# Patient Record
Sex: FEMALE | Race: WHITE | NOT HISPANIC OR LATINO | Employment: UNEMPLOYED | URBAN - NONMETROPOLITAN AREA
[De-identification: names, ages, dates, MRNs, and addresses within clinical notes are randomized per-mention and may not be internally consistent; named-entity substitution may affect disease eponyms.]

---

## 2017-07-24 ENCOUNTER — HOSPITAL ENCOUNTER (EMERGENCY)
Facility: HOSPITAL | Age: 22
Discharge: HOME | End: 2017-07-24
Attending: STUDENT IN AN ORGANIZED HEALTH CARE EDUCATION/TRAINING PROGRAM | Admitting: STUDENT IN AN ORGANIZED HEALTH CARE EDUCATION/TRAINING PROGRAM

## 2017-07-24 ENCOUNTER — APPOINTMENT (OUTPATIENT)
Dept: GENERAL RADIOLOGY | Facility: HOSPITAL | Age: 22
End: 2017-07-24

## 2017-07-24 VITALS
HEIGHT: 67 IN | BODY MASS INDEX: 39.24 KG/M2 | TEMPERATURE: 98.3 F | DIASTOLIC BLOOD PRESSURE: 67 MMHG | RESPIRATION RATE: 18 BRPM | WEIGHT: 250 LBS | SYSTOLIC BLOOD PRESSURE: 108 MMHG | OXYGEN SATURATION: 99 % | HEART RATE: 66 BPM

## 2017-07-24 DIAGNOSIS — S90.32XA CONTUSION OF LEFT FOOT, INITIAL ENCOUNTER: Primary | ICD-10-CM

## 2017-07-24 PROCEDURE — 99283 EMERGENCY DEPT VISIT LOW MDM: CPT

## 2017-07-24 PROCEDURE — 73630 X-RAY EXAM OF FOOT: CPT

## 2017-07-24 RX ORDER — MELOXICAM 15 MG/1
15 TABLET ORAL DAILY
Qty: 20 TABLET | Refills: 0 | Status: SHIPPED | OUTPATIENT
Start: 2017-07-24 | End: 2018-03-09

## 2017-07-25 NOTE — ED PROVIDER NOTES
Subjective   HPI Comments: 22-year-old female who works at [employer] just prior to arrival had a pallet of dog food land on her left foot.  He states she's had severe pain since that time that is constant and worse with ambulation.      Review of Systems   All other systems reviewed and are negative.      History reviewed. No pertinent past medical history.    No Known Allergies    Past Surgical History:   Procedure Laterality Date   • ANKLE SURGERY Left        History reviewed. No pertinent family history.    Social History     Social History   • Marital status: Single     Spouse name: N/A   • Number of children: N/A   • Years of education: N/A     Social History Main Topics   • Smoking status: Never Smoker   • Smokeless tobacco: Current User     Types: Chew   • Alcohol use No   • Drug use: No   • Sexual activity: Not Asked     Other Topics Concern   • None     Social History Narrative   • None           Objective   Physical Exam   Nursing note and vitals reviewed.    GEN: No acute distress  Head: Normocephalic, atraumatic  Eyes: Pupils equal round reactive to light  ENT: Posterior pharynx normal in appearance, oral mucosa is moist  Chest: Nontender to palpation  Cardiovascular: Regular rate  Lungs: Clear to auscultation bilaterally  Abdomen: Soft, nontender, nondistended, no peritoneal signs  Extremities: Dorsum of the distal to mid left foot is tender to palpation.  It is normal in appearance.  Strong DP pulse.  No ecchymosis swelling or deformity.    Neuro: GCS 15  Psych: Mood and affect are appropriate    Procedures         ED Course  ED Course                  MDM  Number of Diagnoses or Management Options  Contusion of left foot, initial encounter:      Amount and/or Complexity of Data Reviewed  Independent visualization of images, tracings, or specimens: yes        Final diagnoses:   Contusion of left foot, initial encounter            Theodore Brandt MD  07/24/17 4821

## 2018-07-04 ENCOUNTER — HOSPITAL ENCOUNTER (EMERGENCY)
Facility: HOSPITAL | Age: 23
Discharge: HOME | End: 2018-07-04
Attending: EMERGENCY MEDICINE | Admitting: NURSE PRACTITIONER

## 2018-07-04 VITALS
HEIGHT: 67 IN | TEMPERATURE: 97.9 F | DIASTOLIC BLOOD PRESSURE: 85 MMHG | BODY MASS INDEX: 44.36 KG/M2 | WEIGHT: 282.6 LBS | RESPIRATION RATE: 18 BRPM | OXYGEN SATURATION: 98 % | HEART RATE: 88 BPM | SYSTOLIC BLOOD PRESSURE: 142 MMHG

## 2018-07-04 DIAGNOSIS — J01.90 SUBACUTE SINUSITIS, UNSPECIFIED LOCATION: Primary | ICD-10-CM

## 2018-07-04 DIAGNOSIS — R11.2 NON-INTRACTABLE VOMITING WITH NAUSEA, UNSPECIFIED VOMITING TYPE: ICD-10-CM

## 2018-07-04 PROCEDURE — 99283 EMERGENCY DEPT VISIT LOW MDM: CPT

## 2018-07-04 RX ORDER — CEPHALEXIN 500 MG/1
500 CAPSULE ORAL 3 TIMES DAILY
Qty: 30 CAPSULE | Refills: 0 | OUTPATIENT
Start: 2018-07-04 | End: 2019-07-24

## 2018-07-04 RX ORDER — ONDANSETRON 4 MG/1
4 TABLET, ORALLY DISINTEGRATING ORAL ONCE
Status: COMPLETED | OUTPATIENT
Start: 2018-07-04 | End: 2018-07-04

## 2018-07-04 RX ORDER — ONDANSETRON 4 MG/1
4 TABLET, ORALLY DISINTEGRATING ORAL EVERY 6 HOURS PRN
Qty: 20 TABLET | Refills: 0 | Status: SHIPPED | OUTPATIENT
Start: 2018-07-04 | End: 2019-08-04

## 2018-07-04 RX ORDER — CEPHALEXIN 250 MG/1
500 CAPSULE ORAL ONCE
Status: COMPLETED | OUTPATIENT
Start: 2018-07-04 | End: 2018-07-04

## 2018-07-04 RX ADMIN — CEPHALEXIN 500 MG: 250 CAPSULE ORAL at 13:20

## 2018-07-04 RX ADMIN — ONDANSETRON 4 MG: 4 TABLET, ORALLY DISINTEGRATING ORAL at 13:20

## 2018-07-04 NOTE — ED PROVIDER NOTES
Subjective   History of Present Illness  23-year-old female presents with complaints of sinus congestion, sore throat, postnasal drainage, cough, nausea and vomiting mainly in the morning and evening.  He doesn't know if she's had a fever.  She's been having symptoms for approximately 3 weeks and was treated with amoxicillin and took 2 doses but it caused her to have diarrhea so she stopped it.  Review of Systems   All other systems reviewed and are negative.      Past Medical History:   Diagnosis Date   • Asthma        No Known Allergies    Past Surgical History:   Procedure Laterality Date   • ANKLE SURGERY Left        History reviewed. No pertinent family history.    Social History     Social History   • Marital status: Single     Social History Main Topics   • Smoking status: Never Smoker   • Smokeless tobacco: Current User     Types: Chew   • Alcohol use No   • Drug use: No     Other Topics Concern   • Not on file           Objective   Physical Exam   Constitutional: She is oriented to person, place, and time. She appears well-developed and well-nourished.   HENT:   Head: Normocephalic and atraumatic.   TMs are pearly gray bilaterally.  Nares are red with yellow drainage bilaterally.  She has maxillary and frontal sinus tenderness to palpation bilaterally.  Posterior pharynx is slightly red with yellow postnasal drainage.   Eyes: Conjunctivae and EOM are normal. Pupils are equal, round, and reactive to light.   Neck: Normal range of motion. Neck supple.   Cardiovascular: Normal rate, regular rhythm, normal heart sounds and intact distal pulses.  Exam reveals no gallop and no friction rub.    No murmur heard.  Pulmonary/Chest: Effort normal and breath sounds normal. No respiratory distress. She has no wheezes. She has no rales. She exhibits no tenderness.   Musculoskeletal: Normal range of motion.   Lymphadenopathy:     She has no cervical adenopathy.   Neurological: She is alert and oriented to person, place,  and time.   Skin: Skin is warm and dry. Capillary refill takes less than 2 seconds.   Psychiatric: She has a normal mood and affect. Her behavior is normal. Judgment and thought content normal.   Nursing note and vitals reviewed.      Procedures           ED Course        Gave the patient a dose of Keflex and Zofran here in the emergency department.  She was able to keep that down.  I told her she had complete the course of antibiotics to get rid of the sinusitis.  I also ordered Zofran for nausea.  I told her she can get some yogurt or probiotics to help with diarrhea.  I gave her a note for work today.  I recommended she drink plenty of fluids.          MDM      Final diagnoses:   Subacute sinusitis, unspecified location   Non-intractable vomiting with nausea, unspecified vomiting type            Shraddha Tellez, APRN  07/04/18 1837

## 2018-10-15 ENCOUNTER — TRANSCRIBE ORDERS (OUTPATIENT)
Dept: ADMINISTRATIVE | Facility: HOSPITAL | Age: 23
End: 2018-10-15

## 2018-10-15 ENCOUNTER — HOSPITAL ENCOUNTER (OUTPATIENT)
Dept: GENERAL RADIOLOGY | Facility: HOSPITAL | Age: 23
Discharge: HOME | End: 2018-10-15
Admitting: NURSE PRACTITIONER

## 2018-10-15 DIAGNOSIS — R05.9 COUGH: Primary | ICD-10-CM

## 2018-10-15 DIAGNOSIS — R09.89 CHEST CONGESTION: ICD-10-CM

## 2018-10-15 PROCEDURE — 71046 X-RAY EXAM CHEST 2 VIEWS: CPT

## 2019-08-09 ENCOUNTER — TELEPHONE (OUTPATIENT)
Dept: URGENT CARE | Facility: CLINIC | Age: 24
End: 2019-08-09

## 2019-08-09 DIAGNOSIS — J45.31 MILD PERSISTENT ASTHMATIC BRONCHITIS WITH ACUTE EXACERBATION: ICD-10-CM

## 2019-08-09 RX ORDER — IPRATROPIUM BROMIDE AND ALBUTEROL SULFATE 2.5; .5 MG/3ML; MG/3ML
SOLUTION RESPIRATORY (INHALATION)
Qty: 360 ML | Refills: 0 | Status: SHIPPED | OUTPATIENT
Start: 2019-08-09

## 2019-08-16 ENCOUNTER — TELEPHONE (OUTPATIENT)
Dept: URGENT CARE | Facility: CLINIC | Age: 24
End: 2019-08-16

## 2019-08-16 DIAGNOSIS — J45.31 MILD PERSISTENT ASTHMATIC BRONCHITIS WITH ACUTE EXACERBATION: ICD-10-CM

## 2019-08-16 RX ORDER — ALBUTEROL SULFATE 90 UG/1
2 AEROSOL, METERED RESPIRATORY (INHALATION) EVERY 4 HOURS PRN
Qty: 1 INHALER | Refills: 0 | Status: SHIPPED | OUTPATIENT
Start: 2019-08-16

## 2019-10-16 DIAGNOSIS — J45.31 MILD PERSISTENT ASTHMATIC BRONCHITIS WITH ACUTE EXACERBATION: ICD-10-CM

## 2019-10-16 RX ORDER — ALBUTEROL SULFATE 90 UG/1
AEROSOL, METERED RESPIRATORY (INHALATION)
Qty: 18 INHALER | Refills: 0 | OUTPATIENT
Start: 2019-10-16

## 2019-11-17 ENCOUNTER — TELEPHONE (OUTPATIENT)
Dept: URGENT CARE | Facility: CLINIC | Age: 24
End: 2019-11-17

## 2019-11-17 DIAGNOSIS — J45.31 MILD PERSISTENT ASTHMATIC BRONCHITIS WITH ACUTE EXACERBATION: ICD-10-CM

## 2019-11-17 DIAGNOSIS — J30.9 ALLERGIC RHINITIS, UNSPECIFIED SEASONALITY, UNSPECIFIED TRIGGER: ICD-10-CM

## 2019-11-17 RX ORDER — MONTELUKAST SODIUM 10 MG/1
10 TABLET ORAL NIGHTLY
Qty: 30 TABLET | Refills: 0 | Status: SHIPPED | OUTPATIENT
Start: 2019-11-17

## 2019-11-18 ENCOUNTER — TRANSCRIBE ORDERS (OUTPATIENT)
Dept: GENERAL RADIOLOGY | Facility: HOSPITAL | Age: 24
End: 2019-11-18

## 2019-11-18 ENCOUNTER — HOSPITAL ENCOUNTER (OUTPATIENT)
Dept: GENERAL RADIOLOGY | Facility: HOSPITAL | Age: 24
Discharge: HOME | End: 2019-11-18
Admitting: NURSE PRACTITIONER

## 2019-11-18 DIAGNOSIS — M25.562 LEFT KNEE PAIN, UNSPECIFIED CHRONICITY: ICD-10-CM

## 2019-11-18 DIAGNOSIS — M25.562 LEFT KNEE PAIN, UNSPECIFIED CHRONICITY: Primary | ICD-10-CM

## 2019-11-18 PROCEDURE — 73562 X-RAY EXAM OF KNEE 3: CPT

## 2019-12-10 ENCOUNTER — OFFICE VISIT (OUTPATIENT)
Dept: ORTHOPEDIC SURGERY | Facility: CLINIC | Age: 24
End: 2019-12-10

## 2019-12-10 VITALS — WEIGHT: 250 LBS | RESPIRATION RATE: 18 BRPM | BODY MASS INDEX: 39.24 KG/M2 | HEIGHT: 67 IN

## 2019-12-10 DIAGNOSIS — S86.912A STRAIN OF LEFT KNEE, INITIAL ENCOUNTER: ICD-10-CM

## 2019-12-10 DIAGNOSIS — M25.562 ARTHRALGIA OF LEFT KNEE: Primary | ICD-10-CM

## 2019-12-10 PROCEDURE — 99203 OFFICE O/P NEW LOW 30 MIN: CPT | Performed by: ORTHOPAEDIC SURGERY
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
## 2019-12-26 ENCOUNTER — HOSPITAL ENCOUNTER (OUTPATIENT)
Dept: MRI IMAGING | Facility: HOSPITAL | Age: 24
Discharge: HOME | End: 2019-12-26
Admitting: ORTHOPAEDIC SURGERY

## 2019-12-26 PROCEDURE — 73721 MRI JNT OF LWR EXTRE W/O DYE: CPT

## 2019-12-29 DIAGNOSIS — J45.31 MILD PERSISTENT ASTHMATIC BRONCHITIS WITH ACUTE EXACERBATION: ICD-10-CM

## 2019-12-30 RX ORDER — ALBUTEROL SULFATE 90 UG/1
AEROSOL, METERED RESPIRATORY (INHALATION)
Qty: 18 INHALER | Refills: 0 | OUTPATIENT
Start: 2019-12-30

## 2020-01-02 ENCOUNTER — OFFICE VISIT (OUTPATIENT)
Dept: ORTHOPEDIC SURGERY | Facility: CLINIC | Age: 25
End: 2020-01-02

## 2020-01-02 VITALS — HEIGHT: 67 IN | BODY MASS INDEX: 39.24 KG/M2 | RESPIRATION RATE: 18 BRPM | WEIGHT: 250 LBS

## 2020-01-02 DIAGNOSIS — M94.262 CHONDROMALACIA OF LEFT KNEE: Primary | ICD-10-CM

## 2020-01-02 PROCEDURE — 99213 OFFICE O/P EST LOW 20 MIN: CPT | Performed by: ORTHOPAEDIC SURGERY

## 2020-01-02 NOTE — PROGRESS NOTES
Subjective   Patient ID: Paula Linares is a 24 y.o. female  Follow-up and Pain of the Left Knee (Patient is here today for her MRI results.)             History of Present Illness  Recent left anterior knee pain is much improved with her own topical modalities taking periodic anti-inflammatories, recent MR showed early signal change posterior horn medial meniscus but no discrete tear minimal chondromalacic findings.  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   HENT: Negative for dental problem and voice change.    Eyes: Negative for visual disturbance.   Respiratory: Negative for shortness of breath.    Cardiovascular: Negative for chest pain.   Gastrointestinal: Negative for abdominal pain.   Genitourinary: Negative for dysuria.   Musculoskeletal: Positive for arthralgias. Negative for gait problem and joint swelling.   Skin: Negative for rash.   Neurological: Negative for speech difficulty.   Hematological: Does not bruise/bleed easily.   Psychiatric/Behavioral: Negative for confusion.       Past Medical History:   Diagnosis Date   • Asthma    • Environmental and seasonal allergies         Past Surgical History:   Procedure Laterality Date   • ANKLE SURGERY Left        History reviewed. No pertinent family history.    Social History     Socioeconomic History   • Marital status: Single     Spouse name: Not on file   • Number of children: Not on file   • Years of education: Not on file   • Highest education level: Not on file   Tobacco Use   • Smoking status: Never Smoker   • Smokeless tobacco: Current User     Types: Chew   Substance and Sexual Activity   • Alcohol use: No   • Drug use: No   • Sexual activity: Defer       I have reviewed the above medical and surgical history, family history, social history, medications, allergies and review of systems.    No Known  "Allergies      Current Outpatient Medications:   •  albuterol sulfate  (90 Base) MCG/ACT inhaler, Inhale 2 puffs Every 4 (Four) Hours As Needed for Wheezing., Disp: 1 inhaler, Rfl: 0  •  benzonatate (TESSALON) 200 MG capsule, Take 1 capsule by mouth 3 (Three) Times a Day As Needed for Cough., Disp: 21 capsule, Rfl: 0  •  BREO ELLIPTA 200-25 MCG/INH inhaler, INHALE ONE PUFF DAILY, Disp: , Rfl: 6  •  doxycycline (MONODOX) 100 MG capsule, 1 po bid, Disp: 20 capsule, Rfl: 0  •  fexofenadine-pseudoephedrine (ALLEGRA-D 24) 180-240 MG per 24 hr tablet, Take 1 tablet by mouth Daily., Disp: , Rfl:   •  fluticasone (FLONASE) 50 MCG/ACT nasal spray, ONE SPRAY IN EACH NOSTRIL DAILY, Disp: , Rfl: 2  •  ipratropium-albuterol (DUO-NEB) 0.5-2.5 mg/3 ml nebulizer, Nebulize q 4 h prn wheezing / shortness of breath, Disp: 360 mL, Rfl: 0  •  methylPREDNISolone (MEDROL, LEANDRA,) 4 MG tablet, Take as directed on package instructions., Disp: 1 each, Rfl: 0  •  montelukast (SINGULAIR) 10 MG tablet, Take 1 tablet by mouth Every Night., Disp: 30 tablet, Rfl: 0  •  omeprazole (priLOSEC) 20 MG capsule, Take 20 mg by mouth Daily., Disp: , Rfl:     Objective   Resp 18   Ht 170.2 cm (67\")   Wt 113 kg (250 lb)   BMI 39.16 kg/m²    Physical Exam  Constitutional: Patient is oriented to person, place, and time. Patient appears well-developed and well-nourished.   HENT:Head: Normocephalic and atraumatic.   Eyes: EOM are normal. Pupils are equal, round, and reactive to light.   Neck: Normal range of motion. Neck supple.   Cardiovascular: Normal rate.    Pulmonary/Chest: Effort normal and breath sounds normal.   Abdominal: Soft.   Neurological: Patient is alert and oriented to person, place, and time.   Skin: Skin is warm and dry.   Psychiatric: Patient has a normal mood and affect.   Nursing note and vitals reviewed.       [unfilled]   Left knee: No effusion full range of motion minimal patellofemoral crepitus Sujata sign negative calf supple " neurovascularly intact.    Assessment/Plan   Review of Radiographic Studies:    No new imaging done today.  MR left knee images directly examined with the patient demonstrating very subtle posterior horn signal changes no discrete tear identified in the meniscus.      Procedures     Paula was seen today for follow-up and pain.    Diagnoses and all orders for this visit:    Chondromalacia of left knee       Physical therapy referral given      Recommendations/Plan:   Work/Activity Status: May perform usual activities as tolerated    Patient agreeable to call or return sooner for any concerns.             Impression:  Improve left anterior knee pain chondromalacia no discrete meniscal tear on MR  Plan:  Therapy referral home exercise appropriate icing use of OTC NSAIDs return PRN

## 2020-03-29 ENCOUNTER — APPOINTMENT (OUTPATIENT)
Dept: CT IMAGING | Facility: HOSPITAL | Age: 25
End: 2020-03-29

## 2020-03-29 ENCOUNTER — HOSPITAL ENCOUNTER (EMERGENCY)
Facility: HOSPITAL | Age: 25
Discharge: HOME | End: 2020-03-29
Attending: EMERGENCY MEDICINE | Admitting: EMERGENCY MEDICINE

## 2020-03-29 VITALS
HEIGHT: 66 IN | SYSTOLIC BLOOD PRESSURE: 145 MMHG | RESPIRATION RATE: 16 BRPM | DIASTOLIC BLOOD PRESSURE: 93 MMHG | OXYGEN SATURATION: 96 % | BODY MASS INDEX: 38.41 KG/M2 | TEMPERATURE: 97.5 F | HEART RATE: 97 BPM | WEIGHT: 239 LBS
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
## 2020-03-29 DIAGNOSIS — S09.90XA INJURY OF HEAD, INITIAL ENCOUNTER: Primary | ICD-10-CM

## 2020-03-29 PROCEDURE — 99283 EMERGENCY DEPT VISIT LOW MDM: CPT

## 2020-03-29 PROCEDURE — 70450 CT HEAD/BRAIN W/O DYE: CPT

## 2021-05-29 ENCOUNTER — HOSPITAL ENCOUNTER (EMERGENCY)
Facility: HOSPITAL | Age: 26
Discharge: HOME | End: 2021-05-29
Attending: EMERGENCY MEDICINE | Admitting: EMERGENCY MEDICINE

## 2021-05-29 ENCOUNTER — APPOINTMENT (OUTPATIENT)
Dept: GENERAL RADIOLOGY | Facility: HOSPITAL | Age: 26
End: 2021-05-29

## 2021-05-29 VITALS
OXYGEN SATURATION: 98 % | DIASTOLIC BLOOD PRESSURE: 94 MMHG | SYSTOLIC BLOOD PRESSURE: 158 MMHG | HEIGHT: 66 IN | WEIGHT: 270 LBS | BODY MASS INDEX: 43.39 KG/M2 | RESPIRATION RATE: 18 BRPM | TEMPERATURE: 98 F | HEART RATE: 89 BPM

## 2021-05-29 DIAGNOSIS — S82.831A CLOSED FRACTURE OF DISTAL END OF RIGHT FIBULA, UNSPECIFIED FRACTURE MORPHOLOGY, INITIAL ENCOUNTER: Primary | ICD-10-CM

## 2021-05-29 PROCEDURE — 99283 EMERGENCY DEPT VISIT LOW MDM: CPT

## 2021-05-29 PROCEDURE — 73610 X-RAY EXAM OF ANKLE: CPT

## 2021-05-29 RX ORDER — IBUPROFEN 800 MG/1
800 TABLET ORAL ONCE
Status: COMPLETED | OUTPATIENT
Start: 2021-05-29 | End: 2021-05-29

## 2021-05-29 RX ORDER — HYDROCODONE BITARTRATE AND ACETAMINOPHEN 5; 325 MG/1; MG/1
1 TABLET ORAL EVERY 6 HOURS PRN
Qty: 10 TABLET | Refills: 0 | Status: SHIPPED | OUTPATIENT
Start: 2021-05-29

## 2021-05-29 RX ORDER — LORAZEPAM 2 MG/ML
INJECTION INTRAMUSCULAR
Status: DISCONTINUED
Start: 2021-05-29 | End: 2021-05-29 | Stop reason: HOSPADM

## 2021-05-29 RX ORDER — HYDROCODONE BITARTRATE AND ACETAMINOPHEN 5; 325 MG/1; MG/1
1 TABLET ORAL ONCE
Status: COMPLETED | OUTPATIENT
Start: 2021-05-29 | End: 2021-05-29

## 2021-05-29 RX ADMIN — IBUPROFEN 800 MG: 800 TABLET, FILM COATED ORAL at 10:28

## 2021-05-29 RX ADMIN — HYDROCODONE BITARTRATE AND ACETAMINOPHEN 1 TABLET: 5; 325 TABLET ORAL at 10:33

## 2021-09-24 ENCOUNTER — TRANSCRIBE ORDERS (OUTPATIENT)
Dept: ADMINISTRATIVE | Facility: HOSPITAL | Age: 26
End: 2021-09-24

## 2021-09-24 DIAGNOSIS — S82.61XD CLOSED DISPLACED FRACTURE OF LATERAL MALLEOLUS OF RIGHT FIBULA WITH ROUTINE HEALING: Primary | ICD-10-CM

## 2021-09-28 ENCOUNTER — HOSPITAL ENCOUNTER (OUTPATIENT)
Dept: CT IMAGING | Facility: HOSPITAL | Age: 26
Discharge: HOME | End: 2021-09-28
Admitting: ORTHOPAEDIC SURGERY

## 2021-09-28 DIAGNOSIS — S82.61XD CLOSED DISPLACED FRACTURE OF LATERAL MALLEOLUS OF RIGHT FIBULA WITH ROUTINE HEALING: ICD-10-CM

## 2021-09-28 PROCEDURE — 73700 CT LOWER EXTREMITY W/O DYE: CPT

## 2023-11-03 ENCOUNTER — OFFICE VISIT (OUTPATIENT)
Dept: PSYCHIATRY | Facility: CLINIC | Age: 28
End: 2023-11-03
Payer: COMMERCIAL

## 2023-11-03 DIAGNOSIS — F33.1 MODERATE EPISODE OF RECURRENT MAJOR DEPRESSIVE DISORDER: Primary | ICD-10-CM

## 2023-11-03 PROCEDURE — 90791 PSYCH DIAGNOSTIC EVALUATION: CPT | Performed by: COUNSELOR
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
## 2023-11-13 ENCOUNTER — OFFICE VISIT (OUTPATIENT)
Dept: PSYCHIATRY | Facility: CLINIC | Age: 28
End: 2023-11-13
Payer: COMMERCIAL

## 2023-11-13 DIAGNOSIS — F33.1 MODERATE EPISODE OF RECURRENT MAJOR DEPRESSIVE DISORDER: Primary | ICD-10-CM

## 2023-11-13 DIAGNOSIS — F43.29 GRIEF REACTION WITH PROLONGED BEREAVEMENT: ICD-10-CM

## 2023-11-13 DIAGNOSIS — F41.1 GENERALIZED ANXIETY DISORDER: ICD-10-CM

## 2023-11-13 NOTE — PROGRESS NOTES
Date:2023   Patient Name: Paula Linares  : 1995   MRN: [id number]   Time IN: 4:26 PM    Time OUT: 5:34 PM     Referring Provider: Lexy Gonzales APRN    PROGRESS NOTE    History of Present Illness:   Paula Linares is a 28 y.o. female who is being seen today for follow up individual Psychotherapy session.     Chief Complaint:  Pt reports she lost her mother 2022 due to overdose.  Pt reports she has been depressed, irritable and had been drinking excessively (several times a week).  Pt reports she has not drank in five weeks.  Pt reports she physically feels better however her MH has not improved.  Pt reports she is not currently on medication for her depression however has been prescribed something she never started out of fear.  Pt reports to help work through grief they had a  service and [facility].  Pt reports the last time she saw her mother was on good terms.  Pt reports relational stressors with oldest sister.  Pt reports her sister gets upset easily and will cut her off for extended periods of time.  Pt reports they have had real abandonment due to mothers substance abuse issues growing up.  Pt helps care for her grandmother at [facility] as well (recovering from knee surgery); with cooking other tasks.  Pt has 10 and 12 year olds with wife.  Pt indicated anxiety as well on MALIK 7=10.  Pt reports hx of childhood trauma.                    ICD-10-CM ICD-9-CM   1. Moderate episode of recurrent major depressive disorder  F33.1 296.32   2. Grief reaction with prolonged bereavement  F43.29 309.0   3. Generalized anxiety disorder  F41.1 300.02      Clinical Maneuvering/Intervention:   Assisted patient in processing above session content; acknowledged and normalized patient’s thoughts, feelings, and concerns to build appropriate rapport and a positive therapeutic relationship with open and honest communication.  Processed and rationalized patients thoughts  and feelings regarding current stressors, grief.  Discussed triggers associated with patient's anxiety/depression/grief.  Also discussed coping skills for patient to implement such as healthy boundaries with others to protect emotional/physical wellbeing, assertively communicating needs/feelings with others, priority.  Pt reports talking to her GM, going for a walk, time with partner help relieve stress.  Therapist recommended pt identify positive means for relaxation and relieving stress.      Allowed patient to freely discuss issues without interruption or judgment. Provided safe, confidential environment to facilitate the development of positive therapeutic relationship and encourage open, honest communication. Assisted patient in identifying risk factors which would indicate the need for higher level of care including thoughts to harm self or others and/or self-harming behavior and encouraged patient to contact this office, call 911, or present to the nearest emergency room should any of these events occur. Discussed crisis intervention services and means to access. Patient adamantly and convincingly denies current suicidal or homicidal ideation or perceptual disturbance.    Mental Status Exam:   Hygiene:   good  Cooperation:  Cooperative  Eye Contact:  Good  Psychomotor Behavior:  Appropriate  Affect:  Appropriate  Mood:  stressed  Speech:  Normal  Thought Process:  Goal directed and Linear  Thought Content:  Mood congruent  Suicidal:  None  Homicidal:  None  Hallucinations:  None  Delusion:  None  Memory:  Intact  Orientation:  Person, Place, Time, and Situation  Reliability:  good  Insight:  Good  Judgement:  Good  Impulse Control:  Good  Physical/Medical Issues:  No      Patient's Support Network Includes:  wife and extended family    Functional Status: Moderate impairment     Progress toward goal: Not at goal    Prognosis: Good with Ongoing Treatment     Medications:     Current Outpatient Medications:      albuterol sulfate  (90 Base) MCG/ACT inhaler, Two puffs per day every 4-6 hours as needed for wheeze or shortness of breath., Disp: 18 g, Rfl: 0    amoxicillin-clavulanate (AUGMENTIN) 875-125 MG per tablet, Take 1 tablet by mouth Every 12 (Twelve) Hours., Disp: 14 tablet, Rfl: 0    montelukast (SINGULAIR) 10 MG tablet, Take 1 tablet by mouth Every Night., Disp: 30 tablet, Rfl: 0    predniSONE (DELTASONE) 10 MG (21) dose pack, Daily taper 6,5,4,3,2,1, Disp: 21 each, Rfl: 0    promethazine-dextromethorphan (PROMETHAZINE-DM) 6.25-15 MG/5ML syrup, Take 5 mL by mouth 4 (Four) Times a Day As Needed for Cough., Disp: 118 mL, Rfl: 0    Visit Diagnosis/Orders Placed This Visit:    ICD-10-CM ICD-9-CM   1. Moderate episode of recurrent major depressive disorder  F33.1 296.32   2. Grief reaction with prolonged bereavement  F43.29 309.0   3. Generalized anxiety disorder  F41.1 300.02        PLAN:  Safety: No acute safety concerns  Risk Assessment: Risk of self-harm acutely is low. Risk of self-harm chronically is also low, but could be further elevated in the event of treatment noncompliance and/or AODA.    Crisis Plan:  Symptoms and/or behaviors to indicate a crisis: Feeling sad or low and Prolonged irritability or anger    What calming techniques or other strategies will patient use to de-escalate and stay safe: slow down, breathe, visualize calming self, think it though, listen to music, change focus, take a walk    Who is one person patient can contact to assist with de-escalation? wife    Treatment Plan/Goals: Patient will continue supportive psychotherapy efforts and medication regimen as prescribed. Therapist will provide Cognitive Behavioral Therapy to assist patient in improving functioning and gaining coping skills, maintaining stability, and avoiding decompensation and the need for higher level of care. Plan for treatment was discussed during today's visit. Patient acknowledged and verbally consented to  continue with current treatment plan and was educated on the importance of compliance with treatment and follow-up appointments.     Patient will contact this office, call 911 or present to the nearest emergency room should suicidal or homicidal ideations occur.     Follow Up:   Return in about 3 weeks (around 12/4/2023) for Therapy session.      BARRY Farley   [facility] [place]     This document has been electronically signed by BARRY Farley

## 2023-12-11 ENCOUNTER — TELEPHONE (OUTPATIENT)
Dept: PSYCHIATRY | Facility: CLINIC | Age: 28
End: 2023-12-11
Payer: COMMERCIAL

## 2024-01-22 ENCOUNTER — TELEMEDICINE (OUTPATIENT)
Dept: PSYCHIATRY | Facility: CLINIC | Age: 29
End: 2024-01-22
Payer: COMMERCIAL

## 2024-01-22 DIAGNOSIS — F41.1 GENERALIZED ANXIETY DISORDER: ICD-10-CM

## 2024-01-22 DIAGNOSIS — F33.1 MODERATE EPISODE OF RECURRENT MAJOR DEPRESSIVE DISORDER: Primary | ICD-10-CM

## 2024-01-22 PROCEDURE — 90837 PSYTX W PT 60 MINUTES: CPT | Performed by: COUNSELOR

## 2024-01-22 PROCEDURE — 1160F RVW MEDS BY RX/DR IN RCRD: CPT | Performed by: COUNSELOR

## 2024-01-22 PROCEDURE — 1159F MED LIST DOCD IN RCRD: CPT | Performed by: COUNSELOR
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
## 2024-01-25 ENCOUNTER — OFFICE VISIT (OUTPATIENT)
Dept: PSYCHIATRY | Facility: CLINIC | Age: 29
End: 2024-01-25
Payer: COMMERCIAL

## 2024-01-25 VITALS
DIASTOLIC BLOOD PRESSURE: 72 MMHG | WEIGHT: 289 LBS | OXYGEN SATURATION: 98 % | BODY MASS INDEX: 46.45 KG/M2 | HEART RATE: 94 BPM | HEIGHT: 66 IN | SYSTOLIC BLOOD PRESSURE: 112 MMHG

## 2024-01-25 DIAGNOSIS — F41.1 GAD (GENERALIZED ANXIETY DISORDER): ICD-10-CM

## 2024-01-25 DIAGNOSIS — F33.1 MODERATE EPISODE OF RECURRENT MAJOR DEPRESSIVE DISORDER: ICD-10-CM

## 2024-01-25 DIAGNOSIS — F10.20 ALCOHOL USE DISORDER, SEVERE, DEPENDENCE: Primary | ICD-10-CM

## 2024-01-25 DIAGNOSIS — F12.90 CANNABIS USE WITHOUT COMPLICATION: ICD-10-CM

## 2024-01-25 DIAGNOSIS — F17.290 OTHER TOBACCO PRODUCT NICOTINE DEPENDENCE, UNCOMPLICATED: ICD-10-CM

## 2024-01-25 RX ORDER — FLUOXETINE 10 MG/1
10 CAPSULE ORAL DAILY
Qty: 30 CAPSULE | Refills: 0 | Status: SHIPPED | OUTPATIENT
Start: 2024-01-25

## 2024-01-25 RX ORDER — NICOTINE 21 MG/24HR
1 PATCH, TRANSDERMAL 24 HOURS TRANSDERMAL EVERY 24 HOURS
Qty: 30 PATCH | Refills: 0 | Status: SHIPPED | OUTPATIENT
Start: 2024-01-25

## 2024-01-25 RX ORDER — NALOXONE HYDROCHLORIDE 4 MG/.1ML
1 SPRAY NASAL AS NEEDED
Qty: 1 EACH | Refills: 2 | Status: SHIPPED | OUTPATIENT
Start: 2024-01-25

## 2024-01-25 RX ORDER — DESVENLAFAXINE 25 MG/1
1 TABLET, EXTENDED RELEASE ORAL DAILY
COMMUNITY
Start: 2023-10-18 | End: 2024-01-25

## 2024-01-25 RX ORDER — LORATADINE 10 MG/1
1 TABLET ORAL DAILY
COMMUNITY
Start: 2023-10-18

## 2024-01-25 NOTE — PROGRESS NOTES
"     New Patient Office Visit        Patient Name: Paula Linares  : 1995   MRN: [id number]     Referring Provider: Lexy Gonzales APRN    Chief Complaint: Substance use    History of Present Illness:   Paula Linares is a 28 y.o. female who is here today for court ordered initial evaluation with provider related to substance use. Initial substance at age 12 with marijuana and used socially at onset.  Use became more regular by age 20.  Currently smoking 2 or 3 hits of a blunt nightly for sleep and anxiety.  Last intake yesterday.  Does not feel use is problematic.  Alcohol use started at age 16.  Was social at onset but use increased to over time.  Was drinking daily by age 20 and at peak was drinking a 12 pack of beer daily.  Use really started to escalate in 2022 after the death of her mother by overdose.  Was arrested on 2023 for fourth-degree assault while intoxicated.  Has not had any alcohol intake since.  Denies any cravings for alcohol or marijuana.  Use is typically triggered by habit, need to relax.  Denies any SHANNON treatment or MAT in the past.  Identifies sober support system of her wife, grandmother, and sisters. Motivated to change as \"I do not want to live the life my mom did”.     Has psych history of MDD and MALIK. Today reports symptoms of easily annoyed, constant worry, depressed mood, lack of motivation/interest, daily fatigue, and feelings of guilt at times. Worsening over the last couple years.  Struggles with some unresolved grief.  Previously prescribed Pristiq but never started taking as she was worried about missing doses.  Denies prior psychiatric hospitalizations. Denies SI/HI, AVH. +FH of SHANNON in mother, father, grandfather.     Denies significant past medical history.  Currently has a primary care provider (Christa Gonzales).  Denies hep C/HIV history.  Denies seizure/DT history.  No chance of pregnancy.      Currently lives in [place] with her wife.  Has 2 " children that are in the custody of she and her wife. 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  License is active and has a vehicle. Current legal issues aforementioned fourth-degree assault charge from 11/25/2023.  Charges are out of [facility].  is [facility]. Next court date is pending.    Triggers: Habit, to relax    Cravings: Denies    Relapse Prevention: IOP recommended, individual therapy, psych medication management, recovery meetings    Urine Drug Screen (today's visit) discussed: Ordered    UDS Confirmation: N/A    DHARA (PDMP) Reviewed for Current/Active Medications: No active medications    DSM 5 Alcohol use Disorder Checklist      Diagnostic Criteria   (Substance use disorder requires at least 2 criteria be met within 12 month period)   Meets Criteria          Yes / No  Notes/Supporting Information    Substance often taken in larger amounts or over a longer period than intended.  yes    2.  There is a persistent desire or unsuccessful effort to cut        down or control th substance use.  yes    3.  A great deal of time is spent in activities necessary to        obtain the substance, use the substance, or recover        from its effects.  no    4.  Craving or a strong desire to use the substance.  no    5.  Recurrent substance use resulting in failure to fulfill        major role obligations at work, school, or home.  yes    6.  Continued substance use despite having persistent or         recurrent social or interpersonal problems caused or         exacerbated by the effects of the substance.  yes    7.   Important social, occupational or recreational activities        are given up or reduced because of substance use.  no    8.   Recurrent substance use in situations in which it is        physically hazardous.  no    9.  Continued use despite knowledge of having a         persistent or recurrent physical or psychological         problem that is likely to have  been caused or        exacerbated by the substance.  yes    10. * Tolerance, as defined by either of the following:          a. A need for markedly increased amounts of the              Substance to achieve intoxication or desired effect.          b. Markedly diminished effect with continued use of              The same substance.  yes    11.  * Withdrawal, as manifested by either of the following:         a. The characteristic withdrawal for the substance.          b. The same (or closely related) substance is taken to               Relieve or avoid withdrawal symptoms.  no    **This criterion is not considered to be met for those individuals taking prescriptions opiates solely under medical supervision. **   Severity: Mild: 2-3 symptoms, Moderate: 4-5 symptoms, Severe: 6 or more symptoms.          Past Surgical History:  Past Surgical History:   Procedure Laterality Date    ANKLE SURGERY Left     FRACTURE SURGERY  2010       Problem List:  There is no problem list on file for this patient.      Allergy:   No Known Allergies     Current Medications:   Current Outpatient Medications   Medication Sig Dispense Refill    albuterol sulfate  (90 Base) MCG/ACT inhaler Inhale 2 puffs Every 4 (Four) Hours As Needed for Wheezing. 18 g 0    loratadine (CLARITIN) 10 MG tablet Take 1 tablet by mouth Daily.      mometasone-formoterol (DULERA 100) 100-5 MCG/ACT inhaler Inhale 2 puffs 2 (Two) Times a Day. 1 each 0    FLUoxetine (PROzac) 10 MG capsule Take 1 capsule by mouth Daily. 30 capsule 0    naloxone (NARCAN) 4 MG/0.1ML nasal spray 1 spray into the nostril(s) as directed by provider As Needed for Opioid Reversal or Respiratory Depression. use for oversedation and call 911 1 each 2    nicotine (Nicoderm CQ) 21 MG/24HR patch Place 1 patch on the skin as directed by provider Daily. 30 patch 0     No current facility-administered medications for this visit.       Past Medical History:  Past Medical History:   Diagnosis  Date    Alcohol abuse     Sober 2 months    Alcoholism     Anxiety     Asthma     Depression     Environmental and seasonal allergies        Social History:  Social History     Socioeconomic History    Marital status: Single   Tobacco Use    Smoking status: Never     Passive exposure: Past    Smokeless tobacco: Current     Types: Chew   Vaping Use    Vaping Use: Never used   Substance and Sexual Activity    Alcohol use: Not Currently    Drug use: Not Currently     Types: Other     Comment: Alcohol    Sexual activity: Yes     Partners: Female     Birth control/protection: Same-sex partner       Family History:  Family History   Problem Relation Age of Onset    Drug abuse Mother     Alcohol abuse Father     Alcohol abuse Maternal Grandfather     Anxiety disorder Sister     Depression Sister          Subjective      Review of Systems:   Review of Systems   Constitutional:  Positive for fatigue. Negative for chills and fever.   Respiratory:  Negative for shortness of breath.    Cardiovascular:  Negative for chest pain.   Gastrointestinal:  Negative for abdominal pain.   Skin:  Negative for skin lesions.   Neurological:  Negative for seizures and confusion.   Psychiatric/Behavioral:  Positive for depressed mood and stress. Negative for hallucinations, sleep disturbance and suicidal ideas. The patient is nervous/anxious.        PHQ-9 Depression Screening  Little interest or pleasure in doing things? 1-->several days   Feeling down, depressed, or hopeless? 1-->several days   Trouble falling or staying asleep, or sleeping too much? 1-->several days   Feeling tired or having little energy? 0-->not at all   Poor appetite or overeating? 0-->not at all   Feeling bad about yourself - or that you are a failure or have let yourself or your family down? 1-->several days   Trouble concentrating on things, such as reading the newspaper or watching television? 0-->not at all   Moving or speaking so slowly that other people could have  noticed? Or the opposite - being so fidgety or restless that you have been moving around a lot more than usual? 0-->not at all   Thoughts that you would be better off dead, or of hurting yourself in some way? 0-->not at all   PHQ-9 Total Score 4   If you checked off any problems, how difficult have these problems made it for you to do your work, take care of things at home, or get along with other people? not difficult at all     MALIK-7 Score:   Feeling nervous, anxious or on edge: More than half the days  Not being able to stop or control worrying: Nearly every day  Worrying too much about different things: Nearly every day  Trouble Relaxing: Not at all  Being so restless that it is hard to sit still: Not at all  Feeling afraid as if something awful might happen: Not at all  Becoming easily annoyed or irritable: Nearly every day  MALIK 7 Total Score: 11  If you checked any problems, how difficult have these problems made it for you to do your work, take care of things at home, or get along with other people: Somewhat difficult    Patient History:   The following portions of the patient's history were reviewed and updated as appropriate: allergies, current medications, past family history, past medical history, past social history, past surgical history and problem list.     Social:  Social History     Socioeconomic History    Marital status: Single   Tobacco Use    Smoking status: Never     Passive exposure: Past    Smokeless tobacco: Current     Types: Chew   Vaping Use    Vaping Use: Never used   Substance and Sexual Activity    Alcohol use: Not Currently    Drug use: Not Currently     Types: Other     Comment: Alcohol    Sexual activity: Yes     Partners: Female     Birth control/protection: Same-sex partner       Medications:     Current Outpatient Medications:     albuterol sulfate  (90 Base) MCG/ACT inhaler, Inhale 2 puffs Every 4 (Four) Hours As Needed for Wheezing., Disp: 18 g, Rfl: 0    loratadine  "(CLARITIN) 10 MG tablet, Take 1 tablet by mouth Daily., Disp: , Rfl:     mometasone-formoterol (DULERA 100) 100-5 MCG/ACT inhaler, Inhale 2 puffs 2 (Two) Times a Day., Disp: 1 each, Rfl: 0    FLUoxetine (PROzac) 10 MG capsule, Take 1 capsule by mouth Daily., Disp: 30 capsule, Rfl: 0    naloxone (NARCAN) 4 MG/0.1ML nasal spray, 1 spray into the nostril(s) as directed by provider As Needed for Opioid Reversal or Respiratory Depression. use for oversedation and call 911, Disp: 1 each, Rfl: 2    nicotine (Nicoderm CQ) 21 MG/24HR patch, Place 1 patch on the skin as directed by provider Daily., Disp: 30 patch, Rfl: 0    Objective     Physical Exam  Vitals reviewed.   Constitutional:       General: She is not in acute distress.     Appearance: She is well-developed. She is not ill-appearing.   Pulmonary:      Effort: No respiratory distress.   Neurological:      Mental Status: She is alert and oriented to person, place, and time.      Gait: Gait normal.   Psychiatric:         Attention and Perception: Attention normal.         Mood and Affect: Mood and affect normal.         Speech: Speech normal.         Behavior: Behavior normal. Behavior is cooperative.         Thought Content: Thought content is not paranoid or delusional. Thought content does not include homicidal or suicidal ideation. Thought content does not include homicidal or suicidal plan.         Cognition and Memory: Cognition and memory normal.         Vital Signs:   Vitals:    01/25/24 1424   BP: 112/72   Pulse: 94   SpO2: 98%   Weight: 131 kg (289 lb)   Height: 167.6 cm (66\")     Body mass index is 46.65 kg/m².     Mental Status Exam:   Hygiene:   good  Cooperation:  Cooperative  Eye Contact:  Good  Psychomotor Behavior:  Appropriate  Affect:  Full range  Mood: normal  Speech:  Normal  Thought Process:  Goal directed  Thought Content:  Normal  Suicidal:  None  Homicidal:  None  Hallucinations:  None  Delusion:  None  Memory:  Intact  Orientation:  Person, " Place, Time, and Situation  Reliability:  good  Insight:  Good  Judgement:  Fair  Impulse Control:  Fair    Assessment / Plan      -This is my initial evaluation with Paula.  Based on her SHANNON history provided today IOP has been recommended.  Screener scheduled.  -Also requesting pharmacological intervention for mood.  Has not tried any other medications and is worried about missing doses of Pristiq.  We will start fluoxetine 10 mg daily x 2 weeks then she may increase to 20 mg daily, if needed.  Will call for a refill.  Discussed AEM medication and when to call/RTC.  -Discussed MAT medications for SHANNON.  She feels she is doing well at this time and does not need any intervention for this.  -Start nicotine 21 mg every 24 hour patch as directed.  Do not dip with patch on.  Discussed AEM medication and when to call/RTC.  -Narcan sent to pharmacy and OD education provided.  -MANPREET signed for [facility] and [facility].  -Declines peer support and TCM at this time.  -Continue individual therapy    Assessment & Plan   Problems Addressed this Visit    None  Visit Diagnoses       Alcohol use disorder, severe, dependence    -  Primary    Relevant Medications    naloxone (NARCAN) 4 MG/0.1ML nasal spray    Other Relevant Orders    Urine Drug Screen - Supervised - Urine, Clean Catch    [facility] Tox - Urine, Clean Catch    Cannabis use without complication        Relevant Medications    naloxone (NARCAN) 4 MG/0.1ML nasal spray    MALKI (generalized anxiety disorder)        Relevant Medications    FLUoxetine (PROzac) 10 MG capsule    naloxone (NARCAN) 4 MG/0.1ML nasal spray    nicotine (Nicoderm CQ) 21 MG/24HR patch    Moderate episode of recurrent major depressive disorder        Relevant Medications    FLUoxetine (PROzac) 10 MG capsule    naloxone (NARCAN) 4 MG/0.1ML nasal spray    nicotine (Nicoderm CQ) 21 MG/24HR patch    Other tobacco product nicotine dependence, uncomplicated         Relevant Medications    naloxone (NARCAN) 4 MG/0.1ML nasal spray    nicotine (Nicoderm CQ) 21 MG/24HR patch          Diagnoses         Codes Comments    Alcohol use disorder, severe, dependence    -  Primary ICD-10-CM: F10.20  ICD-9-CM: 303.90     Cannabis use without complication     ICD-10-CM: F12.90  ICD-9-CM: 305.20     MALIK (generalized anxiety disorder)     ICD-10-CM: F41.1  ICD-9-CM: 300.02     Moderate episode of recurrent major depressive disorder     ICD-10-CM: F33.1  ICD-9-CM: 296.32     Other tobacco product nicotine dependence, uncomplicated     ICD-10-CM: F17.290  ICD-9-CM: 305.1             Visit Diagnoses:    ICD-10-CM ICD-9-CM   1. Alcohol use disorder, severe, dependence  F10.20 303.90   2. Cannabis use without complication  F12.90 305.20   3. MALIK (generalized anxiety disorder)  F41.1 300.02   4. Moderate episode of recurrent major depressive disorder  F33.1 296.32   5. Other tobacco product nicotine dependence, uncomplicated  F17.290 305.1       PLAN:  Safety: No acute safety concerns  Risk Assessment: Risk of self-harm acutely is low. Risk of self-harm chronically is also low, but could be further elevated in the event of treatment noncompliance and/or AODA.    TREATMENT PLAN: Continue supportive psychotherapy efforts and medications as indicated. Treatment and medication options discussed during today's visit. Patient acknowledged and verbally consented to continue with current treatment plan and was educated on the importance of compliance with treatment and follow-up appointments.    GOALS:  Short Term Goals: Patient will be compliant with medication, and patient will have no significant medication related side effects.  Patient will be engaged in psychotherapy as indicated.  Patient will report subjective improvement of symptoms.  Long term goals: To stabilize mood and treat/improve subjective symptoms, the patient will stay out of the hospital, the patient will be at an optimal level of  functioning, and the patient will take all medications as prescribed.  The patient/guardian verbalized understanding and agreement with goals that were mutually set.    MEDICATION ISSUES:  DHARA reviewed as expected.  Discussed medication options and treatment plan of prescribed medication as well as the risks, benefits, and side effects including potential falls, possible impaired driving and metabolic adversities among others. Patient is agreeable to call the office with any worsening of symptoms or onset of side effects. Patient is agreeable to call 911 or go to the nearest ER should he/she begin having SI/HI. No medication side effects or related complaints today.       TOBACCO USE:  Dips daily.  Agreeable to stop.    I advised Paula Missy Vijay of the risks of tobacco use.     MEDS ORDERED DURING VISIT:  New Medications Ordered This Visit   Medications    FLUoxetine (PROzac) 10 MG capsule     Sig: Take 1 capsule by mouth Daily.     Dispense:  30 capsule     Refill:  0    naloxone (NARCAN) 4 MG/0.1ML nasal spray     Si spray into the nostril(s) as directed by provider As Needed for Opioid Reversal or Respiratory Depression. use for oversedation and call 911     Dispense:  1 each     Refill:  2    nicotine (Nicoderm CQ) 21 MG/24HR patch     Sig: Place 1 patch on the skin as directed by provider Daily.     Dispense:  30 patch     Refill:  0       Return in about 4 weeks (around 2024) for Follow Up Medication.                 This document has been electronically signed by LULU Mack  2024 15:44 EST      Part of this note may be an electronic transcription/translation of spoken language to printed text using the Dragon Dictation System.

## 2024-01-29 ENCOUNTER — TELEPHONE (OUTPATIENT)
Dept: PSYCHIATRY | Facility: CLINIC | Age: 29
End: 2024-01-29
Payer: COMMERCIAL

## 2024-01-31 ENCOUNTER — LAB (OUTPATIENT)
Dept: LAB | Facility: HOSPITAL | Age: 29
End: 2024-01-31
Payer: COMMERCIAL

## 2024-01-31 DIAGNOSIS — F10.20 ALCOHOL USE DISORDER, SEVERE, DEPENDENCE: ICD-10-CM

## 2024-01-31 LAB
AMPHET+METHAMPHET UR QL: NEGATIVE
AMPHETAMINES UR QL: NEGATIVE
BARBITURATES UR QL SCN: NEGATIVE
BENZODIAZ UR QL SCN: NEGATIVE
BUPRENORPHINE SERPL-MCNC: NEGATIVE NG/ML
CANNABINOIDS SERPL QL: POSITIVE
COCAINE UR QL: NEGATIVE
METHADONE UR QL SCN: NEGATIVE
OPIATES UR QL: NEGATIVE
OXYCODONE UR QL SCN: NEGATIVE
PCP UR QL SCN: NEGATIVE
TRICYCLICS UR QL SCN: NEGATIVE

## 2024-01-31 PROCEDURE — 80306 DRUG TEST PRSMV INSTRMNT: CPT

## 2024-02-05 LAB
1OH-MIDAZOLAM UR CFM-MCNC: NEGATIVE NG/ML
6MAM UR CFM-MCNC: NEGATIVE NG/ML
7AMINOCLONAZEPAM UR CFM-MCNC: NEGATIVE NG/ML
7AMINOCLONAZEPAM UR CFM-MCNC: NEGATIVE NG/ML
A-OH ALPRAZ UR CFM-MCNC: NEGATIVE NG/ML
ALPRAZ UR CFM-MCNC: NEGATIVE NG/ML
AMOBARBITAL UR CFM-MCNC: NEGATIVE NG/ML
AMPHET UR CFM-MCNC: NEGATIVE NG/ML
BUPRENORPHINE UR-MCNC: NEGATIVE NG/ML
BUTABARBITAL UR CFM-MCNC: NEGATIVE NG/ML
BUTALBITAL UR CFM-MCNC: NEGATIVE NG/ML
BZE UR CFM-MCNC: NEGATIVE NG/ML
CARBOXYTHC UR CFM-MCNC: 45 NG/ML
CARISOPRODOL UR CFM-MCNC: NEGATIVE NG/ML
CODEINE UR CFM-MCNC: NEGATIVE NG/ML
CREATININE: 113.2 MG/DL
DIAZEPAM UR CFM-MCNC: NEGATIVE NG/ML
EDDP UR CFM-MCNC: NEGATIVE NG/ML
ETHYL GLUCURONIDE UR CFM-MCNC: NEGATIVE NG/ML
ETS: NEGATIVE NG/ML
FENTANYL UR-MCNC: NEGATIVE NG/ML
GABAPENTIN UR CFM-MCNC: NEGATIVE NG/ML
HYDROCODONE UR CFM-MCNC: NEGATIVE NG/ML
HYDROMORPHONE UR CFM-MCNC: NEGATIVE NG/ML
LORAZEPAM UR CFM-MCNC: NEGATIVE NG/ML
MDMA UR CFM-MCNC: NEGATIVE NG/ML
ME-PHENIDATE UR CFM-MCNC: NEGATIVE NG/ML
METHADONE UR CFM-MCNC: NEGATIVE NG/ML
METHAMPHET UR CFM-MCNC: NEGATIVE NG/ML
MIDAZOLAM UR CFM-MCNC: NEGATIVE NG/ML
MORPHINE UR CFM-MCNC: NEGATIVE NG/ML
NORBUPRENORPHINE UR CFM-MCNC: NEGATIVE NG/ML
NORDIAZEPAM UR CFM-MCNC: NEGATIVE NG/ML
NORFENTANYL UR CFM-MCNC: NEGATIVE NG/ML
NORHYDROCODONE UR CFM-MCNC: NEGATIVE NG/ML
NOROXYCODONE UR CFM-MCNC: NEGATIVE NG/ML
OXAZEPAM CTO UR CFM-MCNC: NEGATIVE NG/ML
OXYCODONE SERPL-MCNC: NEGATIVE NG/ML
OXYMORPHONE SERPL-MCNC: NEGATIVE NG/ML
PCP UR CFM-MCNC: NEGATIVE NG/ML
PH: 6.1 (ref 4.5–9)
PHENOBARB UR CFM-MCNC: NEGATIVE NG/ML
PHENTERMINE: NEGATIVE NG/ML
PPAA UR CFM-MCNC: NEGATIVE NG/ML
PREGABALIN UR CFM-MCNC: NEGATIVE NG/ML
SECOBARBITAL UR CFM-MCNC: NEGATIVE NG/ML
SPECIFIC GRAVITY: 1.01 (ref 1–1.03)
TAPENTADOL UR CFM-MCNC: NEGATIVE NG/ML
TEMAZEPAM UR CFM-MCNC: NEGATIVE NG/ML
TRAMADOL: NEGATIVE NG/ML
ZOLPIDEM PHENYL-4-CARB UR CFM-MCNC: NEGATIVE NG/ML
ZOLPIDEM UR CFM-MCNC: NEGATIVE NG/ML

## 2024-02-19 ENCOUNTER — OFFICE VISIT (OUTPATIENT)
Dept: PSYCHIATRY | Facility: CLINIC | Age: 29
End: 2024-02-19
Payer: COMMERCIAL

## 2024-02-19 DIAGNOSIS — F10.20 ALCOHOL USE DISORDER, SEVERE, DEPENDENCE: Primary | ICD-10-CM

## 2024-02-19 PROCEDURE — 90791 PSYCH DIAGNOSTIC EVALUATION: CPT | Performed by: SOCIAL WORKER
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
## 2024-02-22 ENCOUNTER — OFFICE VISIT (OUTPATIENT)
Dept: PSYCHIATRY | Facility: CLINIC | Age: 29
End: 2024-02-22
Payer: COMMERCIAL

## 2024-02-22 VITALS
SYSTOLIC BLOOD PRESSURE: 118 MMHG | WEIGHT: 289 LBS | HEART RATE: 76 BPM | HEIGHT: 66 IN | BODY MASS INDEX: 46.45 KG/M2 | DIASTOLIC BLOOD PRESSURE: 90 MMHG | OXYGEN SATURATION: 98 %

## 2024-02-22 DIAGNOSIS — F41.1 GAD (GENERALIZED ANXIETY DISORDER): ICD-10-CM

## 2024-02-22 DIAGNOSIS — J45.41 MODERATE PERSISTENT ASTHMATIC BRONCHITIS WITH ACUTE EXACERBATION: ICD-10-CM

## 2024-02-22 RX ORDER — ALBUTEROL SULFATE 90 UG/1
2 AEROSOL, METERED RESPIRATORY (INHALATION) EVERY 4 HOURS PRN
Qty: 18 G | Refills: 0 | Status: SHIPPED | OUTPATIENT
Start: 2024-02-22

## 2024-02-22 RX ORDER — FLUOXETINE 10 MG/1
10 CAPSULE ORAL DAILY
Qty: 30 CAPSULE | Refills: 1 | Status: SHIPPED | OUTPATIENT
Start: 2024-02-22

## 2024-02-22 NOTE — PROGRESS NOTES
Office  Follow Up Visit      Patient Name: Paula Linares  : 1995   MRN: [id number]     Referring Provider: Lexy Gonzales APRN    Chief Complaint: Substance use    History of Present Illness:   Paula Linares is a 28 y.o. female who is here today for follow up related to substance use and mood.  Maintains sober date of 2023 from alcohol and  has had no further marijuana intake since 2024.  Denies any recent cravings for any substance or triggering events.  Completed IOP screener and starts on Monday.  A little bit nervous but ready to get started.  Started on fluoxetine 10 mg daily at last visit for MDD and MALIK.  Reported symptoms of being easily annoyed, constant worry, depressed mood, lack of motivation/interest, daily fatigue, and feelings of guilt at times.  All symptoms have improved with fluoxetine use.  Feels she is doing well on current dosing and declines any dose adjustments.  Does feel it makes her tired in the mornings.  No other AE.  Denies any SI/HI, AVH.  Has not utilized nicotine 21 mg daily patch as yet but has cut back to 2 cans of tobacco per day.  Requesting refill on albuterol inhaler as she has not been able to get into primary care.  Allergens have caused some mild SOI, wheezing.  No other complaints today.    Triggers: Habit, to relax    Cravings: Denies    Relapse Prevention: IOP, individual therapy as needed, psych medication management, recovery meetings    Urine Drug Screen (today's visit) discussed: Ordered    UDS Confirmation: 2024 positive THC    DHARA (PDMP) Reviewed for Current/Active Medications: No active medications    Past Surgical History:  Past Surgical History:   Procedure Laterality Date    ANKLE SURGERY Left     FRACTURE SURGERY         Problem List:  There is no problem list on file for this patient.      Allergy:   No Known Allergies     Current Medications:   Current Outpatient Medications   Medication Sig Dispense  Refill    albuterol sulfate  (90 Base) MCG/ACT inhaler Inhale 2 puffs Every 4 (Four) Hours As Needed for Wheezing. 18 g 0    FLUoxetine (PROzac) 10 MG capsule Take 1 capsule by mouth Daily. 30 capsule 1    loratadine (CLARITIN) 10 MG tablet Take 1 tablet by mouth Daily.      mometasone-formoterol (DULERA 100) 100-5 MCG/ACT inhaler Inhale 2 puffs 2 (Two) Times a Day. 1 each 0    naloxone (NARCAN) 4 MG/0.1ML nasal spray 1 spray into the nostril(s) as directed by provider As Needed for Opioid Reversal or Respiratory Depression. use for oversedation and call 911 1 each 2    nicotine (Nicoderm CQ) 21 MG/24HR patch Place 1 patch on the skin as directed by provider Daily. 30 patch 0     No current facility-administered medications for this visit.       Past Medical History:  Past Medical History:   Diagnosis Date    Alcohol abuse     Sober 2 months    Alcoholism     Anxiety     Asthma     Depression     Environmental and seasonal allergies        Social History:  Social History     Socioeconomic History    Marital status: Single   Tobacco Use    Smoking status: Never     Passive exposure: Past    Smokeless tobacco: Current     Types: Chew   Vaping Use    Vaping Use: Never used   Substance and Sexual Activity    Alcohol use: Not Currently    Drug use: Not Currently    Sexual activity: Yes     Partners: Female     Birth control/protection: Same-sex partner       Family History:  Family History   Problem Relation Age of Onset    Drug abuse Mother     Alcohol abuse Father     Alcohol abuse Maternal Grandfather     Anxiety disorder Sister     Depression Sister          Subjective      Review of Systems:   Review of Systems   Constitutional:  Positive for fatigue. Negative for chills and fever.   Respiratory:  Negative for shortness of breath.    Cardiovascular:  Negative for chest pain.   Gastrointestinal:  Negative for abdominal pain.   Skin:  Negative for skin lesions.   Neurological:  Negative for seizures and  confusion.   Psychiatric/Behavioral:  Positive for stress. Negative for hallucinations, sleep disturbance, suicidal ideas and depressed mood. The patient is not nervous/anxious.        PHQ-9 Depression Screening  Little interest or pleasure in doing things? 0-->not at all   Feeling down, depressed, or hopeless? 0-->not at all   Trouble falling or staying asleep, or sleeping too much? 1-->several days   Feeling tired or having little energy? 1-->several days   Poor appetite or overeating? 0-->not at all   Feeling bad about yourself - or that you are a failure or have let yourself or your family down? 0-->not at all   Trouble concentrating on things, such as reading the newspaper or watching television? 0-->not at all   Moving or speaking so slowly that other people could have noticed? Or the opposite - being so fidgety or restless that you have been moving around a lot more than usual? 0-->not at all   Thoughts that you would be better off dead, or of hurting yourself in some way? 0-->not at all   PHQ-9 Total Score 2   If you checked off any problems, how difficult have these problems made it for you to do your work, take care of things at home, or get along with other people? not difficult at all        MALIK-7 Score:   Feeling nervous, anxious or on edge: Not at all  Not being able to stop or control worrying: Not at all  Worrying too much about different things: Not at all  Trouble Relaxing: Several days  Being so restless that it is hard to sit still: Not at all  Feeling afraid as if something awful might happen: Not at all  Becoming easily annoyed or irritable: Not at all  MALIK 7 Total Score: 1  If you checked any problems, how difficult have these problems made it for you to do your work, take care of things at home, or get along with other people: Not difficult at all    Patient History:   The following portions of the patient's history were reviewed and updated as appropriate: allergies, current medications,  past family history, past medical history, past social history, past surgical history and problem list.     Social:  Social History     Socioeconomic History    Marital status: Single   Tobacco Use    Smoking status: Never     Passive exposure: Past    Smokeless tobacco: Current     Types: Chew   Vaping Use    Vaping Use: Never used   Substance and Sexual Activity    Alcohol use: Not Currently    Drug use: Not Currently    Sexual activity: Yes     Partners: Female     Birth control/protection: Same-sex partner       Medications:     Current Outpatient Medications:     albuterol sulfate  (90 Base) MCG/ACT inhaler, Inhale 2 puffs Every 4 (Four) Hours As Needed for Wheezing., Disp: 18 g, Rfl: 0    FLUoxetine (PROzac) 10 MG capsule, Take 1 capsule by mouth Daily., Disp: 30 capsule, Rfl: 1    loratadine (CLARITIN) 10 MG tablet, Take 1 tablet by mouth Daily., Disp: , Rfl:     mometasone-formoterol (DULERA 100) 100-5 MCG/ACT inhaler, Inhale 2 puffs 2 (Two) Times a Day., Disp: 1 each, Rfl: 0    naloxone (NARCAN) 4 MG/0.1ML nasal spray, 1 spray into the nostril(s) as directed by provider As Needed for Opioid Reversal or Respiratory Depression. use for oversedation and call 911, Disp: 1 each, Rfl: 2    nicotine (Nicoderm CQ) 21 MG/24HR patch, Place 1 patch on the skin as directed by provider Daily., Disp: 30 patch, Rfl: 0    Objective     Physical Exam:  Physical Exam  Vitals reviewed.   Constitutional:       General: She is not in acute distress.     Appearance: She is well-developed. She is not ill-appearing.   Pulmonary:      Effort: No respiratory distress.   Neurological:      Mental Status: She is alert and oriented to person, place, and time.      Gait: Gait normal.   Psychiatric:         Attention and Perception: Attention normal.         Mood and Affect: Mood and affect normal.         Speech: Speech normal.         Behavior: Behavior normal. Behavior is cooperative.         Thought Content: Thought content is  "not paranoid or delusional. Thought content does not include homicidal or suicidal ideation. Thought content does not include homicidal or suicidal plan.         Cognition and Memory: Cognition and memory normal.         Vital Signs:   Vitals:    02/22/24 1356   BP: 118/90   Pulse: 76   SpO2: 98%   Weight: 131 kg (289 lb)   Height: 167.6 cm (66\")     Body mass index is 46.65 kg/m².     Mental Status Exam:   Hygiene:   good  Cooperation:  Cooperative  Eye Contact:  Good  Psychomotor Behavior:  Appropriate  Affect:  Full range  Mood: normal  Speech:  Normal  Thought Process:  Goal directed  Thought Content:  Normal  Suicidal:  None  Homicidal:  None  Hallucinations:  None  Delusion:  None  Memory:  Intact  Orientation:  Person, Place, Time, and Situation  Reliability:  good  Insight:  Good  Judgement:  Fair  Impulse Control:  Fair    Assessment / Plan      -Continue fluoxetine 10 mg daily.  Will switch to taking at nighttime as it has been making her sleepy for 2 to 3 hours after taking.  Will call if fatigue persists.   -Encouraged her to utilize  nicotine 21 mg every 24 hour patch as directed.  Do not dip with patch on.  Discussed AE of medication and when to call/RTC.  -Albuterol sulfate 108 mcg per actuation inhaler-inhale 2 puffs every 4 hours as needed for shortness of breath and wheezing.  Follow-up with PCP.  -Continue individual therapy   -Will start IOP Monday.  -Congratulated her on her efforts on her sober journey this far    Assessment & Plan   Problems Addressed this Visit    None  Visit Diagnoses       MALIK (generalized anxiety disorder)        Relevant Medications    FLUoxetine (PROzac) 10 MG capsule    Moderate persistent asthmatic bronchitis with acute exacerbation        Relevant Medications    albuterol sulfate  (90 Base) MCG/ACT inhaler          Diagnoses         Codes Comments    MALIK (generalized anxiety disorder)     ICD-10-CM: F41.1  ICD-9-CM: 300.02     Moderate persistent asthmatic " bronchitis with acute exacerbation     ICD-10-CM: J45.41  ICD-9-CM: 493.92               PLAN:  Safety: No acute safety concerns  Risk Assessment: Risk of self-harm acutely is low. Risk of self-harm chronically is also low, but could be further elevated in the event of treatment noncompliance and/or AODA.      TREATMENT PLAN/GOALS: Continue supportive psychotherapy efforts and medications as indicated. Treatment and medication options discussed during today's visit. Patient acknowledged and verbally consented to continue with current treatment plan and was educated on the importance of compliance with treatment and follow-up appointments.      MEDICATION ISSUES:  DHARA reviewed as expected.  Discussed medication options and treatment plan of prescribed medication as well as the risks, benefits, and side effects including potential falls, possible impaired driving and metabolic adversities among others. Patient is agreeable to call the office with any worsening of symptoms or onset of side effects. Patient is agreeable to call 911 or go to the nearest ER should he/she begin having SI/HI. No medication side effects or related complaints today.     MEDS ORDERED DURING VISIT:  New Medications Ordered This Visit   Medications    FLUoxetine (PROzac) 10 MG capsule     Sig: Take 1 capsule by mouth Daily.     Dispense:  30 capsule     Refill:  1    albuterol sulfate  (90 Base) MCG/ACT inhaler     Sig: Inhale 2 puffs Every 4 (Four) Hours As Needed for Wheezing.     Dispense:  18 g     Refill:  0     Proair, Ventolin, or Proventil--whatever brand insurance will cover       Return in about 8 weeks (around 4/18/2024).             This document has been electronically signed by LULU Mack  February 22, 2024 14:14 EST      Part of this note may be an electronic transcription/translation of spoken language to printed text using the Dragon Dictation System.

## 2024-02-26 ENCOUNTER — OFFICE VISIT (OUTPATIENT)
Dept: PSYCHIATRY | Facility: HOSPITAL | Age: 29
End: 2024-02-26
Payer: COMMERCIAL

## 2024-02-26 DIAGNOSIS — F10.20 ALCOHOL USE DISORDER, SEVERE, DEPENDENCE: Primary | ICD-10-CM

## 2024-02-26 PROCEDURE — H0015 ALCOHOL AND/OR DRUG SERVICES: HCPCS | Performed by: NURSE PRACTITIONER

## 2024-02-27 ENCOUNTER — LAB (OUTPATIENT)
Dept: LAB | Facility: HOSPITAL | Age: 29
End: 2024-02-27
Payer: COMMERCIAL

## 2024-02-27 DIAGNOSIS — F10.20 ALCOHOL USE DISORDER, SEVERE, DEPENDENCE: ICD-10-CM

## 2024-02-27 PROCEDURE — 80306 DRUG TEST PRSMV INSTRMNT: CPT

## 2024-02-28 ENCOUNTER — OFFICE VISIT (OUTPATIENT)
Dept: PSYCHIATRY | Facility: HOSPITAL | Age: 29
End: 2024-02-28
Payer: COMMERCIAL

## 2024-02-28 DIAGNOSIS — F10.20 ALCOHOL USE DISORDER, SEVERE, DEPENDENCE: Primary | ICD-10-CM

## 2024-02-28 PROCEDURE — H0015 ALCOHOL AND/OR DRUG SERVICES: HCPCS | Performed by: NURSE PRACTITIONER

## 2024-02-29 ENCOUNTER — OFFICE VISIT (OUTPATIENT)
Dept: PSYCHIATRY | Facility: HOSPITAL | Age: 29
End: 2024-02-29
Payer: COMMERCIAL

## 2024-02-29 DIAGNOSIS — F10.20 ALCOHOL USE DISORDER, SEVERE, DEPENDENCE: Primary | ICD-10-CM

## 2024-02-29 PROCEDURE — H0015 ALCOHOL AND/OR DRUG SERVICES: HCPCS | Performed by: NURSE PRACTITIONER

## 2024-02-29 NOTE — PROGRESS NOTES
CD IOP GROUP     Date: 02/28/2024  Name: Paula Linares    Time In: 1800   Time Out: 2038     Number of participants: 11    IOP GROUP NOTE     Data: 3 hour IOP group therapy session (Check-ins, Coping Skills, Relapse Prevention)     Check Ins: Therapist continued facilitation of rapport building strategies between group members. Therapist asked that each patient check in with home life and recovery efforts and identify triggers, cravings, and high risk situations that arise between group sessions. Therapist provided empathy and support during group session.     Session Content/Coping Skills: Kaia  and Juan [facility]  attended for first part of session. Jazmin, [employer] [facility] [employer] student, also attended for first part of session.  shared Just for Today reading. Group members discussed their experiences with anger management. Check ins were completed by group members. Relapse Prevention plans shared and discussed by each group member in attendance.     Response: Patient attended class in person. Patient participated in completion of check in form. Patient on check in form answered no to question of “currently or since your last group meeting, have you had any suicidal thoughts or plan or intent to hurt yourself”, and patient also answered no on check in form to question of “currently or since your last group meeting, have you had any homicidal thoughts or plan or intent to hurt others”.     Personal Assessment 0-10 Scale (0-none, 10-high)    Anxiety:  2   Depression:  0   Cravings: 0     Assessment:     ..  Lab on 02/27/2024   Component Date Value Ref Range Status    THC, Screen, Urine 02/27/2024 Positive (A)  Negative Final    Phencyclidine (PCP), Urine 02/27/2024 Negative  Negative Final    Cocaine Screen, Urine 02/27/2024 Negative  Negative Final    Methamphetamine, Ur 02/27/2024 Negative  Negative Final    Opiate Screen  02/27/2024 Negative  Negative Final    Amphetamine Screen, Urine 02/27/2024 Negative  Negative Final    Benzodiazepine Screen, Urine 02/27/2024 Negative  Negative Final    Tricyclic Antidepressants Screen 02/27/2024 Negative  Negative Final    Methadone Screen, Urine 02/27/2024 Negative  Negative Final    Barbiturates Screen, Urine 02/27/2024 Negative  Negative Final    Oxycodone Screen, Urine 02/27/2024 Negative  Negative Final    Buprenorphine, Screen, Urine 02/27/2024 Negative  Negative Final       Mental Status Exam  Hygiene:  good  Dress: casual  Attitude: cooperative and agreeable   Motor Activity: appropriate  Eye Contact:  good  Speech: regular rate and rhythm   Mood:  calm and cooperative  Affect:  Appropriate  Thought Processes:  Linear  Thought Content:  Normal  Suicidal Thoughts:  denies  Homicidal Thoughts:  denies  Crisis Safety Plan: Safety plan has been discussed.   Hallucinations:  Unknown to clinician.   Reliability: fair  Insight: fair  Judgement: fair  Impulse Control: fair    Recovery/spiritual support group attendance: No.     Progress toward goal: Not at goal    Prognosis: Fair with Ongoing Treatment     Self-reported number of days sober: Patient on check in form reported Alc Nov, [place] [facility].     Patient will contact this office, call 911 or present to the nearest emergency room should suicidal or homicidal ideations occur.    Impression/Formulation:    ICD-10-CM ICD-9-CM   1. Alcohol use disorder, severe, dependence  F10.20 303.90       Clinical Maneuvering/Interventions: Therapist utilized a person-centered approach to build rapport with group member. Therapist implemented motivational interviewing techniques to assist client with exploring and resolving ambivalence associated with commitment to change behaviors related to substance use and addiction. Therapist applied cognitive behavioral strategies to facilitate identification of maladaptive patterns of thinking and behavior that contribute  to client's risk for continued substance use and relapse. Therapist employed group interaction activities to build rapport among group members, promote sobriety, and emphasize relapse prevention. Therapist promoted safe nonjudgmental environment by providing group members with unconditional positive regard and encouraging group members to comply with group rules and guidelines. Therapist assisted group member with identifying and implementing healthier coping strategies.      Plan:  Continue [facility] IOP Phase I   Aftercare:  [facility] Phase II  Program Assignments:  Personal recovery plan, relapse prevention plan, attendance of recovery support group meetings, exploration of sponsorship, drug/alcohol screens.     Stan Powell LCSW  2/29/2024  16:26 EST

## 2024-02-29 NOTE — PROGRESS NOTES
"CD IOP GROUP     Date: 02/26/2024  Name: Paula Linares    Time In: 1801   Time Out: 2052     Number of participants: 11.    IOP GROUP NOTE     Data: 3 hour IOP group therapy session (Check-ins, Coping Skills, Relapse Prevention)     Check Ins: Therapist continued facilitation of rapport building strategies between group members. Therapist asked that each patient check in with home life and recovery efforts and identify triggers, cravings, and high risk situations that arise between group sessions. Therapist provided empathy and support during group session.     Session Content/Coping Skills: Kaia  and Juan [facility]  attended for first part of session. Check ins completed by group members. Article “Team Roles” reviewed, and clinician discussed with group members the roles of leader, challenger, doer, thinker, and supporter (retrieved from [employer]). Clinician discussed with group members what roles they had taken on before. Group members were assigned one of the roles listed in the article. Using their designated role, group members were given the assignment of making a CD-IOP group profile. The group was asked to include in the profile what newcomers or outsiders should know about IOP, Addiction, and Recovery. Group members completed this activity and discussed their motivations for recovery and shared their knowledge on addiction and recovery with other group members. This activity was processed with the group. Group members were assigned relapse prevention plans as homework.    Response: Patient attended class in person. Patient participated in completion of check in form. Patient on check in form answered no to question of \"currently or in the past 7 days, have you had any suicidal thoughts or plan or intent to hurt yourself”, and patient also answered no on check in form to question of \"currently or in the past 7 days, " "have you had any homicidal thoughts or plan or intent to hurt others\".     Personal Assessment 0-10 Scale (0-none, 10-high)    Anxiety:  3   Depression:  0   Cravings: 0     Assessment:     ..  No visits with results within 3 Week(s) from this visit.   Latest known visit with results is:   Lab on 01/31/2024   Component Date Value Ref Range Status    THC, Screen, Urine 01/31/2024 Positive (A)  Negative Final    Phencyclidine (PCP), Urine 01/31/2024 Negative  Negative Final    Cocaine Screen, Urine 01/31/2024 Negative  Negative Final    Methamphetamine, Ur 01/31/2024 Negative  Negative Final    Opiate Screen 01/31/2024 Negative  Negative Final    Amphetamine Screen, Urine 01/31/2024 Negative  Negative Final    Benzodiazepine Screen, Urine 01/31/2024 Negative  Negative Final    Tricyclic Antidepressants Screen 01/31/2024 Negative  Negative Final    Methadone Screen, Urine 01/31/2024 Negative  Negative Final    Barbiturates Screen, Urine 01/31/2024 Negative  Negative Final    Oxycodone Screen, Urine 01/31/2024 Negative  Negative Final    Buprenorphine, Screen, Urine 01/31/2024 Negative  Negative Final    PH 01/31/2024 6.1  4.5 - 9 Final    CREATININE 01/31/2024 113.2  20 - 300 mg/dL mg/dL Final    SPECIFIC GRAVITY 01/31/2024 1.008  1.003 - 1.035 Final    CODEINE 01/31/2024 Negative  50 ng/ml ng/ml Final    HYDROCODONE 01/31/2024 Negative  50 ng/ml ng/ml Final    NORHYDROCODONE 01/31/2024 Negative  50 ng/ml ng/ml Final    HYDROMORPHONE 01/31/2024 Negative  50 ng/ml ng/ml Final    MORPHINE 01/31/2024 Negative  50 ng/ml ng/ml Final    FENTANYL 01/31/2024 Negative  2 ng/ml ng/ml Final    NORFENTANYL 01/31/2024 Negative  10 ng/ml ng/ml Final    METHADONE 01/31/2024 Negative  25 ng/ml ng/ml Final    EDDP 01/31/2024 Negative  50 ng/ml ng/ml Final    OXYCODONE 01/31/2024 Negative  50 ng/ml ng/ml Final    NOROXYCODONE 01/31/2024 Negative  50 ng/ml ng/ml Final    OXYMORPHONE 01/31/2024 Negative  50 ng/ml ng/ml Final    TAPENTADOL " 01/31/2024 Negative  50 ng/ml ng/ml Final    TRAMADOL 01/31/2024 Negative  50 ng/ml ng/ml Final    BUPRENORPHINE 01/31/2024 Negative  7.5 ng/ml ng/ml Final    NORBUPRENORPHINE 01/31/2024 Negative  10 ng/ml ng/ml Final    AMOBARBITAL 01/31/2024 Negative  100 ng/ml ng/ml Final    BUTABARBITAL 01/31/2024 Negative  100 ng/ml ng/ml Final    BUTALBITAL 01/31/2024 Negative  100 ng/ml ng/ml Final    PHENOBARBITAL 01/31/2024 Negative  100 ng/ml ng/ml Final    SECOBARBITAL 01/31/2024 Negative  100 ng/ml ng/ml Final    ALPRAZOLAM 01/31/2024 Negative  50 ng/ml ng/ml Final    ALPHA-HYDROXYALPRAZOLAM 01/31/2024 Negative  50 ng/ml ng/ml Final    CLONAZEPAM 01/31/2024 Negative  50 ng/ml ng/ml Final    7- AMINOCLONAZEPAM 01/31/2024 Negative  50 ng/ml ng/ml Final    DIAZEPAM 01/31/2024 Negative  50 ng/ml ng/ml Final    NORDIAZEPAM 01/31/2024 Negative  50 ng/ml ng/ml Final    LORAZEPAM 01/31/2024 Negative  100 ng/ml ng/ml Final    MIDAZOLAM 01/31/2024 Negative  50 ng/ml ng/ml Final    ALPHA-HYDROXYMIDAZOLAM 01/31/2024 Negative  50 ng/ml ng/ml Final    OXAZEPAM 01/31/2024 Negative  50 ng/ml ng/ml Final    TEMAZEPAM 01/31/2024 Negative  50 ng/ml ng/ml Final    ETG 01/31/2024 Negative  200 ng/ml ng/ml Final    ETS 01/31/2024 Negative  50 ng/ml ng/ml Final    BENZOYLECGONINE 01/31/2024 Negative  100 ng/ml ng/ml Final    6-NELIA 01/31/2024 Negative  10 ng/ml ng/ml Final    MDMA 01/31/2024 Negative  100 ng/ml ng/ml Final    PCP 01/31/2024 Negative  20 ng/ml ng/ml Final    DELTA-9-THC 01/31/2024 45 (C)  20 ng/ml ng/ml Final    AMPHETAMINE 01/31/2024 Negative  250 ng/ml ng/ml Final    METHAMPHETAMINE 01/31/2024 Negative  100 ng/ml ng/ml Final    METHYLPHENIDATE 01/31/2024 Negative  10 ng/ml ng/ml Final    PHENTERMINE 01/31/2024 Negative  100 ng/ml ng/ml Final    RITALINIC ACID 01/31/2024 Negative  50 ng/ml ng/ml Final    CARISOPRODOL 01/31/2024 Negative  100 ng/ml ng/ml Final    GABAPENTIN 01/31/2024 Negative  500 ng/ml ng/ml Final     PREGABALIN 01/31/2024 Negative  250 ng/ml ng/ml Final    ZOLPIDEM 01/31/2024 Negative  2 ng/ml ng/ml Final    CARBOXYZOLPIDEM 01/31/2024 Negative  10 ng/ml ng/ml Final       Mental Status Exam  Hygiene:  good  Dress: casual  Attitude: cooperative and agreeable   Motor Activity: appropriate  Eye Contact:  good  Speech: regular rate and rhythm   Mood:  calm and cooperative  Affect:  Appropriate  Thought Processes:  Linear  Thought Content:  Normal  Suicidal Thoughts:  denies  Homicidal Thoughts:  denies  Crisis Safety Plan: Safety plan has been discussed.   Hallucinations:  Unknown to clinician.   Reliability: fair  Insight: fair  Judgement: fair  Impulse Control: fair    Recovery/spiritual support group attendance: No.     Progress toward goal: Not at goal    Prognosis: Fair with Ongoing Treatment     Self-reported number of days sober: Patient on check in form reported alcohol in November, Weed last month.     Patient will contact this office, call 911 or present to the nearest emergency room should suicidal or homicidal ideations occur.    Impression/Formulation:    ICD-10-CM ICD-9-CM   1. Alcohol use disorder, severe, dependence  F10.20 303.90       Clinical Maneuvering/Interventions: Therapist utilized a person-centered approach to build rapport with group member. Therapist implemented motivational interviewing techniques to assist client with exploring and resolving ambivalence associated with commitment to change behaviors related to substance use and addiction. Therapist applied cognitive behavioral strategies to facilitate identification of maladaptive patterns of thinking and behavior that contribute to client's risk for continued substance use and relapse. Therapist employed group interaction activities to build rapport among group members, promote sobriety, and emphasize relapse prevention. Therapist promoted safe nonjudgmental environment by providing group members with unconditional positive regard and  encouraging group members to comply with group rules and guidelines. Therapist assisted group member with identifying and implementing healthier coping strategies.      Plan:  Continue [facility] IOP Phase I   Aftercare:  [facility] Phase II  Program Assignments:  Personal recovery plan, relapse prevention plan, attendance of recovery support group meetings, exploration of sponsorship, drug/alcohol screens.     Stan Powell LCSW  2/29/2024  13:14 EST

## 2024-03-01 LAB
1OH-MIDAZOLAM UR CFM-MCNC: NEGATIVE NG/ML
6MAM UR CFM-MCNC: NEGATIVE NG/ML
7AMINOCLONAZEPAM UR CFM-MCNC: NEGATIVE NG/ML
7AMINOCLONAZEPAM UR CFM-MCNC: NEGATIVE NG/ML
A-OH ALPRAZ UR CFM-MCNC: NEGATIVE NG/ML
ALPRAZ UR CFM-MCNC: NEGATIVE NG/ML
AMOBARBITAL UR CFM-MCNC: NEGATIVE NG/ML
AMPHET UR CFM-MCNC: NEGATIVE NG/ML
BUPRENORPHINE UR-MCNC: NEGATIVE NG/ML
BUTABARBITAL UR CFM-MCNC: NEGATIVE NG/ML
BUTALBITAL UR CFM-MCNC: NEGATIVE NG/ML
BZE UR CFM-MCNC: NEGATIVE NG/ML
CARBOXYTHC UR CFM-MCNC: 62 NG/ML
CARISOPRODOL UR CFM-MCNC: NEGATIVE NG/ML
CODEINE UR CFM-MCNC: NEGATIVE NG/ML
CREATININE: 270.5 MG/DL
DIAZEPAM UR CFM-MCNC: NEGATIVE NG/ML
EDDP UR CFM-MCNC: NEGATIVE NG/ML
ETHYL GLUCURONIDE UR CFM-MCNC: NEGATIVE NG/ML
ETS: NEGATIVE NG/ML
FENTANYL UR-MCNC: NEGATIVE NG/ML
GABAPENTIN UR CFM-MCNC: NEGATIVE NG/ML
HYDROCODONE UR CFM-MCNC: NEGATIVE NG/ML
HYDROMORPHONE UR CFM-MCNC: NEGATIVE NG/ML
LORAZEPAM UR CFM-MCNC: NEGATIVE NG/ML
MDMA UR CFM-MCNC: NEGATIVE NG/ML
ME-PHENIDATE UR CFM-MCNC: NEGATIVE NG/ML
METHADONE UR CFM-MCNC: NEGATIVE NG/ML
METHAMPHET UR CFM-MCNC: NEGATIVE NG/ML
MIDAZOLAM UR CFM-MCNC: NEGATIVE NG/ML
MORPHINE UR CFM-MCNC: NEGATIVE NG/ML
NORBUPRENORPHINE UR CFM-MCNC: NEGATIVE NG/ML
NORDIAZEPAM UR CFM-MCNC: NEGATIVE NG/ML
NORFENTANYL UR CFM-MCNC: NEGATIVE NG/ML
NORHYDROCODONE UR CFM-MCNC: NEGATIVE NG/ML
NOROXYCODONE UR CFM-MCNC: NEGATIVE NG/ML
OXAZEPAM CTO UR CFM-MCNC: NEGATIVE NG/ML
OXYCODONE SERPL-MCNC: NEGATIVE NG/ML
OXYMORPHONE SERPL-MCNC: NEGATIVE NG/ML
PCP UR CFM-MCNC: NEGATIVE NG/ML
PH: 6.5 (ref 4.5–9)
PHENOBARB UR CFM-MCNC: NEGATIVE NG/ML
PHENTERMINE: NEGATIVE NG/ML
PPAA UR CFM-MCNC: NEGATIVE NG/ML
PREGABALIN UR CFM-MCNC: NEGATIVE NG/ML
SECOBARBITAL UR CFM-MCNC: NEGATIVE NG/ML
SPECIFIC GRAVITY: 1.03 (ref 1–1.03)
TAPENTADOL UR CFM-MCNC: NEGATIVE NG/ML
TEMAZEPAM UR CFM-MCNC: NEGATIVE NG/ML
TRAMADOL: NEGATIVE NG/ML
ZOLPIDEM PHENYL-4-CARB UR CFM-MCNC: NEGATIVE NG/ML
ZOLPIDEM UR CFM-MCNC: NEGATIVE NG/ML

## 2024-03-02 NOTE — PROGRESS NOTES
CD IOP GROUP     Date: 02/29/2024  Name: Paula Linares    Time In: 1800   Time Out: 2058     Number of participants: 11    IOP GROUP NOTE     Data: 3 hour IOP group therapy session (Check-ins, Coping Skills, Relapse Prevention)     Check Ins: Therapist continued facilitation of rapport building strategies between group members. Therapist asked that each patient check in with home life and recovery efforts and identify triggers, cravings, and high risk situations that arise between group sessions. Therapist provided empathy and support during group session.     Session Content/Coping Skills: [employer] student, attended session. , Kaia, also attended for the first part of session. Group members shared resources that they know of in the community. Each group member shared the best part of their sobriety that they had experienced. Check ins completed by group members. Clinician completed individual check in with each group member. [employer] student, reviewed and discussed “Tips for Avoiding Relapse” psychoeducational material with group members (Retrieved from [employer]). Group members participated in a [employer] Recovery Edition activity which reviewed recovery concepts discussed in -[facility]. Group members were asked to bring recovery resources to next class.     Response: Patient attended class in person. Patient participated in completion of check in form. Patient on check in form answered no to question of “currently or since your last group meeting, have you had any suicidal thoughts or plan or intent to hurt yourself”, and patient also answered no on check in form to question of “currently or since your last group meeting, have you had any homicidal thoughts or plan or intent to hurt others”.     Patient during individual check in denied suicidal or homicidal thoughts.     Personal Assessment 0-10 Scale (0-none, 10-high)    Anxiety:  2   Depression:  0   Cravings: 0      Assessment:     ..  Lab on 02/27/2024   Component Date Value Ref Range Status    THC, Screen, Urine 02/27/2024 Positive (A)  Negative Final    Phencyclidine (PCP), Urine 02/27/2024 Negative  Negative Final    Cocaine Screen, Urine 02/27/2024 Negative  Negative Final    Methamphetamine, Ur 02/27/2024 Negative  Negative Final    Opiate Screen 02/27/2024 Negative  Negative Final    Amphetamine Screen, Urine 02/27/2024 Negative  Negative Final    Benzodiazepine Screen, Urine 02/27/2024 Negative  Negative Final    Tricyclic Antidepressants Screen 02/27/2024 Negative  Negative Final    Methadone Screen, Urine 02/27/2024 Negative  Negative Final    Barbiturates Screen, Urine 02/27/2024 Negative  Negative Final    Oxycodone Screen, Urine 02/27/2024 Negative  Negative Final    Buprenorphine, Screen, Urine 02/27/2024 Negative  Negative Final    PH 02/27/2024 6.5  4.5 - 9 Final    CREATININE 02/27/2024 270.5  20 - 300 mg/dL mg/dL Final    SPECIFIC GRAVITY 02/27/2024 1.027  1.003 - 1.035 Final    CODEINE 02/27/2024 Negative  50 ng/ml ng/ml Final    HYDROCODONE 02/27/2024 Negative  50 ng/ml ng/ml Final    NORHYDROCODONE 02/27/2024 Negative  50 ng/ml ng/ml Final    HYDROMORPHONE 02/27/2024 Negative  50 ng/ml ng/ml Final    MORPHINE 02/27/2024 Negative  50 ng/ml ng/ml Final    FENTANYL 02/27/2024 Negative  2 ng/ml ng/ml Final    NORFENTANYL 02/27/2024 Negative  10 ng/ml ng/ml Final    METHADONE 02/27/2024 Negative  25 ng/ml ng/ml Final    EDDP 02/27/2024 Negative  50 ng/ml ng/ml Final    OXYCODONE 02/27/2024 Negative  50 ng/ml ng/ml Final    NOROXYCODONE 02/27/2024 Negative  50 ng/ml ng/ml Final    OXYMORPHONE 02/27/2024 Negative  50 ng/ml ng/ml Final    TAPENTADOL 02/27/2024 Negative  50 ng/ml ng/ml Final    TRAMADOL 02/27/2024 Negative  50 ng/ml ng/ml Final    BUPRENORPHINE 02/27/2024 Negative  7.5 ng/ml ng/ml Final    NORBUPRENORPHINE 02/27/2024 Negative  10 ng/ml ng/ml Final    AMOBARBITAL 02/27/2024 Negative  100 ng/ml  ng/ml Final    BUTABARBITAL 02/27/2024 Negative  100 ng/ml ng/ml Final    BUTALBITAL 02/27/2024 Negative  100 ng/ml ng/ml Final    PHENOBARBITAL 02/27/2024 Negative  100 ng/ml ng/ml Final    SECOBARBITAL 02/27/2024 Negative  100 ng/ml ng/ml Final    ALPRAZOLAM 02/27/2024 Negative  50 ng/ml ng/ml Final    ALPHA-HYDROXYALPRAZOLAM 02/27/2024 Negative  50 ng/ml ng/ml Final    CLONAZEPAM 02/27/2024 Negative  50 ng/ml ng/ml Final    7- AMINOCLONAZEPAM 02/27/2024 Negative  50 ng/ml ng/ml Final    DIAZEPAM 02/27/2024 Negative  50 ng/ml ng/ml Final    NORDIAZEPAM 02/27/2024 Negative  50 ng/ml ng/ml Final    LORAZEPAM 02/27/2024 Negative  100 ng/ml ng/ml Final    MIDAZOLAM 02/27/2024 Negative  50 ng/ml ng/ml Final    ALPHA-HYDROXYMIDAZOLAM 02/27/2024 Negative  50 ng/ml ng/ml Final    OXAZEPAM 02/27/2024 Negative  50 ng/ml ng/ml Final    TEMAZEPAM 02/27/2024 Negative  50 ng/ml ng/ml Final    ETG 02/27/2024 Negative  200 ng/ml ng/ml Final    ETS 02/27/2024 Negative  50 ng/ml ng/ml Final    BENZOYLECGONINE 02/27/2024 Negative  100 ng/ml ng/ml Final    6-NELIA 02/27/2024 Negative  10 ng/ml ng/ml Final    MDMA 02/27/2024 Negative  100 ng/ml ng/ml Final    PCP 02/27/2024 Negative  20 ng/ml ng/ml Final    DELTA-9-THC 02/27/2024 62 (C)  20 ng/ml ng/ml Final    AMPHETAMINE 02/27/2024 Negative  250 ng/ml ng/ml Final    METHAMPHETAMINE 02/27/2024 Negative  100 ng/ml ng/ml Final    METHYLPHENIDATE 02/27/2024 Negative  10 ng/ml ng/ml Final    PHENTERMINE 02/27/2024 Negative  100 ng/ml ng/ml Final    RITALINIC ACID 02/27/2024 Negative  50 ng/ml ng/ml Final    CARISOPRODOL 02/27/2024 Negative  100 ng/ml ng/ml Final    GABAPENTIN 02/27/2024 Negative  500 ng/ml ng/ml Final    PREGABALIN 02/27/2024 Negative  250 ng/ml ng/ml Final    ZOLPIDEM 02/27/2024 Negative  2 ng/ml ng/ml Final    CARBOXYZOLPIDEM 02/27/2024 Negative  10 ng/ml ng/ml Final       Mental Status Exam  Hygiene:  good  Dress: casual  Attitude: cooperative and agreeable   Motor  Activity: appropriate  Eye Contact:  good  Speech: regular rate and rhythm   Mood:  calm and cooperative  Affect:  Appropriate  Thought Processes:  Linear  Thought Content:  Normal  Suicidal Thoughts:  denies  Homicidal Thoughts:  denies  Crisis Safety Plan: Safety plan has been discussed.   Hallucinations:  Unknown to clinician.   Reliability: fair  Insight: fair  Judgement: fair  Impulse Control: fair    Recovery/spiritual support group attendance: No.     Progress toward goal: Not at goal    Prognosis: Fair with Ongoing Treatment     Self-reported number of days sober: Patient on check in form reported Alc Nov./ [place] [facility].    Patient will contact this office, call 911 or present to the nearest emergency room should suicidal or homicidal ideations occur.    Impression/Formulation:    ICD-10-CM ICD-9-CM   1. Alcohol use disorder, severe, dependence  F10.20 303.90       Clinical Maneuvering/Interventions: Therapist utilized a person-centered approach to build rapport with group member. Therapist implemented motivational interviewing techniques to assist client with exploring and resolving ambivalence associated with commitment to change behaviors related to substance use and addiction. Therapist applied cognitive behavioral strategies to facilitate identification of maladaptive patterns of thinking and behavior that contribute to client's risk for continued substance use and relapse. Therapist employed group interaction activities to build rapport among group members, promote sobriety, and emphasize relapse prevention. Therapist promoted safe nonjudgmental environment by providing group members with unconditional positive regard and encouraging group members to comply with group rules and guidelines. Therapist assisted group member with identifying and implementing healthier coping strategies.      Plan:  Continue [facility] IOP Phase I   Aftercare:  [facility]  Phase II  Program Assignments:  Personal recovery plan, relapse prevention plan, attendance of recovery support group meetings, exploration of sponsorship, drug/alcohol screens.     Stan Powell LCSW  3/2/2024  11:04 EST

## 2024-03-04 ENCOUNTER — OFFICE VISIT (OUTPATIENT)
Dept: PSYCHIATRY | Facility: HOSPITAL | Age: 29
End: 2024-03-04
Payer: COMMERCIAL

## 2024-03-04 DIAGNOSIS — F10.20 ALCOHOL USE DISORDER, SEVERE, DEPENDENCE: Primary | ICD-10-CM

## 2024-03-04 PROCEDURE — H0015 ALCOHOL AND/OR DRUG SERVICES: HCPCS | Performed by: NURSE PRACTITIONER

## 2024-03-05 NOTE — PROGRESS NOTES
CD IOP GROUP     Date: 03/04/2024  Name: Paula Linares    Time In: 1800   Time Out: 2055     Number of participants: 13    IOP GROUP NOTE     Data: 3 hour IOP group therapy session (Check-ins, Coping Skills, Relapse Prevention)     Check Ins: Therapist continued facilitation of rapport building strategies between group members. Therapist asked that each patient check in with home life and recovery efforts and identify triggers, cravings, and high risk situations that arise between group sessions. Therapist provided empathy and support during group session.     Session Content/Coping Skills: [employer] student, attended session. Kaia, also attended for the first part of session. Introductions completed. End of group questions sheet provided and explained to group members. Check ins completed by group members. , Kaia, shared and discussed Just for Today reading. Clinician provided community resource guide, SHANNON resources, [facility] Therapy Resources sheet as well as [facility] Crisis and Helpline Resources sheet. Clinician discussed SHANNON resources available through [facility] and the [facility]. Participation March schedule provided. Members were assigned to smaller groups to explore resources provided and group members discussed which resources they found most helpful. Clinician passed around SMART Recovery workbook so that group members could explore topics discussed in SMART recovery meetings. End of day questions processed with group members.      Response: Patient attended class in person. Patient participated in completion of check in form. Patient on check in form answered no to question of “currently or since your last group meeting, have you had any suicidal thoughts or plan or intent to hurt yourself”, and patient also answered no on check in form to question of “currently or since your last group  meeting, have you had any homicidal thoughts or plan or intent to hurt others”.     Personal Assessment 0-10 Scale (0-none, 10-high)    Anxiety:  1   Depression:  0   Cravings: 0     Assessment:     ..  Lab on 02/27/2024   Component Date Value Ref Range Status    THC, Screen, Urine 02/27/2024 Positive (A)  Negative Final    Phencyclidine (PCP), Urine 02/27/2024 Negative  Negative Final    Cocaine Screen, Urine 02/27/2024 Negative  Negative Final    Methamphetamine, Ur 02/27/2024 Negative  Negative Final    Opiate Screen 02/27/2024 Negative  Negative Final    Amphetamine Screen, Urine 02/27/2024 Negative  Negative Final    Benzodiazepine Screen, Urine 02/27/2024 Negative  Negative Final    Tricyclic Antidepressants Screen 02/27/2024 Negative  Negative Final    Methadone Screen, Urine 02/27/2024 Negative  Negative Final    Barbiturates Screen, Urine 02/27/2024 Negative  Negative Final    Oxycodone Screen, Urine 02/27/2024 Negative  Negative Final    Buprenorphine, Screen, Urine 02/27/2024 Negative  Negative Final    PH 02/27/2024 6.5  4.5 - 9 Final    CREATININE 02/27/2024 270.5  20 - 300 mg/dL mg/dL Final    SPECIFIC GRAVITY 02/27/2024 1.027  1.003 - 1.035 Final    CODEINE 02/27/2024 Negative  50 ng/ml ng/ml Final    HYDROCODONE 02/27/2024 Negative  50 ng/ml ng/ml Final    NORHYDROCODONE 02/27/2024 Negative  50 ng/ml ng/ml Final    HYDROMORPHONE 02/27/2024 Negative  50 ng/ml ng/ml Final    MORPHINE 02/27/2024 Negative  50 ng/ml ng/ml Final    FENTANYL 02/27/2024 Negative  2 ng/ml ng/ml Final    NORFENTANYL 02/27/2024 Negative  10 ng/ml ng/ml Final    METHADONE 02/27/2024 Negative  25 ng/ml ng/ml Final    EDDP 02/27/2024 Negative  50 ng/ml ng/ml Final    OXYCODONE 02/27/2024 Negative  50 ng/ml ng/ml Final    NOROXYCODONE 02/27/2024 Negative  50 ng/ml ng/ml Final    OXYMORPHONE 02/27/2024 Negative  50 ng/ml ng/ml Final    TAPENTADOL 02/27/2024 Negative  50 ng/ml ng/ml Final    TRAMADOL 02/27/2024 Negative  50 ng/ml  ng/ml Final    BUPRENORPHINE 02/27/2024 Negative  7.5 ng/ml ng/ml Final    NORBUPRENORPHINE 02/27/2024 Negative  10 ng/ml ng/ml Final    AMOBARBITAL 02/27/2024 Negative  100 ng/ml ng/ml Final    BUTABARBITAL 02/27/2024 Negative  100 ng/ml ng/ml Final    BUTALBITAL 02/27/2024 Negative  100 ng/ml ng/ml Final    PHENOBARBITAL 02/27/2024 Negative  100 ng/ml ng/ml Final    SECOBARBITAL 02/27/2024 Negative  100 ng/ml ng/ml Final    ALPRAZOLAM 02/27/2024 Negative  50 ng/ml ng/ml Final    ALPHA-HYDROXYALPRAZOLAM 02/27/2024 Negative  50 ng/ml ng/ml Final    CLONAZEPAM 02/27/2024 Negative  50 ng/ml ng/ml Final    7- AMINOCLONAZEPAM 02/27/2024 Negative  50 ng/ml ng/ml Final    DIAZEPAM 02/27/2024 Negative  50 ng/ml ng/ml Final    NORDIAZEPAM 02/27/2024 Negative  50 ng/ml ng/ml Final    LORAZEPAM 02/27/2024 Negative  100 ng/ml ng/ml Final    MIDAZOLAM 02/27/2024 Negative  50 ng/ml ng/ml Final    ALPHA-HYDROXYMIDAZOLAM 02/27/2024 Negative  50 ng/ml ng/ml Final    OXAZEPAM 02/27/2024 Negative  50 ng/ml ng/ml Final    TEMAZEPAM 02/27/2024 Negative  50 ng/ml ng/ml Final    ETG 02/27/2024 Negative  200 ng/ml ng/ml Final    ETS 02/27/2024 Negative  50 ng/ml ng/ml Final    BENZOYLECGONINE 02/27/2024 Negative  100 ng/ml ng/ml Final    6-NELIA 02/27/2024 Negative  10 ng/ml ng/ml Final    MDMA 02/27/2024 Negative  100 ng/ml ng/ml Final    PCP 02/27/2024 Negative  20 ng/ml ng/ml Final    DELTA-9-THC 02/27/2024 62 (C)  20 ng/ml ng/ml Final    AMPHETAMINE 02/27/2024 Negative  250 ng/ml ng/ml Final    METHAMPHETAMINE 02/27/2024 Negative  100 ng/ml ng/ml Final    METHYLPHENIDATE 02/27/2024 Negative  10 ng/ml ng/ml Final    PHENTERMINE 02/27/2024 Negative  100 ng/ml ng/ml Final    RITALINIC ACID 02/27/2024 Negative  50 ng/ml ng/ml Final    CARISOPRODOL 02/27/2024 Negative  100 ng/ml ng/ml Final    GABAPENTIN 02/27/2024 Negative  500 ng/ml ng/ml Final    PREGABALIN 02/27/2024 Negative  250 ng/ml ng/ml Final    ZOLPIDEM 02/27/2024 Negative  2  ng/ml ng/ml Final    CARBOXYZOLPIDEM 02/27/2024 Negative  10 ng/ml ng/ml Final       Mental Status Exam  Hygiene:  good  Dress: casual  Attitude: cooperative and agreeable   Motor Activity: appropriate  Eye Contact:  good  Speech: regular rate and rhythm   Mood:  calm and cooperative  Affect:  Appropriate  Thought Processes:  Linear  Thought Content:  Normal  Suicidal Thoughts:  denies  Homicidal Thoughts:  denies  Crisis Safety Plan: Safety plan has been discussed.   Hallucinations:  Unknown to clinician.   Reliability: fair  Insight: fair  Judgement: fair  Impulse Control: fair    Recovery/spiritual support group attendance: No.     Progress toward goal: Not at goal    Prognosis: Fair with Ongoing Treatment     Self-reported number of days sober: Patient on check in form reported Alc- 99 days- [place] 34 days.     Patient will contact this office, call 911 or present to the nearest emergency room should suicidal or homicidal ideations occur.    Impression/Formulation:    ICD-10-CM ICD-9-CM   1. Alcohol use disorder, severe, dependence  F10.20 303.90       Clinical Maneuvering/Interventions: Therapist utilized a person-centered approach to build rapport with group member. Therapist implemented motivational interviewing techniques to assist client with exploring and resolving ambivalence associated with commitment to change behaviors related to substance use and addiction. Therapist applied cognitive behavioral strategies to facilitate identification of maladaptive patterns of thinking and behavior that contribute to client's risk for continued substance use and relapse. Therapist employed group interaction activities to build rapport among group members, promote sobriety, and emphasize relapse prevention. Therapist promoted safe nonjudgmental environment by providing group members with unconditional positive regard and encouraging group members to comply with group rules and guidelines. Therapist assisted group member  with identifying and implementing healthier coping strategies.      Plan:  Continue [facility] IOP Phase I   Aftercare:  [facility] Phase II  Program Assignments:  Personal recovery plan, relapse prevention plan, attendance of recovery support group meetings, exploration of sponsorship, drug/alcohol screens.     Stan Powell LCSW  3/5/2024  18:48 EST

## 2024-03-06 ENCOUNTER — OFFICE VISIT (OUTPATIENT)
Dept: PSYCHIATRY | Facility: HOSPITAL | Age: 29
End: 2024-03-06
Payer: COMMERCIAL

## 2024-03-06 ENCOUNTER — LAB (OUTPATIENT)
Dept: LAB | Facility: HOSPITAL | Age: 29
End: 2024-03-06
Payer: COMMERCIAL

## 2024-03-06 DIAGNOSIS — F10.20 ALCOHOL USE DISORDER, SEVERE, DEPENDENCE: Primary | ICD-10-CM

## 2024-03-06 DIAGNOSIS — F10.20 ALCOHOL USE DISORDER, SEVERE, DEPENDENCE: ICD-10-CM

## 2024-03-06 LAB
AMPHET+METHAMPHET UR QL: NEGATIVE
AMPHETAMINES UR QL: NEGATIVE
BARBITURATES UR QL SCN: NEGATIVE
BENZODIAZ UR QL SCN: NEGATIVE
BUPRENORPHINE SERPL-MCNC: NEGATIVE NG/ML
CANNABINOIDS SERPL QL: NEGATIVE
COCAINE UR QL: NEGATIVE
METHADONE UR QL SCN: NEGATIVE
OPIATES UR QL: NEGATIVE
OXYCODONE UR QL SCN: NEGATIVE
PCP UR QL SCN: NEGATIVE
TRICYCLICS UR QL SCN: NEGATIVE

## 2024-03-06 PROCEDURE — 80306 DRUG TEST PRSMV INSTRMNT: CPT

## 2024-03-06 PROCEDURE — H0015 ALCOHOL AND/OR DRUG SERVICES: HCPCS | Performed by: NURSE PRACTITIONER

## 2024-03-07 ENCOUNTER — OFFICE VISIT (OUTPATIENT)
Dept: PSYCHIATRY | Facility: HOSPITAL | Age: 29
End: 2024-03-07
Payer: COMMERCIAL

## 2024-03-07 DIAGNOSIS — F10.20 ALCOHOL USE DISORDER, SEVERE, DEPENDENCE: Primary | ICD-10-CM

## 2024-03-07 PROCEDURE — H0015 ALCOHOL AND/OR DRUG SERVICES: HCPCS | Performed by: NURSE PRACTITIONER

## 2024-03-10 NOTE — PROGRESS NOTES
[facility] IOP GROUP     Date: 03/06/2024  Name: Paula Linares    Time In: 1800   Time Out: 2045     Number of participants: 13    IOP GROUP NOTE     Data: 3 hour IOP group therapy session (Check-ins, Coping Skills, Relapse Prevention)     Check Ins: Therapist continued facilitation of rapport building strategies between group members. Therapist asked that each patient check in with home life and recovery efforts and identify triggers, cravings, and high risk situations that arise between group sessions. Therapist provided empathy and support during group session.     Session Content/Coping Skills: [employer] student, attended for first part of session. , Kaia, and Juan, [facility] Liaison attended for the first part of session. Romario with pharmacy presented on Narcan education. Just for Today reading was presented and discussed. Living in Balance Session One, Part One began. Clinician facilitated discussion with group regarding what is addiction, if they felt addiction was a disease, and factors that contribute to addiction.     Response: Patient attended class in person. Patient participated in completion of check in form. Patient on check in form answered no to question of “currently or since your last group meeting, have you had any suicidal thoughts or plan or intent to hurt yourself”, and patient also answered no on check in form to question of “currently or since your last group meeting, have you had any homicidal thoughts or plan or intent to hurt others”.     Personal Assessment 0-10 Scale (0-none, 10-high)    Anxiety:  0   Depression:  0   Cravings: 0     Assessment:     ..  Lab on 03/06/2024   Component Date Value Ref Range Status    THC, Screen, Urine 03/06/2024 Negative  Negative Final    Phencyclidine (PCP), Urine 03/06/2024 Negative  Negative Final    Cocaine Screen, Urine 03/06/2024 Negative  Negative Final    Methamphetamine, Ur 03/06/2024 Negative   Negative Final    Opiate Screen 03/06/2024 Negative  Negative Final    Amphetamine Screen, Urine 03/06/2024 Negative  Negative Final    Benzodiazepine Screen, Urine 03/06/2024 Negative  Negative Final    Tricyclic Antidepressants Screen 03/06/2024 Negative  Negative Final    Methadone Screen, Urine 03/06/2024 Negative  Negative Final    Barbiturates Screen, Urine 03/06/2024 Negative  Negative Final    Oxycodone Screen, Urine 03/06/2024 Negative  Negative Final    Buprenorphine, Screen, Urine 03/06/2024 Negative  Negative Final   Lab on 02/27/2024   Component Date Value Ref Range Status    THC, Screen, Urine 02/27/2024 Positive (A)  Negative Final    Phencyclidine (PCP), Urine 02/27/2024 Negative  Negative Final    Cocaine Screen, Urine 02/27/2024 Negative  Negative Final    Methamphetamine, Ur 02/27/2024 Negative  Negative Final    Opiate Screen 02/27/2024 Negative  Negative Final    Amphetamine Screen, Urine 02/27/2024 Negative  Negative Final    Benzodiazepine Screen, Urine 02/27/2024 Negative  Negative Final    Tricyclic Antidepressants Screen 02/27/2024 Negative  Negative Final    Methadone Screen, Urine 02/27/2024 Negative  Negative Final    Barbiturates Screen, Urine 02/27/2024 Negative  Negative Final    Oxycodone Screen, Urine 02/27/2024 Negative  Negative Final    Buprenorphine, Screen, Urine 02/27/2024 Negative  Negative Final    PH 02/27/2024 6.5  4.5 - 9 Final    CREATININE 02/27/2024 270.5  20 - 300 mg/dL mg/dL Final    SPECIFIC GRAVITY 02/27/2024 1.027  1.003 - 1.035 Final    CODEINE 02/27/2024 Negative  50 ng/ml ng/ml Final    HYDROCODONE 02/27/2024 Negative  50 ng/ml ng/ml Final    NORHYDROCODONE 02/27/2024 Negative  50 ng/ml ng/ml Final    HYDROMORPHONE 02/27/2024 Negative  50 ng/ml ng/ml Final    MORPHINE 02/27/2024 Negative  50 ng/ml ng/ml Final    FENTANYL 02/27/2024 Negative  2 ng/ml ng/ml Final    NORFENTANYL 02/27/2024 Negative  10 ng/ml ng/ml Final    METHADONE 02/27/2024 Negative  25 ng/ml  ng/ml Final    EDDP 02/27/2024 Negative  50 ng/ml ng/ml Final    OXYCODONE 02/27/2024 Negative  50 ng/ml ng/ml Final    NOROXYCODONE 02/27/2024 Negative  50 ng/ml ng/ml Final    OXYMORPHONE 02/27/2024 Negative  50 ng/ml ng/ml Final    TAPENTADOL 02/27/2024 Negative  50 ng/ml ng/ml Final    TRAMADOL 02/27/2024 Negative  50 ng/ml ng/ml Final    BUPRENORPHINE 02/27/2024 Negative  7.5 ng/ml ng/ml Final    NORBUPRENORPHINE 02/27/2024 Negative  10 ng/ml ng/ml Final    AMOBARBITAL 02/27/2024 Negative  100 ng/ml ng/ml Final    BUTABARBITAL 02/27/2024 Negative  100 ng/ml ng/ml Final    BUTALBITAL 02/27/2024 Negative  100 ng/ml ng/ml Final    PHENOBARBITAL 02/27/2024 Negative  100 ng/ml ng/ml Final    SECOBARBITAL 02/27/2024 Negative  100 ng/ml ng/ml Final    ALPRAZOLAM 02/27/2024 Negative  50 ng/ml ng/ml Final    ALPHA-HYDROXYALPRAZOLAM 02/27/2024 Negative  50 ng/ml ng/ml Final    CLONAZEPAM 02/27/2024 Negative  50 ng/ml ng/ml Final    7- AMINOCLONAZEPAM 02/27/2024 Negative  50 ng/ml ng/ml Final    DIAZEPAM 02/27/2024 Negative  50 ng/ml ng/ml Final    NORDIAZEPAM 02/27/2024 Negative  50 ng/ml ng/ml Final    LORAZEPAM 02/27/2024 Negative  100 ng/ml ng/ml Final    MIDAZOLAM 02/27/2024 Negative  50 ng/ml ng/ml Final    ALPHA-HYDROXYMIDAZOLAM 02/27/2024 Negative  50 ng/ml ng/ml Final    OXAZEPAM 02/27/2024 Negative  50 ng/ml ng/ml Final    TEMAZEPAM 02/27/2024 Negative  50 ng/ml ng/ml Final    ETG 02/27/2024 Negative  200 ng/ml ng/ml Final    ETS 02/27/2024 Negative  50 ng/ml ng/ml Final    BENZOYLECGONINE 02/27/2024 Negative  100 ng/ml ng/ml Final    6-NELIA 02/27/2024 Negative  10 ng/ml ng/ml Final    MDMA 02/27/2024 Negative  100 ng/ml ng/ml Final    PCP 02/27/2024 Negative  20 ng/ml ng/ml Final    DELTA-9-THC 02/27/2024 62 (C)  20 ng/ml ng/ml Final    AMPHETAMINE 02/27/2024 Negative  250 ng/ml ng/ml Final    METHAMPHETAMINE 02/27/2024 Negative  100 ng/ml ng/ml Final    METHYLPHENIDATE 02/27/2024 Negative  10 ng/ml ng/ml Final     PHENTERMINE 02/27/2024 Negative  100 ng/ml ng/ml Final    RITALINIC ACID 02/27/2024 Negative  50 ng/ml ng/ml Final    CARISOPRODOL 02/27/2024 Negative  100 ng/ml ng/ml Final    GABAPENTIN 02/27/2024 Negative  500 ng/ml ng/ml Final    PREGABALIN 02/27/2024 Negative  250 ng/ml ng/ml Final    ZOLPIDEM 02/27/2024 Negative  2 ng/ml ng/ml Final    CARBOXYZOLPIDEM 02/27/2024 Negative  10 ng/ml ng/ml Final       Mental Status Exam  Hygiene:  good  Dress: casual  Attitude: cooperative and agreeable   Motor Activity: appropriate  Eye Contact:  good  Speech: regular rate and rhythm   Mood:  calm and cooperative  Affect:  Appropriate  Thought Processes:  Linear  Thought Content:  Normal  Suicidal Thoughts:  denies  Homicidal Thoughts:  denies  Crisis Safety Plan: Safety plan has been discussed.   Hallucinations:  Unknown to clinician.   Reliability: fair  Insight: fair  Judgement: fair  Impulse Control: fair    Recovery/spiritual support group attendance: No.     Progress toward goal: Not at goal    Prognosis: Fair with Ongoing Treatment     Self-reported number of days sober: Patient on check in form reported Alc-108/[place] 37.    Patient will contact this office, call 911 or present to the nearest emergency room should suicidal or homicidal ideations occur.    Impression/Formulation:    ICD-10-CM ICD-9-CM   1. Alcohol use disorder, severe, dependence  F10.20 303.90       Clinical Maneuvering/Interventions: Therapist utilized a person-centered approach to build rapport with group member. Therapist implemented motivational interviewing techniques to assist client with exploring and resolving ambivalence associated with commitment to change behaviors related to substance use and addiction. Therapist applied cognitive behavioral strategies to facilitate identification of maladaptive patterns of thinking and behavior that contribute to client's risk for continued substance use and relapse. Therapist employed group interaction  activities to build rapport among group members, promote sobriety, and emphasize relapse prevention. Therapist promoted safe nonjudgmental environment by providing group members with unconditional positive regard and encouraging group members to comply with group rules and guidelines. Therapist assisted group member with identifying and implementing healthier coping strategies.      Plan:  Continue [facility] IOP Phase I   Aftercare:  [facility] Phase II  Program Assignments:  Personal recovery plan, relapse prevention plan, attendance of recovery support group meetings, exploration of sponsorship, drug/alcohol screens.     Stan Powell LCSW  3/10/2024  15:18 EDT

## 2024-03-11 ENCOUNTER — OFFICE VISIT (OUTPATIENT)
Dept: PSYCHIATRY | Facility: HOSPITAL | Age: 29
End: 2024-03-11
Payer: COMMERCIAL

## 2024-03-11 DIAGNOSIS — F10.20 ALCOHOL USE DISORDER, SEVERE, DEPENDENCE: Primary | ICD-10-CM

## 2024-03-11 LAB
1OH-MIDAZOLAM UR CFM-MCNC: NEGATIVE NG/ML
6MAM UR CFM-MCNC: NEGATIVE NG/ML
7AMINOCLONAZEPAM UR CFM-MCNC: NEGATIVE NG/ML
7AMINOCLONAZEPAM UR CFM-MCNC: NEGATIVE NG/ML
A-OH ALPRAZ UR CFM-MCNC: NEGATIVE NG/ML
ALPRAZ UR CFM-MCNC: NEGATIVE NG/ML
AMOBARBITAL UR CFM-MCNC: NEGATIVE NG/ML
AMPHET UR CFM-MCNC: NEGATIVE NG/ML
BUPRENORPHINE UR-MCNC: NEGATIVE NG/ML
BUTABARBITAL UR CFM-MCNC: NEGATIVE NG/ML
BUTALBITAL UR CFM-MCNC: NEGATIVE NG/ML
BZE UR CFM-MCNC: NEGATIVE NG/ML
CARBOXYTHC UR CFM-MCNC: NEGATIVE NG/ML
CARISOPRODOL UR CFM-MCNC: NEGATIVE NG/ML
CODEINE UR CFM-MCNC: NEGATIVE NG/ML
CREATININE: 20.2 MG/DL
DIAZEPAM UR CFM-MCNC: NEGATIVE NG/ML
EDDP UR CFM-MCNC: NEGATIVE NG/ML
ETHYL GLUCURONIDE UR CFM-MCNC: NEGATIVE NG/ML
ETS: NEGATIVE NG/ML
FENTANYL UR-MCNC: NEGATIVE NG/ML
GABAPENTIN UR CFM-MCNC: NEGATIVE NG/ML
HYDROCODONE UR CFM-MCNC: NEGATIVE NG/ML
HYDROMORPHONE UR CFM-MCNC: NEGATIVE NG/ML
LORAZEPAM UR CFM-MCNC: NEGATIVE NG/ML
MDMA UR CFM-MCNC: NEGATIVE NG/ML
ME-PHENIDATE UR CFM-MCNC: NEGATIVE NG/ML
METHADONE UR CFM-MCNC: NEGATIVE NG/ML
METHAMPHET UR CFM-MCNC: NEGATIVE NG/ML
MIDAZOLAM UR CFM-MCNC: NEGATIVE NG/ML
MORPHINE UR CFM-MCNC: NEGATIVE NG/ML
NORBUPRENORPHINE UR CFM-MCNC: NEGATIVE NG/ML
NORDIAZEPAM UR CFM-MCNC: NEGATIVE NG/ML
NORFENTANYL UR CFM-MCNC: NEGATIVE NG/ML
NORHYDROCODONE UR CFM-MCNC: NEGATIVE NG/ML
NOROXYCODONE UR CFM-MCNC: NEGATIVE NG/ML
OXAZEPAM CTO UR CFM-MCNC: NEGATIVE NG/ML
OXYCODONE SERPL-MCNC: NEGATIVE NG/ML
OXYMORPHONE SERPL-MCNC: NEGATIVE NG/ML
PCP UR CFM-MCNC: NEGATIVE NG/ML
PH: 7.6 (ref 4.5–9)
PHENOBARB UR CFM-MCNC: NEGATIVE NG/ML
PHENTERMINE: NEGATIVE NG/ML
PPAA UR CFM-MCNC: NEGATIVE NG/ML
PREGABALIN UR CFM-MCNC: NEGATIVE NG/ML
SECOBARBITAL UR CFM-MCNC: NEGATIVE NG/ML
SPECIFIC GRAVITY: 1 (ref 1–1.03)
TAPENTADOL UR CFM-MCNC: NEGATIVE NG/ML
TEMAZEPAM UR CFM-MCNC: NEGATIVE NG/ML
TRAMADOL: NEGATIVE NG/ML
ZOLPIDEM PHENYL-4-CARB UR CFM-MCNC: NEGATIVE NG/ML
ZOLPIDEM UR CFM-MCNC: NEGATIVE NG/ML

## 2024-03-11 PROCEDURE — H0015 ALCOHOL AND/OR DRUG SERVICES: HCPCS | Performed by: NURSE PRACTITIONER

## 2024-03-11 NOTE — PROGRESS NOTES
CD IOP GROUP     Date: 03/07/2024  Name: Paula Linares    Time In: 1801   Time Out: 2055     Number of participants: 10    IOP GROUP NOTE     Data: 3 hour IOP group therapy session (Check-ins, Coping Skills, Relapse Prevention)     Check Ins: Therapist continued facilitation of rapport building strategies between group members. Therapist asked that each patient check in with home life and recovery efforts and identify triggers, cravings, and high risk situations that arise between group sessions. Therapist provided empathy and support during group session.     Session Content/Coping Skills: Kaia,  and Juan [facility]  attended for first part of session. Check ins completed by group members. Relapse prevention plans developed and discussed by group members. Group members were asked to bring their relapse prevention plans back next class to review how they implemented their plan over the weekend. Aime [facility], attended for second part of class. Aime shared a quote to group members. Clinician facilitated discussion regarding self-sabotage and explored how these could be rooted in beliefs. Living in Balance- Session one- part one continued. YouTube video “The Neurobiology of Addiction 101 in [facility]” viewed and discussed ([facility] channel).     Response: Patient attended class in person. Patient participated in completion of check in form. Patient on check in form answered no to question of “currently or since your last group meeting, have you had any suicidal thoughts or plan or intent to hurt yourself”, and patient also answered no on check in form to question of “currently or since your last group meeting, have you had any homicidal thoughts or plan or intent to hurt others”.     Personal Assessment 0-10 Scale (0-none, 10-high)    Anxiety:  0   Depression:  0   Cravings: 0     Assessment:     ..  Lab on 03/06/2024    Component Date Value Ref Range Status    THC, Screen, Urine 03/06/2024 Negative  Negative Final    Phencyclidine (PCP), Urine 03/06/2024 Negative  Negative Final    Cocaine Screen, Urine 03/06/2024 Negative  Negative Final    Methamphetamine, Ur 03/06/2024 Negative  Negative Final    Opiate Screen 03/06/2024 Negative  Negative Final    Amphetamine Screen, Urine 03/06/2024 Negative  Negative Final    Benzodiazepine Screen, Urine 03/06/2024 Negative  Negative Final    Tricyclic Antidepressants Screen 03/06/2024 Negative  Negative Final    Methadone Screen, Urine 03/06/2024 Negative  Negative Final    Barbiturates Screen, Urine 03/06/2024 Negative  Negative Final    Oxycodone Screen, Urine 03/06/2024 Negative  Negative Final    Buprenorphine, Screen, Urine 03/06/2024 Negative  Negative Final    PH 03/06/2024 7.6  4.5 - 9 Final    CREATININE 03/06/2024 20.2  20 - 300 mg/dL mg/dL Final    SPECIFIC GRAVITY 03/06/2024 1.003  1.003 - 1.035 Final    CODEINE 03/06/2024 Negative  50 ng/ml ng/ml Final    HYDROCODONE 03/06/2024 Negative  50 ng/ml ng/ml Final    NORHYDROCODONE 03/06/2024 Negative  50 ng/ml ng/ml Final    HYDROMORPHONE 03/06/2024 Negative  50 ng/ml ng/ml Final    MORPHINE 03/06/2024 Negative  50 ng/ml ng/ml Final    FENTANYL 03/06/2024 Negative  2 ng/ml ng/ml Final    NORFENTANYL 03/06/2024 Negative  10 ng/ml ng/ml Final    METHADONE 03/06/2024 Negative  25 ng/ml ng/ml Final    EDDP 03/06/2024 Negative  50 ng/ml ng/ml Final    OXYCODONE 03/06/2024 Negative  50 ng/ml ng/ml Final    NOROXYCODONE 03/06/2024 Negative  50 ng/ml ng/ml Final    OXYMORPHONE 03/06/2024 Negative  50 ng/ml ng/ml Final    TAPENTADOL 03/06/2024 Negative  50 ng/ml ng/ml Final    TRAMADOL 03/06/2024 Negative  50 ng/ml ng/ml Final    BUPRENORPHINE 03/06/2024 Negative  7.5 ng/ml ng/ml Final    NORBUPRENORPHINE 03/06/2024 Negative  10 ng/ml ng/ml Final    AMOBARBITAL 03/06/2024 Negative  100 ng/ml ng/ml Final    BUTABARBITAL 03/06/2024 Negative   100 ng/ml ng/ml Final    BUTALBITAL 03/06/2024 Negative  100 ng/ml ng/ml Final    PHENOBARBITAL 03/06/2024 Negative  100 ng/ml ng/ml Final    SECOBARBITAL 03/06/2024 Negative  100 ng/ml ng/ml Final    ALPRAZOLAM 03/06/2024 Negative  50 ng/ml ng/ml Final    ALPHA-HYDROXYALPRAZOLAM 03/06/2024 Negative  50 ng/ml ng/ml Final    CLONAZEPAM 03/06/2024 Negative  50 ng/ml ng/ml Final    7- AMINOCLONAZEPAM 03/06/2024 Negative  50 ng/ml ng/ml Final    DIAZEPAM 03/06/2024 Negative  50 ng/ml ng/ml Final    NORDIAZEPAM 03/06/2024 Negative  50 ng/ml ng/ml Final    LORAZEPAM 03/06/2024 Negative  100 ng/ml ng/ml Final    MIDAZOLAM 03/06/2024 Negative  50 ng/ml ng/ml Final    ALPHA-HYDROXYMIDAZOLAM 03/06/2024 Negative  50 ng/ml ng/ml Final    OXAZEPAM 03/06/2024 Negative  50 ng/ml ng/ml Final    TEMAZEPAM 03/06/2024 Negative  50 ng/ml ng/ml Final    ETG 03/06/2024 Negative  200 ng/ml ng/ml Final    ETS 03/06/2024 Negative  50 ng/ml ng/ml Final    BENZOYLECGONINE 03/06/2024 Negative  100 ng/ml ng/ml Final    6-NELIA 03/06/2024 Negative  10 ng/ml ng/ml Final    MDMA 03/06/2024 Negative  100 ng/ml ng/ml Final    PCP 03/06/2024 Negative  20 ng/ml ng/ml Final    DELTA-9-THC 03/06/2024 Negative  20 ng/ml ng/ml Final    AMPHETAMINE 03/06/2024 Negative  250 ng/ml ng/ml Final    METHAMPHETAMINE 03/06/2024 Negative  100 ng/ml ng/ml Final    METHYLPHENIDATE 03/06/2024 Negative  10 ng/ml ng/ml Final    PHENTERMINE 03/06/2024 Negative  100 ng/ml ng/ml Final    RITALINIC ACID 03/06/2024 Negative  50 ng/ml ng/ml Final    CARISOPRODOL 03/06/2024 Negative  100 ng/ml ng/ml Final    GABAPENTIN 03/06/2024 Negative  500 ng/ml ng/ml Final    PREGABALIN 03/06/2024 Negative  250 ng/ml ng/ml Final    ZOLPIDEM 03/06/2024 Negative  2 ng/ml ng/ml Final    CARBOXYZOLPIDEM 03/06/2024 Negative  10 ng/ml ng/ml Final   Lab on 02/27/2024   Component Date Value Ref Range Status    THC, Screen, Urine 02/27/2024 Positive (A)  Negative Final    Phencyclidine (PCP), Urine  02/27/2024 Negative  Negative Final    Cocaine Screen, Urine 02/27/2024 Negative  Negative Final    Methamphetamine, Ur 02/27/2024 Negative  Negative Final    Opiate Screen 02/27/2024 Negative  Negative Final    Amphetamine Screen, Urine 02/27/2024 Negative  Negative Final    Benzodiazepine Screen, Urine 02/27/2024 Negative  Negative Final    Tricyclic Antidepressants Screen 02/27/2024 Negative  Negative Final    Methadone Screen, Urine 02/27/2024 Negative  Negative Final    Barbiturates Screen, Urine 02/27/2024 Negative  Negative Final    Oxycodone Screen, Urine 02/27/2024 Negative  Negative Final    Buprenorphine, Screen, Urine 02/27/2024 Negative  Negative Final    PH 02/27/2024 6.5  4.5 - 9 Final    CREATININE 02/27/2024 270.5  20 - 300 mg/dL mg/dL Final    SPECIFIC GRAVITY 02/27/2024 1.027  1.003 - 1.035 Final    CODEINE 02/27/2024 Negative  50 ng/ml ng/ml Final    HYDROCODONE 02/27/2024 Negative  50 ng/ml ng/ml Final    NORHYDROCODONE 02/27/2024 Negative  50 ng/ml ng/ml Final    HYDROMORPHONE 02/27/2024 Negative  50 ng/ml ng/ml Final    MORPHINE 02/27/2024 Negative  50 ng/ml ng/ml Final    FENTANYL 02/27/2024 Negative  2 ng/ml ng/ml Final    NORFENTANYL 02/27/2024 Negative  10 ng/ml ng/ml Final    METHADONE 02/27/2024 Negative  25 ng/ml ng/ml Final    EDDP 02/27/2024 Negative  50 ng/ml ng/ml Final    OXYCODONE 02/27/2024 Negative  50 ng/ml ng/ml Final    NOROXYCODONE 02/27/2024 Negative  50 ng/ml ng/ml Final    OXYMORPHONE 02/27/2024 Negative  50 ng/ml ng/ml Final    TAPENTADOL 02/27/2024 Negative  50 ng/ml ng/ml Final    TRAMADOL 02/27/2024 Negative  50 ng/ml ng/ml Final    BUPRENORPHINE 02/27/2024 Negative  7.5 ng/ml ng/ml Final    NORBUPRENORPHINE 02/27/2024 Negative  10 ng/ml ng/ml Final    AMOBARBITAL 02/27/2024 Negative  100 ng/ml ng/ml Final    BUTABARBITAL 02/27/2024 Negative  100 ng/ml ng/ml Final    BUTALBITAL 02/27/2024 Negative  100 ng/ml ng/ml Final    PHENOBARBITAL 02/27/2024 Negative  100 ng/ml  ng/ml Final    SECOBARBITAL 02/27/2024 Negative  100 ng/ml ng/ml Final    ALPRAZOLAM 02/27/2024 Negative  50 ng/ml ng/ml Final    ALPHA-HYDROXYALPRAZOLAM 02/27/2024 Negative  50 ng/ml ng/ml Final    CLONAZEPAM 02/27/2024 Negative  50 ng/ml ng/ml Final    7- AMINOCLONAZEPAM 02/27/2024 Negative  50 ng/ml ng/ml Final    DIAZEPAM 02/27/2024 Negative  50 ng/ml ng/ml Final    NORDIAZEPAM 02/27/2024 Negative  50 ng/ml ng/ml Final    LORAZEPAM 02/27/2024 Negative  100 ng/ml ng/ml Final    MIDAZOLAM 02/27/2024 Negative  50 ng/ml ng/ml Final    ALPHA-HYDROXYMIDAZOLAM 02/27/2024 Negative  50 ng/ml ng/ml Final    OXAZEPAM 02/27/2024 Negative  50 ng/ml ng/ml Final    TEMAZEPAM 02/27/2024 Negative  50 ng/ml ng/ml Final    ETG 02/27/2024 Negative  200 ng/ml ng/ml Final    ETS 02/27/2024 Negative  50 ng/ml ng/ml Final    BENZOYLECGONINE 02/27/2024 Negative  100 ng/ml ng/ml Final    6-NELIA 02/27/2024 Negative  10 ng/ml ng/ml Final    MDMA 02/27/2024 Negative  100 ng/ml ng/ml Final    PCP 02/27/2024 Negative  20 ng/ml ng/ml Final    DELTA-9-THC 02/27/2024 62 (C)  20 ng/ml ng/ml Final    AMPHETAMINE 02/27/2024 Negative  250 ng/ml ng/ml Final    METHAMPHETAMINE 02/27/2024 Negative  100 ng/ml ng/ml Final    METHYLPHENIDATE 02/27/2024 Negative  10 ng/ml ng/ml Final    PHENTERMINE 02/27/2024 Negative  100 ng/ml ng/ml Final    RITALINIC ACID 02/27/2024 Negative  50 ng/ml ng/ml Final    CARISOPRODOL 02/27/2024 Negative  100 ng/ml ng/ml Final    GABAPENTIN 02/27/2024 Negative  500 ng/ml ng/ml Final    PREGABALIN 02/27/2024 Negative  250 ng/ml ng/ml Final    ZOLPIDEM 02/27/2024 Negative  2 ng/ml ng/ml Final    CARBOXYZOLPIDEM 02/27/2024 Negative  10 ng/ml ng/ml Final       Mental Status Exam  Hygiene:  good  Dress: casual  Attitude: cooperative and agreeable   Motor Activity: appropriate  Eye Contact:  good  Speech: regular rate and rhythm   Mood:  calm and cooperative  Affect:  Appropriate  Thought Processes:  Linear  Thought Content:   Normal  Suicidal Thoughts:  denies  Homicidal Thoughts:  denies  Crisis Safety Plan: Safety plan has been discussed.   Hallucinations:  Unknown to clinician.   Reliability: fair  Insight: fair  Judgement: fair  Impulse Control: fair    Recovery/spiritual support group attendance: No.     Progress toward goal: Not at goal    Prognosis: Fair with Ongoing Treatment     Self-reported number of days sober: Patient on check in form reported Alc 103/ [place] 38.     Patient will contact this office, call 911 or present to the nearest emergency room should suicidal or homicidal ideations occur.    Impression/Formulation:    ICD-10-CM ICD-9-CM   1. Alcohol use disorder, severe, dependence  F10.20 303.90       Clinical Maneuvering/Interventions: Therapist utilized a person-centered approach to build rapport with group member. Therapist implemented motivational interviewing techniques to assist client with exploring and resolving ambivalence associated with commitment to change behaviors related to substance use and addiction. Therapist applied cognitive behavioral strategies to facilitate identification of maladaptive patterns of thinking and behavior that contribute to client's risk for continued substance use and relapse. Therapist employed group interaction activities to build rapport among group members, promote sobriety, and emphasize relapse prevention. Therapist promoted safe nonjudgmental environment by providing group members with unconditional positive regard and encouraging group members to comply with group rules and guidelines. Therapist assisted group member with identifying and implementing healthier coping strategies.      Plan:  Continue [facility] IOP Phase I   Aftercare:  [facility] Phase II  Program Assignments:  Personal recovery plan, relapse prevention plan, attendance of recovery support group meetings, exploration of sponsorship, drug/alcohol  screens.     Stan Powell, ELLIOTT  3/11/2024  17:01 EDT

## 2024-03-13 ENCOUNTER — LAB (OUTPATIENT)
Dept: LAB | Facility: HOSPITAL | Age: 29
End: 2024-03-13
Payer: COMMERCIAL

## 2024-03-13 ENCOUNTER — OFFICE VISIT (OUTPATIENT)
Dept: PSYCHIATRY | Facility: HOSPITAL | Age: 29
End: 2024-03-13
Payer: COMMERCIAL

## 2024-03-13 DIAGNOSIS — F10.20 ALCOHOL USE DISORDER, SEVERE, DEPENDENCE: ICD-10-CM

## 2024-03-13 DIAGNOSIS — F10.20 ALCOHOL USE DISORDER, SEVERE, DEPENDENCE: Primary | ICD-10-CM

## 2024-03-13 PROCEDURE — 80306 DRUG TEST PRSMV INSTRMNT: CPT

## 2024-03-13 PROCEDURE — H0015 ALCOHOL AND/OR DRUG SERVICES: HCPCS | Performed by: NURSE PRACTITIONER

## 2024-03-13 NOTE — PROGRESS NOTES
CD IOP GROUP     Date: 03/11/2024  Name: Paula Linares    Time In: 1800   Time Out: 2043     Number of participants: 11    IOP GROUP NOTE     Data: 3 hour IOP group therapy session (Check-ins, Coping Skills, Relapse Prevention)     Check Ins: Therapist continued facilitation of rapport building strategies between group members. Therapist asked that each patient check in with home life and recovery efforts and identify triggers, cravings, and high risk situations that arise between group sessions. Therapist provided empathy and support during group session.     Session Content/Coping Skills: [employer] student, attended session. Juan [facility] Liaison also attended for first part of session. Check ins completed by group members. Clinician explained to group members the importance of attendance, completing UDS on the assigned day, and the need of negative results on UDS to complete CD-IOP. Group members who attended the previous class shared how they utilized their relapse prevention plans developed last class over the weekend. Living in Balance- Session One- Part 3 completed.     Response: Patient attended class in person. Patient participated in completion of check in form. Patient on check in form answered no to question of “currently or since your last group meeting, have you had any suicidal thoughts or plan or intent to hurt yourself”, and patient also answered no on check in form to question of “currently or since your last group meeting, have you had any homicidal thoughts or plan or intent to hurt others”.     Personal Assessment 0-10 Scale (0-none, 10-high)    Anxiety:  0   Depression:  0   Cravings: 0     Assessment:     ..  Lab on 03/06/2024   Component Date Value Ref Range Status    THC, Screen, Urine 03/06/2024 Negative  Negative Final    Phencyclidine (PCP), Urine 03/06/2024 Negative  Negative Final    Cocaine Screen, Urine 03/06/2024 Negative  Negative Final     Methamphetamine, Ur 03/06/2024 Negative  Negative Final    Opiate Screen 03/06/2024 Negative  Negative Final    Amphetamine Screen, Urine 03/06/2024 Negative  Negative Final    Benzodiazepine Screen, Urine 03/06/2024 Negative  Negative Final    Tricyclic Antidepressants Screen 03/06/2024 Negative  Negative Final    Methadone Screen, Urine 03/06/2024 Negative  Negative Final    Barbiturates Screen, Urine 03/06/2024 Negative  Negative Final    Oxycodone Screen, Urine 03/06/2024 Negative  Negative Final    Buprenorphine, Screen, Urine 03/06/2024 Negative  Negative Final    PH 03/06/2024 7.6  4.5 - 9 Final    CREATININE 03/06/2024 20.2  20 - 300 mg/dL mg/dL Final    SPECIFIC GRAVITY 03/06/2024 1.003  1.003 - 1.035 Final    CODEINE 03/06/2024 Negative  50 ng/ml ng/ml Final    HYDROCODONE 03/06/2024 Negative  50 ng/ml ng/ml Final    NORHYDROCODONE 03/06/2024 Negative  50 ng/ml ng/ml Final    HYDROMORPHONE 03/06/2024 Negative  50 ng/ml ng/ml Final    MORPHINE 03/06/2024 Negative  50 ng/ml ng/ml Final    FENTANYL 03/06/2024 Negative  2 ng/ml ng/ml Final    NORFENTANYL 03/06/2024 Negative  10 ng/ml ng/ml Final    METHADONE 03/06/2024 Negative  25 ng/ml ng/ml Final    EDDP 03/06/2024 Negative  50 ng/ml ng/ml Final    OXYCODONE 03/06/2024 Negative  50 ng/ml ng/ml Final    NOROXYCODONE 03/06/2024 Negative  50 ng/ml ng/ml Final    OXYMORPHONE 03/06/2024 Negative  50 ng/ml ng/ml Final    TAPENTADOL 03/06/2024 Negative  50 ng/ml ng/ml Final    TRAMADOL 03/06/2024 Negative  50 ng/ml ng/ml Final    BUPRENORPHINE 03/06/2024 Negative  7.5 ng/ml ng/ml Final    NORBUPRENORPHINE 03/06/2024 Negative  10 ng/ml ng/ml Final    AMOBARBITAL 03/06/2024 Negative  100 ng/ml ng/ml Final    BUTABARBITAL 03/06/2024 Negative  100 ng/ml ng/ml Final    BUTALBITAL 03/06/2024 Negative  100 ng/ml ng/ml Final    PHENOBARBITAL 03/06/2024 Negative  100 ng/ml ng/ml Final    SECOBARBITAL 03/06/2024 Negative  100 ng/ml ng/ml Final    ALPRAZOLAM 03/06/2024  Negative  50 ng/ml ng/ml Final    ALPHA-HYDROXYALPRAZOLAM 03/06/2024 Negative  50 ng/ml ng/ml Final    CLONAZEPAM 03/06/2024 Negative  50 ng/ml ng/ml Final    7- AMINOCLONAZEPAM 03/06/2024 Negative  50 ng/ml ng/ml Final    DIAZEPAM 03/06/2024 Negative  50 ng/ml ng/ml Final    NORDIAZEPAM 03/06/2024 Negative  50 ng/ml ng/ml Final    LORAZEPAM 03/06/2024 Negative  100 ng/ml ng/ml Final    MIDAZOLAM 03/06/2024 Negative  50 ng/ml ng/ml Final    ALPHA-HYDROXYMIDAZOLAM 03/06/2024 Negative  50 ng/ml ng/ml Final    OXAZEPAM 03/06/2024 Negative  50 ng/ml ng/ml Final    TEMAZEPAM 03/06/2024 Negative  50 ng/ml ng/ml Final    ETG 03/06/2024 Negative  200 ng/ml ng/ml Final    ETS 03/06/2024 Negative  50 ng/ml ng/ml Final    BENZOYLECGONINE 03/06/2024 Negative  100 ng/ml ng/ml Final    6-NELIA 03/06/2024 Negative  10 ng/ml ng/ml Final    MDMA 03/06/2024 Negative  100 ng/ml ng/ml Final    PCP 03/06/2024 Negative  20 ng/ml ng/ml Final    DELTA-9-THC 03/06/2024 Negative  20 ng/ml ng/ml Final    AMPHETAMINE 03/06/2024 Negative  250 ng/ml ng/ml Final    METHAMPHETAMINE 03/06/2024 Negative  100 ng/ml ng/ml Final    METHYLPHENIDATE 03/06/2024 Negative  10 ng/ml ng/ml Final    PHENTERMINE 03/06/2024 Negative  100 ng/ml ng/ml Final    RITALINIC ACID 03/06/2024 Negative  50 ng/ml ng/ml Final    CARISOPRODOL 03/06/2024 Negative  100 ng/ml ng/ml Final    GABAPENTIN 03/06/2024 Negative  500 ng/ml ng/ml Final    PREGABALIN 03/06/2024 Negative  250 ng/ml ng/ml Final    ZOLPIDEM 03/06/2024 Negative  2 ng/ml ng/ml Final    CARBOXYZOLPIDEM 03/06/2024 Negative  10 ng/ml ng/ml Final   Lab on 02/27/2024   Component Date Value Ref Range Status    THC, Screen, Urine 02/27/2024 Positive (A)  Negative Final    Phencyclidine (PCP), Urine 02/27/2024 Negative  Negative Final    Cocaine Screen, Urine 02/27/2024 Negative  Negative Final    Methamphetamine, Ur 02/27/2024 Negative  Negative Final    Opiate Screen 02/27/2024 Negative  Negative Final    Amphetamine  Screen, Urine 02/27/2024 Negative  Negative Final    Benzodiazepine Screen, Urine 02/27/2024 Negative  Negative Final    Tricyclic Antidepressants Screen 02/27/2024 Negative  Negative Final    Methadone Screen, Urine 02/27/2024 Negative  Negative Final    Barbiturates Screen, Urine 02/27/2024 Negative  Negative Final    Oxycodone Screen, Urine 02/27/2024 Negative  Negative Final    Buprenorphine, Screen, Urine 02/27/2024 Negative  Negative Final    PH 02/27/2024 6.5  4.5 - 9 Final    CREATININE 02/27/2024 270.5  20 - 300 mg/dL mg/dL Final    SPECIFIC GRAVITY 02/27/2024 1.027  1.003 - 1.035 Final    CODEINE 02/27/2024 Negative  50 ng/ml ng/ml Final    HYDROCODONE 02/27/2024 Negative  50 ng/ml ng/ml Final    NORHYDROCODONE 02/27/2024 Negative  50 ng/ml ng/ml Final    HYDROMORPHONE 02/27/2024 Negative  50 ng/ml ng/ml Final    MORPHINE 02/27/2024 Negative  50 ng/ml ng/ml Final    FENTANYL 02/27/2024 Negative  2 ng/ml ng/ml Final    NORFENTANYL 02/27/2024 Negative  10 ng/ml ng/ml Final    METHADONE 02/27/2024 Negative  25 ng/ml ng/ml Final    EDDP 02/27/2024 Negative  50 ng/ml ng/ml Final    OXYCODONE 02/27/2024 Negative  50 ng/ml ng/ml Final    NOROXYCODONE 02/27/2024 Negative  50 ng/ml ng/ml Final    OXYMORPHONE 02/27/2024 Negative  50 ng/ml ng/ml Final    TAPENTADOL 02/27/2024 Negative  50 ng/ml ng/ml Final    TRAMADOL 02/27/2024 Negative  50 ng/ml ng/ml Final    BUPRENORPHINE 02/27/2024 Negative  7.5 ng/ml ng/ml Final    NORBUPRENORPHINE 02/27/2024 Negative  10 ng/ml ng/ml Final    AMOBARBITAL 02/27/2024 Negative  100 ng/ml ng/ml Final    BUTABARBITAL 02/27/2024 Negative  100 ng/ml ng/ml Final    BUTALBITAL 02/27/2024 Negative  100 ng/ml ng/ml Final    PHENOBARBITAL 02/27/2024 Negative  100 ng/ml ng/ml Final    SECOBARBITAL 02/27/2024 Negative  100 ng/ml ng/ml Final    ALPRAZOLAM 02/27/2024 Negative  50 ng/ml ng/ml Final    ALPHA-HYDROXYALPRAZOLAM 02/27/2024 Negative  50 ng/ml ng/ml Final    CLONAZEPAM 02/27/2024  Negative  50 ng/ml ng/ml Final    7- AMINOCLONAZEPAM 02/27/2024 Negative  50 ng/ml ng/ml Final    DIAZEPAM 02/27/2024 Negative  50 ng/ml ng/ml Final    NORDIAZEPAM 02/27/2024 Negative  50 ng/ml ng/ml Final    LORAZEPAM 02/27/2024 Negative  100 ng/ml ng/ml Final    MIDAZOLAM 02/27/2024 Negative  50 ng/ml ng/ml Final    ALPHA-HYDROXYMIDAZOLAM 02/27/2024 Negative  50 ng/ml ng/ml Final    OXAZEPAM 02/27/2024 Negative  50 ng/ml ng/ml Final    TEMAZEPAM 02/27/2024 Negative  50 ng/ml ng/ml Final    ETG 02/27/2024 Negative  200 ng/ml ng/ml Final    ETS 02/27/2024 Negative  50 ng/ml ng/ml Final    BENZOYLECGONINE 02/27/2024 Negative  100 ng/ml ng/ml Final    6-NELIA 02/27/2024 Negative  10 ng/ml ng/ml Final    MDMA 02/27/2024 Negative  100 ng/ml ng/ml Final    PCP 02/27/2024 Negative  20 ng/ml ng/ml Final    DELTA-9-THC 02/27/2024 62 (C)  20 ng/ml ng/ml Final    AMPHETAMINE 02/27/2024 Negative  250 ng/ml ng/ml Final    METHAMPHETAMINE 02/27/2024 Negative  100 ng/ml ng/ml Final    METHYLPHENIDATE 02/27/2024 Negative  10 ng/ml ng/ml Final    PHENTERMINE 02/27/2024 Negative  100 ng/ml ng/ml Final    RITALINIC ACID 02/27/2024 Negative  50 ng/ml ng/ml Final    CARISOPRODOL 02/27/2024 Negative  100 ng/ml ng/ml Final    GABAPENTIN 02/27/2024 Negative  500 ng/ml ng/ml Final    PREGABALIN 02/27/2024 Negative  250 ng/ml ng/ml Final    ZOLPIDEM 02/27/2024 Negative  2 ng/ml ng/ml Final    CARBOXYZOLPIDEM 02/27/2024 Negative  10 ng/ml ng/ml Final       Mental Status Exam  Hygiene:  good  Dress: casual  Attitude: cooperative and agreeable   Motor Activity: appropriate  Eye Contact:  good  Speech: regular rate and rhythm   Mood:  calm and cooperative  Affect:  Appropriate  Thought Processes:  Linear  Thought Content:  Normal  Suicidal Thoughts:  denies  Homicidal Thoughts:  denies  Crisis Safety Plan: Safety plan has been discussed.   Hallucinations:  Unknown to clinician.   Reliability: fair  Insight: fair  Judgement: fair  Impulse Control:  fair    Recovery/spiritual support group attendance: No.     Progress toward goal: Not at goal    Prognosis: Fair with Ongoing Treatment     Self-reported number of days sober: Patient on check in form reported Alc 107- [place] 42.    Patient will contact this office, call 911 or present to the nearest emergency room should suicidal or homicidal ideations occur.    Impression/Formulation:    ICD-10-CM ICD-9-CM   1. Alcohol use disorder, severe, dependence  F10.20 303.90       Clinical Maneuvering/Interventions: Therapist utilized a person-centered approach to build rapport with group member. Therapist implemented motivational interviewing techniques to assist client with exploring and resolving ambivalence associated with commitment to change behaviors related to substance use and addiction. Therapist applied cognitive behavioral strategies to facilitate identification of maladaptive patterns of thinking and behavior that contribute to client's risk for continued substance use and relapse. Therapist employed group interaction activities to build rapport among group members, promote sobriety, and emphasize relapse prevention. Therapist promoted safe nonjudgmental environment by providing group members with unconditional positive regard and encouraging group members to comply with group rules and guidelines. Therapist assisted group member with identifying and implementing healthier coping strategies.      Plan:  Continue [facility] IOP Phase I   Aftercare:  [facility] Phase II  Program Assignments:  Personal recovery plan, relapse prevention plan, attendance of recovery support group meetings, exploration of sponsorship, drug/alcohol screens.     Stan Powell LCSW  3/13/2024  09:04 EDT

## 2024-03-14 ENCOUNTER — OFFICE VISIT (OUTPATIENT)
Dept: PSYCHIATRY | Facility: HOSPITAL | Age: 29
End: 2024-03-14
Payer: COMMERCIAL

## 2024-03-14 DIAGNOSIS — F10.20 ALCOHOL USE DISORDER, SEVERE, DEPENDENCE: Primary | ICD-10-CM

## 2024-03-14 PROCEDURE — H0015 ALCOHOL AND/OR DRUG SERVICES: HCPCS | Performed by: NURSE PRACTITIONER

## 2024-03-18 ENCOUNTER — OFFICE VISIT (OUTPATIENT)
Dept: PSYCHIATRY | Facility: HOSPITAL | Age: 29
End: 2024-03-18
Payer: COMMERCIAL

## 2024-03-18 DIAGNOSIS — F10.20 ALCOHOL USE DISORDER, SEVERE, DEPENDENCE: Primary | ICD-10-CM

## 2024-03-18 PROCEDURE — H0015 ALCOHOL AND/OR DRUG SERVICES: HCPCS | Performed by: NURSE PRACTITIONER

## 2024-03-19 NOTE — PROGRESS NOTES
[facility] IOP GROUP     Date: 03/13/2024  Name: Paula Linares    Time In: 1800   Time Out: Approximately 2048      Number of participants: 12    IOP GROUP NOTE     Data: 3 hour IOP group therapy session (Check-ins, Coping Skills, Relapse Prevention)     Check Ins: Therapist continued facilitation of rapport building strategies between group members. Therapist asked that each patient check in with home life and recovery efforts and identify triggers, cravings, and high risk situations that arise between group sessions. Therapist provided empathy and support during group session.     Session Content/Coping Skills: , [facility]  attended for first part of session. Kaia shared Just for Today reading. Kaia discussed with the group sponsorship and perspective. Check ins completed by group members. Clinician began review of Cravings-Basic Principles psychoeducational material (retrieved from [employer]).     Response: Patient attended class in person. Patient participated in completion of check in form. Patient on check in form answered no to question of “currently or since your last group meeting, have you had any suicidal thoughts or plan or intent to hurt yourself”, and patient also answered no on check in form to question of “currently or since your last group meeting, have you had any homicidal thoughts or plan or intent to hurt others”.     Patient during individual check in denied suicidal or homicidal thoughts.     Personal Assessment 0-10 Scale (0-none, 10-high)    Anxiety:  0   Depression:  0   Cravings: 0     Assessment:     ..  Lab on 03/13/2024   Component Date Value Ref Range Status    THC, Screen, Urine 03/13/2024 Positive (A)  Negative Final    Phencyclidine (PCP), Urine 03/13/2024 Negative  Negative Final    Cocaine Screen, Urine 03/13/2024 Negative  Negative Final    Methamphetamine, Ur 03/13/2024 Negative   Negative Final    Opiate Screen 03/13/2024 Negative  Negative Final    Amphetamine Screen, Urine 03/13/2024 Negative  Negative Final    Benzodiazepine Screen, Urine 03/13/2024 Negative  Negative Final    Tricyclic Antidepressants Screen 03/13/2024 Negative  Negative Final    Methadone Screen, Urine 03/13/2024 Negative  Negative Final    Barbiturates Screen, Urine 03/13/2024 Negative  Negative Final    Oxycodone Screen, Urine 03/13/2024 Negative  Negative Final    Buprenorphine, Screen, Urine 03/13/2024 Negative  Negative Final   Lab on 03/06/2024   Component Date Value Ref Range Status    THC, Screen, Urine 03/06/2024 Negative  Negative Final    Phencyclidine (PCP), Urine 03/06/2024 Negative  Negative Final    Cocaine Screen, Urine 03/06/2024 Negative  Negative Final    Methamphetamine, Ur 03/06/2024 Negative  Negative Final    Opiate Screen 03/06/2024 Negative  Negative Final    Amphetamine Screen, Urine 03/06/2024 Negative  Negative Final    Benzodiazepine Screen, Urine 03/06/2024 Negative  Negative Final    Tricyclic Antidepressants Screen 03/06/2024 Negative  Negative Final    Methadone Screen, Urine 03/06/2024 Negative  Negative Final    Barbiturates Screen, Urine 03/06/2024 Negative  Negative Final    Oxycodone Screen, Urine 03/06/2024 Negative  Negative Final    Buprenorphine, Screen, Urine 03/06/2024 Negative  Negative Final    PH 03/06/2024 7.6  4.5 - 9 Final    CREATININE 03/06/2024 20.2  20 - 300 mg/dL mg/dL Final    SPECIFIC GRAVITY 03/06/2024 1.003  1.003 - 1.035 Final    CODEINE 03/06/2024 Negative  50 ng/ml ng/ml Final    HYDROCODONE 03/06/2024 Negative  50 ng/ml ng/ml Final    NORHYDROCODONE 03/06/2024 Negative  50 ng/ml ng/ml Final    HYDROMORPHONE 03/06/2024 Negative  50 ng/ml ng/ml Final    MORPHINE 03/06/2024 Negative  50 ng/ml ng/ml Final    FENTANYL 03/06/2024 Negative  2 ng/ml ng/ml Final    NORFENTANYL 03/06/2024 Negative  10 ng/ml ng/ml Final    METHADONE 03/06/2024 Negative  25 ng/ml ng/ml  Final    EDDP 03/06/2024 Negative  50 ng/ml ng/ml Final    OXYCODONE 03/06/2024 Negative  50 ng/ml ng/ml Final    NOROXYCODONE 03/06/2024 Negative  50 ng/ml ng/ml Final    OXYMORPHONE 03/06/2024 Negative  50 ng/ml ng/ml Final    TAPENTADOL 03/06/2024 Negative  50 ng/ml ng/ml Final    TRAMADOL 03/06/2024 Negative  50 ng/ml ng/ml Final    BUPRENORPHINE 03/06/2024 Negative  7.5 ng/ml ng/ml Final    NORBUPRENORPHINE 03/06/2024 Negative  10 ng/ml ng/ml Final    AMOBARBITAL 03/06/2024 Negative  100 ng/ml ng/ml Final    BUTABARBITAL 03/06/2024 Negative  100 ng/ml ng/ml Final    BUTALBITAL 03/06/2024 Negative  100 ng/ml ng/ml Final    PHENOBARBITAL 03/06/2024 Negative  100 ng/ml ng/ml Final    SECOBARBITAL 03/06/2024 Negative  100 ng/ml ng/ml Final    ALPRAZOLAM 03/06/2024 Negative  50 ng/ml ng/ml Final    ALPHA-HYDROXYALPRAZOLAM 03/06/2024 Negative  50 ng/ml ng/ml Final    CLONAZEPAM 03/06/2024 Negative  50 ng/ml ng/ml Final    7- AMINOCLONAZEPAM 03/06/2024 Negative  50 ng/ml ng/ml Final    DIAZEPAM 03/06/2024 Negative  50 ng/ml ng/ml Final    NORDIAZEPAM 03/06/2024 Negative  50 ng/ml ng/ml Final    LORAZEPAM 03/06/2024 Negative  100 ng/ml ng/ml Final    MIDAZOLAM 03/06/2024 Negative  50 ng/ml ng/ml Final    ALPHA-HYDROXYMIDAZOLAM 03/06/2024 Negative  50 ng/ml ng/ml Final    OXAZEPAM 03/06/2024 Negative  50 ng/ml ng/ml Final    TEMAZEPAM 03/06/2024 Negative  50 ng/ml ng/ml Final    ETG 03/06/2024 Negative  200 ng/ml ng/ml Final    ETS 03/06/2024 Negative  50 ng/ml ng/ml Final    BENZOYLECGONINE 03/06/2024 Negative  100 ng/ml ng/ml Final    6-NELIA 03/06/2024 Negative  10 ng/ml ng/ml Final    MDMA 03/06/2024 Negative  100 ng/ml ng/ml Final    PCP 03/06/2024 Negative  20 ng/ml ng/ml Final    DELTA-9-THC 03/06/2024 Negative  20 ng/ml ng/ml Final    AMPHETAMINE 03/06/2024 Negative  250 ng/ml ng/ml Final    METHAMPHETAMINE 03/06/2024 Negative  100 ng/ml ng/ml Final    METHYLPHENIDATE 03/06/2024 Negative  10 ng/ml ng/ml Final     PHENTERMINE 03/06/2024 Negative  100 ng/ml ng/ml Final    RITALINIC ACID 03/06/2024 Negative  50 ng/ml ng/ml Final    CARISOPRODOL 03/06/2024 Negative  100 ng/ml ng/ml Final    GABAPENTIN 03/06/2024 Negative  500 ng/ml ng/ml Final    PREGABALIN 03/06/2024 Negative  250 ng/ml ng/ml Final    ZOLPIDEM 03/06/2024 Negative  2 ng/ml ng/ml Final    CARBOXYZOLPIDEM 03/06/2024 Negative  10 ng/ml ng/ml Final   Lab on 02/27/2024   Component Date Value Ref Range Status    THC, Screen, Urine 02/27/2024 Positive (A)  Negative Final    Phencyclidine (PCP), Urine 02/27/2024 Negative  Negative Final    Cocaine Screen, Urine 02/27/2024 Negative  Negative Final    Methamphetamine, Ur 02/27/2024 Negative  Negative Final    Opiate Screen 02/27/2024 Negative  Negative Final    Amphetamine Screen, Urine 02/27/2024 Negative  Negative Final    Benzodiazepine Screen, Urine 02/27/2024 Negative  Negative Final    Tricyclic Antidepressants Screen 02/27/2024 Negative  Negative Final    Methadone Screen, Urine 02/27/2024 Negative  Negative Final    Barbiturates Screen, Urine 02/27/2024 Negative  Negative Final    Oxycodone Screen, Urine 02/27/2024 Negative  Negative Final    Buprenorphine, Screen, Urine 02/27/2024 Negative  Negative Final    PH 02/27/2024 6.5  4.5 - 9 Final    CREATININE 02/27/2024 270.5  20 - 300 mg/dL mg/dL Final    SPECIFIC GRAVITY 02/27/2024 1.027  1.003 - 1.035 Final    CODEINE 02/27/2024 Negative  50 ng/ml ng/ml Final    HYDROCODONE 02/27/2024 Negative  50 ng/ml ng/ml Final    NORHYDROCODONE 02/27/2024 Negative  50 ng/ml ng/ml Final    HYDROMORPHONE 02/27/2024 Negative  50 ng/ml ng/ml Final    MORPHINE 02/27/2024 Negative  50 ng/ml ng/ml Final    FENTANYL 02/27/2024 Negative  2 ng/ml ng/ml Final    NORFENTANYL 02/27/2024 Negative  10 ng/ml ng/ml Final    METHADONE 02/27/2024 Negative  25 ng/ml ng/ml Final    EDDP 02/27/2024 Negative  50 ng/ml ng/ml Final    OXYCODONE 02/27/2024 Negative  50 ng/ml ng/ml Final    NOROXYCODONE  02/27/2024 Negative  50 ng/ml ng/ml Final    OXYMORPHONE 02/27/2024 Negative  50 ng/ml ng/ml Final    TAPENTADOL 02/27/2024 Negative  50 ng/ml ng/ml Final    TRAMADOL 02/27/2024 Negative  50 ng/ml ng/ml Final    BUPRENORPHINE 02/27/2024 Negative  7.5 ng/ml ng/ml Final    NORBUPRENORPHINE 02/27/2024 Negative  10 ng/ml ng/ml Final    AMOBARBITAL 02/27/2024 Negative  100 ng/ml ng/ml Final    BUTABARBITAL 02/27/2024 Negative  100 ng/ml ng/ml Final    BUTALBITAL 02/27/2024 Negative  100 ng/ml ng/ml Final    PHENOBARBITAL 02/27/2024 Negative  100 ng/ml ng/ml Final    SECOBARBITAL 02/27/2024 Negative  100 ng/ml ng/ml Final    ALPRAZOLAM 02/27/2024 Negative  50 ng/ml ng/ml Final    ALPHA-HYDROXYALPRAZOLAM 02/27/2024 Negative  50 ng/ml ng/ml Final    CLONAZEPAM 02/27/2024 Negative  50 ng/ml ng/ml Final    7- AMINOCLONAZEPAM 02/27/2024 Negative  50 ng/ml ng/ml Final    DIAZEPAM 02/27/2024 Negative  50 ng/ml ng/ml Final    NORDIAZEPAM 02/27/2024 Negative  50 ng/ml ng/ml Final    LORAZEPAM 02/27/2024 Negative  100 ng/ml ng/ml Final    MIDAZOLAM 02/27/2024 Negative  50 ng/ml ng/ml Final    ALPHA-HYDROXYMIDAZOLAM 02/27/2024 Negative  50 ng/ml ng/ml Final    OXAZEPAM 02/27/2024 Negative  50 ng/ml ng/ml Final    TEMAZEPAM 02/27/2024 Negative  50 ng/ml ng/ml Final    ETG 02/27/2024 Negative  200 ng/ml ng/ml Final    ETS 02/27/2024 Negative  50 ng/ml ng/ml Final    BENZOYLECGONINE 02/27/2024 Negative  100 ng/ml ng/ml Final    6-NELIA 02/27/2024 Negative  10 ng/ml ng/ml Final    MDMA 02/27/2024 Negative  100 ng/ml ng/ml Final    PCP 02/27/2024 Negative  20 ng/ml ng/ml Final    DELTA-9-THC 02/27/2024 62 (C)  20 ng/ml ng/ml Final    AMPHETAMINE 02/27/2024 Negative  250 ng/ml ng/ml Final    METHAMPHETAMINE 02/27/2024 Negative  100 ng/ml ng/ml Final    METHYLPHENIDATE 02/27/2024 Negative  10 ng/ml ng/ml Final    PHENTERMINE 02/27/2024 Negative  100 ng/ml ng/ml Final    RITALINIC ACID 02/27/2024 Negative  50 ng/ml ng/ml Final    CARISOPRODOL  02/27/2024 Negative  100 ng/ml ng/ml Final    GABAPENTIN 02/27/2024 Negative  500 ng/ml ng/ml Final    PREGABALIN 02/27/2024 Negative  250 ng/ml ng/ml Final    ZOLPIDEM 02/27/2024 Negative  2 ng/ml ng/ml Final    CARBOXYZOLPIDEM 02/27/2024 Negative  10 ng/ml ng/ml Final       Mental Status Exam  Hygiene:  good  Dress: casual  Attitude: cooperative and agreeable   Motor Activity: appropriate  Eye Contact:  good  Speech: regular rate and rhythm   Mood:  calm and cooperative  Affect:  Appropriate  Thought Processes:  Linear  Thought Content:  Normal  Suicidal Thoughts:  denies  Homicidal Thoughts:  denies  Crisis Safety Plan: Safety plan has been discussed.   Hallucinations:  Unknown to clinician.   Reliability: fair  Insight: fair  Judgement: fair  Impulse Control: fair    Recovery/spiritual support group attendance: No.     Progress toward goal: Not at goal    Prognosis: Fair with Ongoing Treatment     Self-reported number of days sober: Patient on check in form reported [place] 109- [place] 45.     Patient will contact this office, call 911 or present to the nearest emergency room should suicidal or homicidal ideations occur.    Impression/Formulation:    ICD-10-CM ICD-9-CM   1. Alcohol use disorder, severe, dependence  F10.20 303.90       Clinical Maneuvering/Interventions: Therapist utilized a person-centered approach to build rapport with group member. Therapist implemented motivational interviewing techniques to assist client with exploring and resolving ambivalence associated with commitment to change behaviors related to substance use and addiction. Therapist applied cognitive behavioral strategies to facilitate identification of maladaptive patterns of thinking and behavior that contribute to client's risk for continued substance use and relapse. Therapist employed group interaction activities to build rapport among group members, promote sobriety, and emphasize relapse prevention. Therapist promoted safe  nonjudgmental environment by providing group members with unconditional positive regard and encouraging group members to comply with group rules and guidelines. Therapist assisted group member with identifying and implementing healthier coping strategies.      Plan:  Continue [facility] IOP Phase I   Aftercare:  [facility] Phase II  Program Assignments:  Personal recovery plan, relapse prevention plan, attendance of recovery support group meetings, exploration of sponsorship, drug/alcohol screens.     Stan Powell LCSW  3/19/2024  17:30 EDT

## 2024-03-20 ENCOUNTER — OFFICE VISIT (OUTPATIENT)
Dept: PSYCHIATRY | Facility: HOSPITAL | Age: 29
End: 2024-03-20
Payer: COMMERCIAL

## 2024-03-20 DIAGNOSIS — F10.20 ALCOHOL USE DISORDER, SEVERE, DEPENDENCE: Primary | ICD-10-CM

## 2024-03-20 PROCEDURE — H0015 ALCOHOL AND/OR DRUG SERVICES: HCPCS | Performed by: NURSE PRACTITIONER

## 2024-03-20 NOTE — PROGRESS NOTES
"[facility] IOP GROUP     Date: 03/14/2024  Name: Paula Linares    Time In: 1800   Time Out: Approximately 2040     Number of participants: 11    IOP GROUP NOTE     Data: 3 hour IOP group therapy session (Check-ins, Coping Skills, Relapse Prevention)     Check Ins: Therapist continued facilitation of rapport building strategies between group members. Therapist asked that each patient check in with home life and recovery efforts and identify triggers, cravings, and high risk situations that arise between group sessions. Therapist provided empathy and support during group session.     Session Content/Coping Skills: , Kaia, joined for first part of meeting. Kaia discussed the [employer] chanelle. Group members participated in a “Fun Facts” activity to build group cohesion. Individual treatment plans reviewed and signed by group members. Clinician provided group members with a coping skills log and explained how to use the coping skills log while in -IOP. Cravings Basic Principles, Coping with Cravings skills list, Prepare and Practice Emotional, Physical, and Spiritual self-care psychoeducational material reviewed and discussed (Retrieved from [employer]). Coping with Cravings proactive exercise #1 discussed as a group.     Response: Patient attended class in person. Patient participated in completion of check in form. Patient on check in form answered no to question of “currently or since your last group meeting, have you had any suicidal thoughts or plan or intent to hurt yourself”, and patient also answered no on check in form to question of “currently or since your last group meeting, have you had any homicidal thoughts or plan or intent to hurt others”.     Personal Assessment 0-10 Scale (0-none, 10-high)    Anxiety:  1   Depression:  0   Cravings: 0- Patient on check in form reported \"I did crave last night, but I didn't drink. Just bothered my drug test is positive " "for weed and I really haven't smoked\".      Assessment:     ..  Lab on 03/13/2024   Component Date Value Ref Range Status    THC, Screen, Urine 03/13/2024 Positive (A)  Negative Final    Phencyclidine (PCP), Urine 03/13/2024 Negative  Negative Final    Cocaine Screen, Urine 03/13/2024 Negative  Negative Final    Methamphetamine, Ur 03/13/2024 Negative  Negative Final    Opiate Screen 03/13/2024 Negative  Negative Final    Amphetamine Screen, Urine 03/13/2024 Negative  Negative Final    Benzodiazepine Screen, Urine 03/13/2024 Negative  Negative Final    Tricyclic Antidepressants Screen 03/13/2024 Negative  Negative Final    Methadone Screen, Urine 03/13/2024 Negative  Negative Final    Barbiturates Screen, Urine 03/13/2024 Negative  Negative Final    Oxycodone Screen, Urine 03/13/2024 Negative  Negative Final    Buprenorphine, Screen, Urine 03/13/2024 Negative  Negative Final   Lab on 03/06/2024   Component Date Value Ref Range Status    THC, Screen, Urine 03/06/2024 Negative  Negative Final    Phencyclidine (PCP), Urine 03/06/2024 Negative  Negative Final    Cocaine Screen, Urine 03/06/2024 Negative  Negative Final    Methamphetamine, Ur 03/06/2024 Negative  Negative Final    Opiate Screen 03/06/2024 Negative  Negative Final    Amphetamine Screen, Urine 03/06/2024 Negative  Negative Final    Benzodiazepine Screen, Urine 03/06/2024 Negative  Negative Final    Tricyclic Antidepressants Screen 03/06/2024 Negative  Negative Final    Methadone Screen, Urine 03/06/2024 Negative  Negative Final    Barbiturates Screen, Urine 03/06/2024 Negative  Negative Final    Oxycodone Screen, Urine 03/06/2024 Negative  Negative Final    Buprenorphine, Screen, Urine 03/06/2024 Negative  Negative Final    PH 03/06/2024 7.6  4.5 - 9 Final    CREATININE 03/06/2024 20.2  20 - 300 mg/dL mg/dL Final    SPECIFIC GRAVITY 03/06/2024 1.003  1.003 - 1.035 Final    CODEINE 03/06/2024 Negative  50 ng/ml ng/ml Final    HYDROCODONE 03/06/2024 Negative "  50 ng/ml ng/ml Final    NORHYDROCODONE 03/06/2024 Negative  50 ng/ml ng/ml Final    HYDROMORPHONE 03/06/2024 Negative  50 ng/ml ng/ml Final    MORPHINE 03/06/2024 Negative  50 ng/ml ng/ml Final    FENTANYL 03/06/2024 Negative  2 ng/ml ng/ml Final    NORFENTANYL 03/06/2024 Negative  10 ng/ml ng/ml Final    METHADONE 03/06/2024 Negative  25 ng/ml ng/ml Final    EDDP 03/06/2024 Negative  50 ng/ml ng/ml Final    OXYCODONE 03/06/2024 Negative  50 ng/ml ng/ml Final    NOROXYCODONE 03/06/2024 Negative  50 ng/ml ng/ml Final    OXYMORPHONE 03/06/2024 Negative  50 ng/ml ng/ml Final    TAPENTADOL 03/06/2024 Negative  50 ng/ml ng/ml Final    TRAMADOL 03/06/2024 Negative  50 ng/ml ng/ml Final    BUPRENORPHINE 03/06/2024 Negative  7.5 ng/ml ng/ml Final    NORBUPRENORPHINE 03/06/2024 Negative  10 ng/ml ng/ml Final    AMOBARBITAL 03/06/2024 Negative  100 ng/ml ng/ml Final    BUTABARBITAL 03/06/2024 Negative  100 ng/ml ng/ml Final    BUTALBITAL 03/06/2024 Negative  100 ng/ml ng/ml Final    PHENOBARBITAL 03/06/2024 Negative  100 ng/ml ng/ml Final    SECOBARBITAL 03/06/2024 Negative  100 ng/ml ng/ml Final    ALPRAZOLAM 03/06/2024 Negative  50 ng/ml ng/ml Final    ALPHA-HYDROXYALPRAZOLAM 03/06/2024 Negative  50 ng/ml ng/ml Final    CLONAZEPAM 03/06/2024 Negative  50 ng/ml ng/ml Final    7- AMINOCLONAZEPAM 03/06/2024 Negative  50 ng/ml ng/ml Final    DIAZEPAM 03/06/2024 Negative  50 ng/ml ng/ml Final    NORDIAZEPAM 03/06/2024 Negative  50 ng/ml ng/ml Final    LORAZEPAM 03/06/2024 Negative  100 ng/ml ng/ml Final    MIDAZOLAM 03/06/2024 Negative  50 ng/ml ng/ml Final    ALPHA-HYDROXYMIDAZOLAM 03/06/2024 Negative  50 ng/ml ng/ml Final    OXAZEPAM 03/06/2024 Negative  50 ng/ml ng/ml Final    TEMAZEPAM 03/06/2024 Negative  50 ng/ml ng/ml Final    ETG 03/06/2024 Negative  200 ng/ml ng/ml Final    ETS 03/06/2024 Negative  50 ng/ml ng/ml Final    BENZOYLECGONINE 03/06/2024 Negative  100 ng/ml ng/ml Final    6-NELIA 03/06/2024 Negative  10 ng/ml  ng/ml Final    MDMA 03/06/2024 Negative  100 ng/ml ng/ml Final    PCP 03/06/2024 Negative  20 ng/ml ng/ml Final    DELTA-9-THC 03/06/2024 Negative  20 ng/ml ng/ml Final    AMPHETAMINE 03/06/2024 Negative  250 ng/ml ng/ml Final    METHAMPHETAMINE 03/06/2024 Negative  100 ng/ml ng/ml Final    METHYLPHENIDATE 03/06/2024 Negative  10 ng/ml ng/ml Final    PHENTERMINE 03/06/2024 Negative  100 ng/ml ng/ml Final    RITALINIC ACID 03/06/2024 Negative  50 ng/ml ng/ml Final    CARISOPRODOL 03/06/2024 Negative  100 ng/ml ng/ml Final    GABAPENTIN 03/06/2024 Negative  500 ng/ml ng/ml Final    PREGABALIN 03/06/2024 Negative  250 ng/ml ng/ml Final    ZOLPIDEM 03/06/2024 Negative  2 ng/ml ng/ml Final    CARBOXYZOLPIDEM 03/06/2024 Negative  10 ng/ml ng/ml Final   Lab on 02/27/2024   Component Date Value Ref Range Status    THC, Screen, Urine 02/27/2024 Positive (A)  Negative Final    Phencyclidine (PCP), Urine 02/27/2024 Negative  Negative Final    Cocaine Screen, Urine 02/27/2024 Negative  Negative Final    Methamphetamine, Ur 02/27/2024 Negative  Negative Final    Opiate Screen 02/27/2024 Negative  Negative Final    Amphetamine Screen, Urine 02/27/2024 Negative  Negative Final    Benzodiazepine Screen, Urine 02/27/2024 Negative  Negative Final    Tricyclic Antidepressants Screen 02/27/2024 Negative  Negative Final    Methadone Screen, Urine 02/27/2024 Negative  Negative Final    Barbiturates Screen, Urine 02/27/2024 Negative  Negative Final    Oxycodone Screen, Urine 02/27/2024 Negative  Negative Final    Buprenorphine, Screen, Urine 02/27/2024 Negative  Negative Final    PH 02/27/2024 6.5  4.5 - 9 Final    CREATININE 02/27/2024 270.5  20 - 300 mg/dL mg/dL Final    SPECIFIC GRAVITY 02/27/2024 1.027  1.003 - 1.035 Final    CODEINE 02/27/2024 Negative  50 ng/ml ng/ml Final    HYDROCODONE 02/27/2024 Negative  50 ng/ml ng/ml Final    NORHYDROCODONE 02/27/2024 Negative  50 ng/ml ng/ml Final    HYDROMORPHONE 02/27/2024 Negative  50  ng/ml ng/ml Final    MORPHINE 02/27/2024 Negative  50 ng/ml ng/ml Final    FENTANYL 02/27/2024 Negative  2 ng/ml ng/ml Final    NORFENTANYL 02/27/2024 Negative  10 ng/ml ng/ml Final    METHADONE 02/27/2024 Negative  25 ng/ml ng/ml Final    EDDP 02/27/2024 Negative  50 ng/ml ng/ml Final    OXYCODONE 02/27/2024 Negative  50 ng/ml ng/ml Final    NOROXYCODONE 02/27/2024 Negative  50 ng/ml ng/ml Final    OXYMORPHONE 02/27/2024 Negative  50 ng/ml ng/ml Final    TAPENTADOL 02/27/2024 Negative  50 ng/ml ng/ml Final    TRAMADOL 02/27/2024 Negative  50 ng/ml ng/ml Final    BUPRENORPHINE 02/27/2024 Negative  7.5 ng/ml ng/ml Final    NORBUPRENORPHINE 02/27/2024 Negative  10 ng/ml ng/ml Final    AMOBARBITAL 02/27/2024 Negative  100 ng/ml ng/ml Final    BUTABARBITAL 02/27/2024 Negative  100 ng/ml ng/ml Final    BUTALBITAL 02/27/2024 Negative  100 ng/ml ng/ml Final    PHENOBARBITAL 02/27/2024 Negative  100 ng/ml ng/ml Final    SECOBARBITAL 02/27/2024 Negative  100 ng/ml ng/ml Final    ALPRAZOLAM 02/27/2024 Negative  50 ng/ml ng/ml Final    ALPHA-HYDROXYALPRAZOLAM 02/27/2024 Negative  50 ng/ml ng/ml Final    CLONAZEPAM 02/27/2024 Negative  50 ng/ml ng/ml Final    7- AMINOCLONAZEPAM 02/27/2024 Negative  50 ng/ml ng/ml Final    DIAZEPAM 02/27/2024 Negative  50 ng/ml ng/ml Final    NORDIAZEPAM 02/27/2024 Negative  50 ng/ml ng/ml Final    LORAZEPAM 02/27/2024 Negative  100 ng/ml ng/ml Final    MIDAZOLAM 02/27/2024 Negative  50 ng/ml ng/ml Final    ALPHA-HYDROXYMIDAZOLAM 02/27/2024 Negative  50 ng/ml ng/ml Final    OXAZEPAM 02/27/2024 Negative  50 ng/ml ng/ml Final    TEMAZEPAM 02/27/2024 Negative  50 ng/ml ng/ml Final    ETG 02/27/2024 Negative  200 ng/ml ng/ml Final    ETS 02/27/2024 Negative  50 ng/ml ng/ml Final    BENZOYLECGONINE 02/27/2024 Negative  100 ng/ml ng/ml Final    6-NELIA 02/27/2024 Negative  10 ng/ml ng/ml Final    MDMA 02/27/2024 Negative  100 ng/ml ng/ml Final    PCP 02/27/2024 Negative  20 ng/ml ng/ml Final     DELTA-9-THC 02/27/2024 62 (C)  20 ng/ml ng/ml Final    AMPHETAMINE 02/27/2024 Negative  250 ng/ml ng/ml Final    METHAMPHETAMINE 02/27/2024 Negative  100 ng/ml ng/ml Final    METHYLPHENIDATE 02/27/2024 Negative  10 ng/ml ng/ml Final    PHENTERMINE 02/27/2024 Negative  100 ng/ml ng/ml Final    RITALINIC ACID 02/27/2024 Negative  50 ng/ml ng/ml Final    CARISOPRODOL 02/27/2024 Negative  100 ng/ml ng/ml Final    GABAPENTIN 02/27/2024 Negative  500 ng/ml ng/ml Final    PREGABALIN 02/27/2024 Negative  250 ng/ml ng/ml Final    ZOLPIDEM 02/27/2024 Negative  2 ng/ml ng/ml Final    CARBOXYZOLPIDEM 02/27/2024 Negative  10 ng/ml ng/ml Final       Mental Status Exam  Hygiene:  good  Dress: casual  Attitude: cooperative and agreeable   Motor Activity: appropriate  Eye Contact:  good  Speech: regular rate and rhythm   Mood:  calm and cooperative  Affect:  Appropriate  Thought Processes:  Linear  Thought Content:  Normal  Suicidal Thoughts:  denies  Homicidal Thoughts:  denies  Crisis Safety Plan: Safety plan has been discussed.   Hallucinations:  Unknown to clinician.   Reliability: fair  Insight: fair  Judgement: fair  Impulse Control: fair    Recovery/spiritual support group attendance: No.      Progress toward goal: Not at goal    Prognosis: Fair with Ongoing Treatment     Self-reported number of days sober: Patient on check in form reported Alc 110- Shaw 46.     Patient will contact this office, call 911 or present to the nearest emergency room should suicidal or homicidal ideations occur.    Impression/Formulation:    ICD-10-CM ICD-9-CM   1. Alcohol use disorder, severe, dependence  F10.20 303.90       Clinical Maneuvering/Interventions: Therapist utilized a person-centered approach to build rapport with group member. Therapist implemented motivational interviewing techniques to assist client with exploring and resolving ambivalence associated with commitment to change behaviors related to substance use and addiction.  Therapist applied cognitive behavioral strategies to facilitate identification of maladaptive patterns of thinking and behavior that contribute to client's risk for continued substance use and relapse. Therapist employed group interaction activities to build rapport among group members, promote sobriety, and emphasize relapse prevention. Therapist promoted safe nonjudgmental environment by providing group members with unconditional positive regard and encouraging group members to comply with group rules and guidelines. Therapist assisted group member with identifying and implementing healthier coping strategies.      Plan:  Continue [facility] IOP Phase I   Aftercare:  [facility] Phase II  Program Assignments:  Personal recovery plan, relapse prevention plan, attendance of recovery support group meetings, exploration of sponsorship, drug/alcohol screens.     Stan Powell LCSW  3/20/2024  09:25 EDT

## 2024-03-21 ENCOUNTER — OFFICE VISIT (OUTPATIENT)
Dept: PSYCHIATRY | Facility: HOSPITAL | Age: 29
End: 2024-03-21
Payer: COMMERCIAL

## 2024-03-21 ENCOUNTER — LAB (OUTPATIENT)
Dept: LAB | Facility: HOSPITAL | Age: 29
End: 2024-03-21
Payer: COMMERCIAL

## 2024-03-21 DIAGNOSIS — F10.20 ALCOHOL USE DISORDER, SEVERE, DEPENDENCE: ICD-10-CM

## 2024-03-21 DIAGNOSIS — F10.20 ALCOHOL USE DISORDER, SEVERE, DEPENDENCE: Primary | ICD-10-CM

## 2024-03-21 PROCEDURE — 80306 DRUG TEST PRSMV INSTRMNT: CPT

## 2024-03-21 PROCEDURE — H0015 ALCOHOL AND/OR DRUG SERVICES: HCPCS | Performed by: NURSE PRACTITIONER

## 2024-03-25 ENCOUNTER — OFFICE VISIT (OUTPATIENT)
Dept: PSYCHIATRY | Facility: HOSPITAL | Age: 29
End: 2024-03-25
Payer: COMMERCIAL

## 2024-03-25 DIAGNOSIS — F10.20 ALCOHOL USE DISORDER, SEVERE, DEPENDENCE: Primary | ICD-10-CM

## 2024-03-25 PROCEDURE — H0015 ALCOHOL AND/OR DRUG SERVICES: HCPCS | Performed by: NURSE PRACTITIONER

## 2024-03-26 NOTE — PROGRESS NOTES
[facility] IOP GROUP     Date: 03/21/2024  Name: Paula Linares    Time In: 1800   Time Out: Approximately 2045      Number of participants: 13    IOP GROUP NOTE     Data: 3 hour IOP group therapy session (Check-ins, Coping Skills, Relapse Prevention)     Check Ins: Therapist continued facilitation of rapport building strategies between group members. Therapist asked that each patient check in with home life and recovery efforts and identify triggers, cravings, and high risk situations that arise between group sessions. Therapist provided empathy and support during group session.     Session Content/Coping Skills: [employer] student, attended for first part of session. , Kaia, also attended for first part of session. Check ins completed by group members. Article “8 types of Denial in Addiction” reviewed and discussed ([email or web address]). Clinician discussed with group members the importance of recognizing and challenging negative thoughts. Group members participated in substance abuse crossword activity.      Response: Patient attended class in person. Patient participated in completion of check in form. Patient on check in form answered no to question of “currently or since your last group meeting, have you had any suicidal thoughts or plan or intent to hurt yourself”, and patient also answered no on check in form to question of “currently or since your last group meeting, have you had any homicidal thoughts or plan or intent to hurt others”.     Patient during individual check in denied suicidal or homicidal thoughts and patient denied marijuana use since January.       Personal Assessment 0-10 Scale (0-none, 10-high)    Anxiety:  0   Depression:  0   Cravings: 1     Assessment:     ..  Lab on 03/21/2024   Component Date Value Ref Range Status    THC, Screen, Urine 03/21/2024 Positive (A)  Negative Final    Phencyclidine (PCP), Urine 03/21/2024 Negative  Negative Final    Cocaine Screen,  Urine 03/21/2024 Negative  Negative Final    Methamphetamine, Ur 03/21/2024 Negative  Negative Final    Opiate Screen 03/21/2024 Negative  Negative Final    Amphetamine Screen, Urine 03/21/2024 Negative  Negative Final    Benzodiazepine Screen, Urine 03/21/2024 Negative  Negative Final    Tricyclic Antidepressants Screen 03/21/2024 Negative  Negative Final    Methadone Screen, Urine 03/21/2024 Negative  Negative Final    Barbiturates Screen, Urine 03/21/2024 Negative  Negative Final    Oxycodone Screen, Urine 03/21/2024 Negative  Negative Final    Buprenorphine, Screen, Urine 03/21/2024 Negative  Negative Final   Lab on 03/13/2024   Component Date Value Ref Range Status    THC, Screen, Urine 03/13/2024 Positive (A)  Negative Final    Phencyclidine (PCP), Urine 03/13/2024 Negative  Negative Final    Cocaine Screen, Urine 03/13/2024 Negative  Negative Final    Methamphetamine, Ur 03/13/2024 Negative  Negative Final    Opiate Screen 03/13/2024 Negative  Negative Final    Amphetamine Screen, Urine 03/13/2024 Negative  Negative Final    Benzodiazepine Screen, Urine 03/13/2024 Negative  Negative Final    Tricyclic Antidepressants Screen 03/13/2024 Negative  Negative Final    Methadone Screen, Urine 03/13/2024 Negative  Negative Final    Barbiturates Screen, Urine 03/13/2024 Negative  Negative Final    Oxycodone Screen, Urine 03/13/2024 Negative  Negative Final    Buprenorphine, Screen, Urine 03/13/2024 Negative  Negative Final   Lab on 03/06/2024   Component Date Value Ref Range Status    THC, Screen, Urine 03/06/2024 Negative  Negative Final    Phencyclidine (PCP), Urine 03/06/2024 Negative  Negative Final    Cocaine Screen, Urine 03/06/2024 Negative  Negative Final    Methamphetamine, Ur 03/06/2024 Negative  Negative Final    Opiate Screen 03/06/2024 Negative  Negative Final    Amphetamine Screen, Urine 03/06/2024 Negative  Negative Final    Benzodiazepine Screen, Urine 03/06/2024 Negative  Negative Final    Tricyclic  Antidepressants Screen 03/06/2024 Negative  Negative Final    Methadone Screen, Urine 03/06/2024 Negative  Negative Final    Barbiturates Screen, Urine 03/06/2024 Negative  Negative Final    Oxycodone Screen, Urine 03/06/2024 Negative  Negative Final    Buprenorphine, Screen, Urine 03/06/2024 Negative  Negative Final    PH 03/06/2024 7.6  4.5 - 9 Final    CREATININE 03/06/2024 20.2  20 - 300 mg/dL mg/dL Final    SPECIFIC GRAVITY 03/06/2024 1.003  1.003 - 1.035 Final    CODEINE 03/06/2024 Negative  50 ng/ml ng/ml Final    HYDROCODONE 03/06/2024 Negative  50 ng/ml ng/ml Final    NORHYDROCODONE 03/06/2024 Negative  50 ng/ml ng/ml Final    HYDROMORPHONE 03/06/2024 Negative  50 ng/ml ng/ml Final    MORPHINE 03/06/2024 Negative  50 ng/ml ng/ml Final    FENTANYL 03/06/2024 Negative  2 ng/ml ng/ml Final    NORFENTANYL 03/06/2024 Negative  10 ng/ml ng/ml Final    METHADONE 03/06/2024 Negative  25 ng/ml ng/ml Final    EDDP 03/06/2024 Negative  50 ng/ml ng/ml Final    OXYCODONE 03/06/2024 Negative  50 ng/ml ng/ml Final    NOROXYCODONE 03/06/2024 Negative  50 ng/ml ng/ml Final    OXYMORPHONE 03/06/2024 Negative  50 ng/ml ng/ml Final    TAPENTADOL 03/06/2024 Negative  50 ng/ml ng/ml Final    TRAMADOL 03/06/2024 Negative  50 ng/ml ng/ml Final    BUPRENORPHINE 03/06/2024 Negative  7.5 ng/ml ng/ml Final    NORBUPRENORPHINE 03/06/2024 Negative  10 ng/ml ng/ml Final    AMOBARBITAL 03/06/2024 Negative  100 ng/ml ng/ml Final    BUTABARBITAL 03/06/2024 Negative  100 ng/ml ng/ml Final    BUTALBITAL 03/06/2024 Negative  100 ng/ml ng/ml Final    PHENOBARBITAL 03/06/2024 Negative  100 ng/ml ng/ml Final    SECOBARBITAL 03/06/2024 Negative  100 ng/ml ng/ml Final    ALPRAZOLAM 03/06/2024 Negative  50 ng/ml ng/ml Final    ALPHA-HYDROXYALPRAZOLAM 03/06/2024 Negative  50 ng/ml ng/ml Final    CLONAZEPAM 03/06/2024 Negative  50 ng/ml ng/ml Final    7- AMINOCLONAZEPAM 03/06/2024 Negative  50 ng/ml ng/ml Final    DIAZEPAM 03/06/2024 Negative  50  ng/ml ng/ml Final    NORDIAZEPAM 03/06/2024 Negative  50 ng/ml ng/ml Final    LORAZEPAM 03/06/2024 Negative  100 ng/ml ng/ml Final    MIDAZOLAM 03/06/2024 Negative  50 ng/ml ng/ml Final    ALPHA-HYDROXYMIDAZOLAM 03/06/2024 Negative  50 ng/ml ng/ml Final    OXAZEPAM 03/06/2024 Negative  50 ng/ml ng/ml Final    TEMAZEPAM 03/06/2024 Negative  50 ng/ml ng/ml Final    ETG 03/06/2024 Negative  200 ng/ml ng/ml Final    ETS 03/06/2024 Negative  50 ng/ml ng/ml Final    BENZOYLECGONINE 03/06/2024 Negative  100 ng/ml ng/ml Final    6-NELIA 03/06/2024 Negative  10 ng/ml ng/ml Final    MDMA 03/06/2024 Negative  100 ng/ml ng/ml Final    PCP 03/06/2024 Negative  20 ng/ml ng/ml Final    DELTA-9-THC 03/06/2024 Negative  20 ng/ml ng/ml Final    AMPHETAMINE 03/06/2024 Negative  250 ng/ml ng/ml Final    METHAMPHETAMINE 03/06/2024 Negative  100 ng/ml ng/ml Final    METHYLPHENIDATE 03/06/2024 Negative  10 ng/ml ng/ml Final    PHENTERMINE 03/06/2024 Negative  100 ng/ml ng/ml Final    RITALINIC ACID 03/06/2024 Negative  50 ng/ml ng/ml Final    CARISOPRODOL 03/06/2024 Negative  100 ng/ml ng/ml Final    GABAPENTIN 03/06/2024 Negative  500 ng/ml ng/ml Final    PREGABALIN 03/06/2024 Negative  250 ng/ml ng/ml Final    ZOLPIDEM 03/06/2024 Negative  2 ng/ml ng/ml Final    CARBOXYZOLPIDEM 03/06/2024 Negative  10 ng/ml ng/ml Final       Mental Status Exam  Hygiene:  good  Dress: casual  Attitude: cooperative and agreeable   Motor Activity: appropriate  Eye Contact:  good  Speech: regular rate and rhythm   Mood:  calm and cooperative  Affect:  Appropriate  Thought Processes:  Linear  Thought Content:  Normal  Suicidal Thoughts:  denies  Homicidal Thoughts:  denies  Crisis Safety Plan: Safety plan has been discussed.   Hallucinations:  Unknown to clinician.   Reliability: fair  Insight: fair  Judgement: fair  Impulse Control: fair    Recovery/spiritual support group attendance: No.     Progress toward goal: Not at goal    Prognosis: Fair with Ongoing  Treatment     Self-reported number of days sober: Patient on check in form reported Alc 3 mon. 24 days/ Weed 1 mon. 20 days.     Patient will contact this office, call 911 or present to the nearest emergency room should suicidal or homicidal ideations occur.    Impression/Formulation:    ICD-10-CM ICD-9-CM   1. Alcohol use disorder, severe, dependence  F10.20 303.90       Clinical Maneuvering/Interventions: Therapist utilized a person-centered approach to build rapport with group member. Therapist implemented motivational interviewing techniques to assist client with exploring and resolving ambivalence associated with commitment to change behaviors related to substance use and addiction. Therapist applied cognitive behavioral strategies to facilitate identification of maladaptive patterns of thinking and behavior that contribute to client's risk for continued substance use and relapse. Therapist employed group interaction activities to build rapport among group members, promote sobriety, and emphasize relapse prevention. Therapist promoted safe nonjudgmental environment by providing group members with unconditional positive regard and encouraging group members to comply with group rules and guidelines. Therapist assisted group member with identifying and implementing healthier coping strategies.      Plan:  Continue [facility] IOP Phase I   Aftercare:  [facility] Phase II  Program Assignments:  Personal recovery plan, relapse prevention plan, attendance of recovery support group meetings, exploration of sponsorship, drug/alcohol screens.     Stan Powell LCSW  3/26/2024  20:08 EDT

## 2024-03-26 NOTE — PROGRESS NOTES
CD IOP GROUP     Date: 03/18/2024  Name: Paula Linares    Time In: 1800   Time Out: 2053     Number of participants: 12    IOP GROUP NOTE     Data: 3 hour IOP group therapy session (Check-ins, Coping Skills, Relapse Prevention)     Check Ins: Therapist continued facilitation of rapport building strategies between group members. Therapist asked that each patient check in with home life and recovery efforts and identify triggers, cravings, and high risk situations that arise between group sessions. Therapist provided empathy and support during group session.     Session Content/Coping Skills: [employer] student, attended session. Check ins completed by group members. Clinician facilitated discussion regarding strengths and explored ways strengths could be utilized to support recovery. Group members participated in strengths identification activity where they listed their top 4 strengths on a shield. Group members shared and discussed their top 4 strengths. CBT introduced. Therapist Aid The Cognitive Model, Cognitive Distortions, fact or opinion, Challenging anxious thoughts and thought log psychoeducational material reviewed and discussed. Group members developed a practice example using the CBT model.     Response: Patient attended class in person. Patient participated in completion of check in form. Patient on check in form answered no to question of “currently or since your last group meeting, have you had any suicidal thoughts or plan or intent to hurt yourself”, and patient also answered no on check in form to question of “currently or since your last group meeting, have you had any homicidal thoughts or plan or intent to hurt others”.     Personal Assessment 0-10 Scale (0-none, 10-high)    Anxiety:  0   Depression:  0   Cravings: 0     Assessment:     ..  Lab on 03/13/2024   Component Date Value Ref Range Status    THC, Screen, Urine 03/13/2024 Positive (A)  Negative Final    Phencyclidine (PCP), Urine  03/13/2024 Negative  Negative Final    Cocaine Screen, Urine 03/13/2024 Negative  Negative Final    Methamphetamine, Ur 03/13/2024 Negative  Negative Final    Opiate Screen 03/13/2024 Negative  Negative Final    Amphetamine Screen, Urine 03/13/2024 Negative  Negative Final    Benzodiazepine Screen, Urine 03/13/2024 Negative  Negative Final    Tricyclic Antidepressants Screen 03/13/2024 Negative  Negative Final    Methadone Screen, Urine 03/13/2024 Negative  Negative Final    Barbiturates Screen, Urine 03/13/2024 Negative  Negative Final    Oxycodone Screen, Urine 03/13/2024 Negative  Negative Final    Buprenorphine, Screen, Urine 03/13/2024 Negative  Negative Final   Lab on 03/06/2024   Component Date Value Ref Range Status    THC, Screen, Urine 03/06/2024 Negative  Negative Final    Phencyclidine (PCP), Urine 03/06/2024 Negative  Negative Final    Cocaine Screen, Urine 03/06/2024 Negative  Negative Final    Methamphetamine, Ur 03/06/2024 Negative  Negative Final    Opiate Screen 03/06/2024 Negative  Negative Final    Amphetamine Screen, Urine 03/06/2024 Negative  Negative Final    Benzodiazepine Screen, Urine 03/06/2024 Negative  Negative Final    Tricyclic Antidepressants Screen 03/06/2024 Negative  Negative Final    Methadone Screen, Urine 03/06/2024 Negative  Negative Final    Barbiturates Screen, Urine 03/06/2024 Negative  Negative Final    Oxycodone Screen, Urine 03/06/2024 Negative  Negative Final    Buprenorphine, Screen, Urine 03/06/2024 Negative  Negative Final    PH 03/06/2024 7.6  4.5 - 9 Final    CREATININE 03/06/2024 20.2  20 - 300 mg/dL mg/dL Final    SPECIFIC GRAVITY 03/06/2024 1.003  1.003 - 1.035 Final    CODEINE 03/06/2024 Negative  50 ng/ml ng/ml Final    HYDROCODONE 03/06/2024 Negative  50 ng/ml ng/ml Final    NORHYDROCODONE 03/06/2024 Negative  50 ng/ml ng/ml Final    HYDROMORPHONE 03/06/2024 Negative  50 ng/ml ng/ml Final    MORPHINE 03/06/2024 Negative  50 ng/ml ng/ml Final    FENTANYL  03/06/2024 Negative  2 ng/ml ng/ml Final    NORFENTANYL 03/06/2024 Negative  10 ng/ml ng/ml Final    METHADONE 03/06/2024 Negative  25 ng/ml ng/ml Final    EDDP 03/06/2024 Negative  50 ng/ml ng/ml Final    OXYCODONE 03/06/2024 Negative  50 ng/ml ng/ml Final    NOROXYCODONE 03/06/2024 Negative  50 ng/ml ng/ml Final    OXYMORPHONE 03/06/2024 Negative  50 ng/ml ng/ml Final    TAPENTADOL 03/06/2024 Negative  50 ng/ml ng/ml Final    TRAMADOL 03/06/2024 Negative  50 ng/ml ng/ml Final    BUPRENORPHINE 03/06/2024 Negative  7.5 ng/ml ng/ml Final    NORBUPRENORPHINE 03/06/2024 Negative  10 ng/ml ng/ml Final    AMOBARBITAL 03/06/2024 Negative  100 ng/ml ng/ml Final    BUTABARBITAL 03/06/2024 Negative  100 ng/ml ng/ml Final    BUTALBITAL 03/06/2024 Negative  100 ng/ml ng/ml Final    PHENOBARBITAL 03/06/2024 Negative  100 ng/ml ng/ml Final    SECOBARBITAL 03/06/2024 Negative  100 ng/ml ng/ml Final    ALPRAZOLAM 03/06/2024 Negative  50 ng/ml ng/ml Final    ALPHA-HYDROXYALPRAZOLAM 03/06/2024 Negative  50 ng/ml ng/ml Final    CLONAZEPAM 03/06/2024 Negative  50 ng/ml ng/ml Final    7- AMINOCLONAZEPAM 03/06/2024 Negative  50 ng/ml ng/ml Final    DIAZEPAM 03/06/2024 Negative  50 ng/ml ng/ml Final    NORDIAZEPAM 03/06/2024 Negative  50 ng/ml ng/ml Final    LORAZEPAM 03/06/2024 Negative  100 ng/ml ng/ml Final    MIDAZOLAM 03/06/2024 Negative  50 ng/ml ng/ml Final    ALPHA-HYDROXYMIDAZOLAM 03/06/2024 Negative  50 ng/ml ng/ml Final    OXAZEPAM 03/06/2024 Negative  50 ng/ml ng/ml Final    TEMAZEPAM 03/06/2024 Negative  50 ng/ml ng/ml Final    ETG 03/06/2024 Negative  200 ng/ml ng/ml Final    ETS 03/06/2024 Negative  50 ng/ml ng/ml Final    BENZOYLECGONINE 03/06/2024 Negative  100 ng/ml ng/ml Final    6-NELIA 03/06/2024 Negative  10 ng/ml ng/ml Final    MDMA 03/06/2024 Negative  100 ng/ml ng/ml Final    PCP 03/06/2024 Negative  20 ng/ml ng/ml Final    DELTA-9-THC 03/06/2024 Negative  20 ng/ml ng/ml Final    AMPHETAMINE 03/06/2024 Negative   250 ng/ml ng/ml Final    METHAMPHETAMINE 03/06/2024 Negative  100 ng/ml ng/ml Final    METHYLPHENIDATE 03/06/2024 Negative  10 ng/ml ng/ml Final    PHENTERMINE 03/06/2024 Negative  100 ng/ml ng/ml Final    RITALINIC ACID 03/06/2024 Negative  50 ng/ml ng/ml Final    CARISOPRODOL 03/06/2024 Negative  100 ng/ml ng/ml Final    GABAPENTIN 03/06/2024 Negative  500 ng/ml ng/ml Final    PREGABALIN 03/06/2024 Negative  250 ng/ml ng/ml Final    ZOLPIDEM 03/06/2024 Negative  2 ng/ml ng/ml Final    CARBOXYZOLPIDEM 03/06/2024 Negative  10 ng/ml ng/ml Final   Lab on 02/27/2024   Component Date Value Ref Range Status    THC, Screen, Urine 02/27/2024 Positive (A)  Negative Final    Phencyclidine (PCP), Urine 02/27/2024 Negative  Negative Final    Cocaine Screen, Urine 02/27/2024 Negative  Negative Final    Methamphetamine, Ur 02/27/2024 Negative  Negative Final    Opiate Screen 02/27/2024 Negative  Negative Final    Amphetamine Screen, Urine 02/27/2024 Negative  Negative Final    Benzodiazepine Screen, Urine 02/27/2024 Negative  Negative Final    Tricyclic Antidepressants Screen 02/27/2024 Negative  Negative Final    Methadone Screen, Urine 02/27/2024 Negative  Negative Final    Barbiturates Screen, Urine 02/27/2024 Negative  Negative Final    Oxycodone Screen, Urine 02/27/2024 Negative  Negative Final    Buprenorphine, Screen, Urine 02/27/2024 Negative  Negative Final    PH 02/27/2024 6.5  4.5 - 9 Final    CREATININE 02/27/2024 270.5  20 - 300 mg/dL mg/dL Final    SPECIFIC GRAVITY 02/27/2024 1.027  1.003 - 1.035 Final    CODEINE 02/27/2024 Negative  50 ng/ml ng/ml Final    HYDROCODONE 02/27/2024 Negative  50 ng/ml ng/ml Final    NORHYDROCODONE 02/27/2024 Negative  50 ng/ml ng/ml Final    HYDROMORPHONE 02/27/2024 Negative  50 ng/ml ng/ml Final    MORPHINE 02/27/2024 Negative  50 ng/ml ng/ml Final    FENTANYL 02/27/2024 Negative  2 ng/ml ng/ml Final    NORFENTANYL 02/27/2024 Negative  10 ng/ml ng/ml Final    METHADONE 02/27/2024  Negative  25 ng/ml ng/ml Final    EDDP 02/27/2024 Negative  50 ng/ml ng/ml Final    OXYCODONE 02/27/2024 Negative  50 ng/ml ng/ml Final    NOROXYCODONE 02/27/2024 Negative  50 ng/ml ng/ml Final    OXYMORPHONE 02/27/2024 Negative  50 ng/ml ng/ml Final    TAPENTADOL 02/27/2024 Negative  50 ng/ml ng/ml Final    TRAMADOL 02/27/2024 Negative  50 ng/ml ng/ml Final    BUPRENORPHINE 02/27/2024 Negative  7.5 ng/ml ng/ml Final    NORBUPRENORPHINE 02/27/2024 Negative  10 ng/ml ng/ml Final    AMOBARBITAL 02/27/2024 Negative  100 ng/ml ng/ml Final    BUTABARBITAL 02/27/2024 Negative  100 ng/ml ng/ml Final    BUTALBITAL 02/27/2024 Negative  100 ng/ml ng/ml Final    PHENOBARBITAL 02/27/2024 Negative  100 ng/ml ng/ml Final    SECOBARBITAL 02/27/2024 Negative  100 ng/ml ng/ml Final    ALPRAZOLAM 02/27/2024 Negative  50 ng/ml ng/ml Final    ALPHA-HYDROXYALPRAZOLAM 02/27/2024 Negative  50 ng/ml ng/ml Final    CLONAZEPAM 02/27/2024 Negative  50 ng/ml ng/ml Final    7- AMINOCLONAZEPAM 02/27/2024 Negative  50 ng/ml ng/ml Final    DIAZEPAM 02/27/2024 Negative  50 ng/ml ng/ml Final    NORDIAZEPAM 02/27/2024 Negative  50 ng/ml ng/ml Final    LORAZEPAM 02/27/2024 Negative  100 ng/ml ng/ml Final    MIDAZOLAM 02/27/2024 Negative  50 ng/ml ng/ml Final    ALPHA-HYDROXYMIDAZOLAM 02/27/2024 Negative  50 ng/ml ng/ml Final    OXAZEPAM 02/27/2024 Negative  50 ng/ml ng/ml Final    TEMAZEPAM 02/27/2024 Negative  50 ng/ml ng/ml Final    ETG 02/27/2024 Negative  200 ng/ml ng/ml Final    ETS 02/27/2024 Negative  50 ng/ml ng/ml Final    BENZOYLECGONINE 02/27/2024 Negative  100 ng/ml ng/ml Final    6-NELIA 02/27/2024 Negative  10 ng/ml ng/ml Final    MDMA 02/27/2024 Negative  100 ng/ml ng/ml Final    PCP 02/27/2024 Negative  20 ng/ml ng/ml Final    DELTA-9-THC 02/27/2024 62 (C)  20 ng/ml ng/ml Final    AMPHETAMINE 02/27/2024 Negative  250 ng/ml ng/ml Final    METHAMPHETAMINE 02/27/2024 Negative  100 ng/ml ng/ml Final    METHYLPHENIDATE 02/27/2024 Negative  10  ng/ml ng/ml Final    PHENTERMINE 02/27/2024 Negative  100 ng/ml ng/ml Final    RITALINIC ACID 02/27/2024 Negative  50 ng/ml ng/ml Final    CARISOPRODOL 02/27/2024 Negative  100 ng/ml ng/ml Final    GABAPENTIN 02/27/2024 Negative  500 ng/ml ng/ml Final    PREGABALIN 02/27/2024 Negative  250 ng/ml ng/ml Final    ZOLPIDEM 02/27/2024 Negative  2 ng/ml ng/ml Final    CARBOXYZOLPIDEM 02/27/2024 Negative  10 ng/ml ng/ml Final       Mental Status Exam  Hygiene:  good  Dress: casual  Attitude: cooperative and agreeable   Motor Activity: appropriate  Eye Contact:  good  Speech: regular rate and rhythm   Mood:  calm and cooperative  Affect:  Appropriate  Thought Processes:  Linear  Thought Content:  Normal  Suicidal Thoughts:  denies  Homicidal Thoughts:  denies  Crisis Safety Plan: Safety plan has been discussed.   Hallucinations:  Unknown to clinician.   Reliability: fair  Insight: fair  Judgement: fair  Impulse Control: fair    Recovery/spiritual support group attendance: No.      Progress toward goal: Not at goal    Prognosis: Fair with Ongoing Treatment     Self-reported number of days sober: Patient on check in form reported Alc 3 months 21 days/ Weed 1 month 17 days.     Patient will contact this office, call 911 or present to the nearest emergency room should suicidal or homicidal ideations occur.    Impression/Formulation:    ICD-10-CM ICD-9-CM   1. Alcohol use disorder, severe, dependence  F10.20 303.90       Clinical Maneuvering/Interventions: Therapist utilized a person-centered approach to build rapport with group member. Therapist implemented motivational interviewing techniques to assist client with exploring and resolving ambivalence associated with commitment to change behaviors related to substance use and addiction. Therapist applied cognitive behavioral strategies to facilitate identification of maladaptive patterns of thinking and behavior that contribute to client's risk for continued substance use and  relapse. Therapist employed group interaction activities to build rapport among group members, promote sobriety, and emphasize relapse prevention. Therapist promoted safe nonjudgmental environment by providing group members with unconditional positive regard and encouraging group members to comply with group rules and guidelines. Therapist assisted group member with identifying and implementing healthier coping strategies.      Plan:  Continue [facility] IOP Phase I   Aftercare:  [facility] Phase II  Program Assignments:  Personal recovery plan, relapse prevention plan, attendance of recovery support group meetings, exploration of sponsorship, drug/alcohol screens.     Stan Powell LCSW  3/26/2024  13:27 EDT

## 2024-03-26 NOTE — PROGRESS NOTES
[facility] IOP GROUP     Date: 03/20/2024  Name: Paula Linares    Time In: 1800   Time Out: 2058     Number of participants: 12    IOP GROUP NOTE     Data: 3 hour IOP group therapy session (Check-ins, Coping Skills, Relapse Prevention)     Check Ins: Therapist continued facilitation of rapport building strategies between group members. Therapist asked that each patient check in with home life and recovery efforts and identify triggers, cravings, and high risk situations that arise between group sessions. Therapist provided empathy and support during group session.     Session Content/Coping Skills: [employer] student, attended for first part of session. Introductions completed. Clinician discussed upcoming resource of [facility]. Clinician also advised group members to call office if they need to schedule with LULU Guallpa (MAT provider) or if they have medication concerns. Article “Building Your Resume in Recovery” reviewed (retrieved from [employer]). Article “Handling a Job Interview in Recovery” reviewed (Retrieved from [employer]).  Example resumes and cover letters provided. Clinician educated group members on how to access a resume template on [employer]. Resume tips from [employer] provided. The [employer] educational material was provided by a group member and shared with group members.    Response: Patient attended class in person. Patient participated in completion of check in form. Patient on check in form answered no to question of “currently or since your last group meeting, have you had any suicidal thoughts or plan or intent to hurt yourself”, and patient also answered no on check in form to question of “currently or since your last group meeting, have you had any homicidal thoughts or plan or intent to hurt others”.     Personal Assessment 0-10 Scale (0-none, 10-high)    Anxiety:  0   Depression:  0   Cravings: 1     Assessment:     ..  Lab on 03/13/2024    Component Date Value Ref Range Status    THC, Screen, Urine 03/13/2024 Positive (A)  Negative Final    Phencyclidine (PCP), Urine 03/13/2024 Negative  Negative Final    Cocaine Screen, Urine 03/13/2024 Negative  Negative Final    Methamphetamine, Ur 03/13/2024 Negative  Negative Final    Opiate Screen 03/13/2024 Negative  Negative Final    Amphetamine Screen, Urine 03/13/2024 Negative  Negative Final    Benzodiazepine Screen, Urine 03/13/2024 Negative  Negative Final    Tricyclic Antidepressants Screen 03/13/2024 Negative  Negative Final    Methadone Screen, Urine 03/13/2024 Negative  Negative Final    Barbiturates Screen, Urine 03/13/2024 Negative  Negative Final    Oxycodone Screen, Urine 03/13/2024 Negative  Negative Final    Buprenorphine, Screen, Urine 03/13/2024 Negative  Negative Final   Lab on 03/06/2024   Component Date Value Ref Range Status    THC, Screen, Urine 03/06/2024 Negative  Negative Final    Phencyclidine (PCP), Urine 03/06/2024 Negative  Negative Final    Cocaine Screen, Urine 03/06/2024 Negative  Negative Final    Methamphetamine, Ur 03/06/2024 Negative  Negative Final    Opiate Screen 03/06/2024 Negative  Negative Final    Amphetamine Screen, Urine 03/06/2024 Negative  Negative Final    Benzodiazepine Screen, Urine 03/06/2024 Negative  Negative Final    Tricyclic Antidepressants Screen 03/06/2024 Negative  Negative Final    Methadone Screen, Urine 03/06/2024 Negative  Negative Final    Barbiturates Screen, Urine 03/06/2024 Negative  Negative Final    Oxycodone Screen, Urine 03/06/2024 Negative  Negative Final    Buprenorphine, Screen, Urine 03/06/2024 Negative  Negative Final    PH 03/06/2024 7.6  4.5 - 9 Final    CREATININE 03/06/2024 20.2  20 - 300 mg/dL mg/dL Final    SPECIFIC GRAVITY 03/06/2024 1.003  1.003 - 1.035 Final    CODEINE 03/06/2024 Negative  50 ng/ml ng/ml Final    HYDROCODONE 03/06/2024 Negative  50 ng/ml ng/ml Final    NORHYDROCODONE 03/06/2024 Negative  50 ng/ml ng/ml Final     HYDROMORPHONE 03/06/2024 Negative  50 ng/ml ng/ml Final    MORPHINE 03/06/2024 Negative  50 ng/ml ng/ml Final    FENTANYL 03/06/2024 Negative  2 ng/ml ng/ml Final    NORFENTANYL 03/06/2024 Negative  10 ng/ml ng/ml Final    METHADONE 03/06/2024 Negative  25 ng/ml ng/ml Final    EDDP 03/06/2024 Negative  50 ng/ml ng/ml Final    OXYCODONE 03/06/2024 Negative  50 ng/ml ng/ml Final    NOROXYCODONE 03/06/2024 Negative  50 ng/ml ng/ml Final    OXYMORPHONE 03/06/2024 Negative  50 ng/ml ng/ml Final    TAPENTADOL 03/06/2024 Negative  50 ng/ml ng/ml Final    TRAMADOL 03/06/2024 Negative  50 ng/ml ng/ml Final    BUPRENORPHINE 03/06/2024 Negative  7.5 ng/ml ng/ml Final    NORBUPRENORPHINE 03/06/2024 Negative  10 ng/ml ng/ml Final    AMOBARBITAL 03/06/2024 Negative  100 ng/ml ng/ml Final    BUTABARBITAL 03/06/2024 Negative  100 ng/ml ng/ml Final    BUTALBITAL 03/06/2024 Negative  100 ng/ml ng/ml Final    PHENOBARBITAL 03/06/2024 Negative  100 ng/ml ng/ml Final    SECOBARBITAL 03/06/2024 Negative  100 ng/ml ng/ml Final    ALPRAZOLAM 03/06/2024 Negative  50 ng/ml ng/ml Final    ALPHA-HYDROXYALPRAZOLAM 03/06/2024 Negative  50 ng/ml ng/ml Final    CLONAZEPAM 03/06/2024 Negative  50 ng/ml ng/ml Final    7- AMINOCLONAZEPAM 03/06/2024 Negative  50 ng/ml ng/ml Final    DIAZEPAM 03/06/2024 Negative  50 ng/ml ng/ml Final    NORDIAZEPAM 03/06/2024 Negative  50 ng/ml ng/ml Final    LORAZEPAM 03/06/2024 Negative  100 ng/ml ng/ml Final    MIDAZOLAM 03/06/2024 Negative  50 ng/ml ng/ml Final    ALPHA-HYDROXYMIDAZOLAM 03/06/2024 Negative  50 ng/ml ng/ml Final    OXAZEPAM 03/06/2024 Negative  50 ng/ml ng/ml Final    TEMAZEPAM 03/06/2024 Negative  50 ng/ml ng/ml Final    ETG 03/06/2024 Negative  200 ng/ml ng/ml Final    ETS 03/06/2024 Negative  50 ng/ml ng/ml Final    BENZOYLECGONINE 03/06/2024 Negative  100 ng/ml ng/ml Final    6-NELIA 03/06/2024 Negative  10 ng/ml ng/ml Final    MDMA 03/06/2024 Negative  100 ng/ml ng/ml Final    PCP 03/06/2024  Negative  20 ng/ml ng/ml Final    DELTA-9-THC 03/06/2024 Negative  20 ng/ml ng/ml Final    AMPHETAMINE 03/06/2024 Negative  250 ng/ml ng/ml Final    METHAMPHETAMINE 03/06/2024 Negative  100 ng/ml ng/ml Final    METHYLPHENIDATE 03/06/2024 Negative  10 ng/ml ng/ml Final    PHENTERMINE 03/06/2024 Negative  100 ng/ml ng/ml Final    RITALINIC ACID 03/06/2024 Negative  50 ng/ml ng/ml Final    CARISOPRODOL 03/06/2024 Negative  100 ng/ml ng/ml Final    GABAPENTIN 03/06/2024 Negative  500 ng/ml ng/ml Final    PREGABALIN 03/06/2024 Negative  250 ng/ml ng/ml Final    ZOLPIDEM 03/06/2024 Negative  2 ng/ml ng/ml Final    CARBOXYZOLPIDEM 03/06/2024 Negative  10 ng/ml ng/ml Final       Mental Status Exam  Hygiene:  good  Dress: casual  Attitude: cooperative and agreeable   Motor Activity: appropriate  Eye Contact:  good  Speech: regular rate and rhythm   Mood:  calm and cooperative  Affect:  Appropriate  Thought Processes:  Linear  Thought Content:  Normal  Suicidal Thoughts:  denies  Homicidal Thoughts:  denies  Crisis Safety Plan: Safety plan has been discussed.   Hallucinations:  Unknown to clinician.   Reliability: fair  Insight: fair  Judgement: fair  Impulse Control: fair    Recovery/spiritual support group attendance: No.     Progress toward goal: Not at goal    Prognosis: Fair with Ongoing Treatment     Self-reported number of days sober: Patient on check in form reported Alc 3 months 23 days/ Weed 1 month 19 days.     Patient will contact this office, call 911 or present to the nearest emergency room should suicidal or homicidal ideations occur.    Impression/Formulation:    ICD-10-CM ICD-9-CM   1. Alcohol use disorder, severe, dependence  F10.20 303.90       Clinical Maneuvering/Interventions: Therapist utilized a person-centered approach to build rapport with group member. Therapist implemented motivational interviewing techniques to assist client with exploring and resolving ambivalence associated with commitment to  change behaviors related to substance use and addiction. Therapist applied cognitive behavioral strategies to facilitate identification of maladaptive patterns of thinking and behavior that contribute to client's risk for continued substance use and relapse. Therapist employed group interaction activities to build rapport among group members, promote sobriety, and emphasize relapse prevention. Therapist promoted safe nonjudgmental environment by providing group members with unconditional positive regard and encouraging group members to comply with group rules and guidelines. Therapist assisted group member with identifying and implementing healthier coping strategies.      Plan:  Continue [facility] IOP Phase I   Aftercare:  [facility] Phase II  Program Assignments:  Personal recovery plan, relapse prevention plan, attendance of recovery support group meetings, exploration of sponsorship, drug/alcohol screens.     Stan Powell LCSW  3/26/2024  17:26 EDT

## 2024-03-27 ENCOUNTER — LAB (OUTPATIENT)
Dept: LAB | Facility: HOSPITAL | Age: 29
End: 2024-03-27
Payer: COMMERCIAL

## 2024-03-27 ENCOUNTER — OFFICE VISIT (OUTPATIENT)
Dept: PSYCHIATRY | Facility: HOSPITAL | Age: 29
End: 2024-03-27
Payer: COMMERCIAL

## 2024-03-27 DIAGNOSIS — F10.20 ALCOHOL USE DISORDER, SEVERE, DEPENDENCE: Primary | ICD-10-CM

## 2024-03-27 DIAGNOSIS — F10.20 ALCOHOL USE DISORDER, SEVERE, DEPENDENCE: ICD-10-CM

## 2024-03-27 PROCEDURE — 80306 DRUG TEST PRSMV INSTRMNT: CPT

## 2024-03-27 PROCEDURE — H0015 ALCOHOL AND/OR DRUG SERVICES: HCPCS | Performed by: NURSE PRACTITIONER

## 2024-03-27 NOTE — PROGRESS NOTES
CD IOP GROUP     Date: 03/25/2024  Name: Paula Linares    Time In: 1800   Time Out: 2100     Number of participants: 16    IOP GROUP NOTE     Data: 3 hour IOP group therapy session (Check-ins, Coping Skills, Relapse Prevention)     Check Ins: Therapist continued facilitation of rapport building strategies between group members. Therapist asked that each patient check in with home life and recovery efforts and identify triggers, cravings, and high risk situations that arise between group sessions. Therapist provided empathy and support during group session.     Session Content/Coping Skills: [employer] student, attended session. Kaia, , and Juan [facility] Liaison also attended for first part of session. Introductions completed. Check ins completed by group members.  shared Just for Today reading. Jazmin reviewed the types of denial discussed in the previous session. [employer] video Addiction: Tomorrow Is Going To Be Better Attila Moon's Story viewed ([employer]). Group members were asked to identify types of denial displayed in Attila's story.     Response: Patient attended class in person. Patient participated in completion of check in form. Patient on check in form answered no to question of “currently or since your last group meeting, have you had any suicidal thoughts or plan or intent to hurt yourself”, and patient also answered no on check in form to question of “currently or since your last group meeting, have you had any homicidal thoughts or plan or intent to hurt others”.     Personal Assessment 0-10 Scale (0-none, 10-high)    Anxiety:  2   Depression:  0   Cravings: 3     Assessment:     ..  Lab on 03/21/2024   Component Date Value Ref Range Status    THC, Screen, Urine 03/21/2024 Positive (A)  Negative Final    Phencyclidine (PCP), Urine 03/21/2024 Negative  Negative Final    Cocaine Screen,  Urine 03/21/2024 Negative  Negative Final    Methamphetamine, Ur 03/21/2024 Negative  Negative Final    Opiate Screen 03/21/2024 Negative  Negative Final    Amphetamine Screen, Urine 03/21/2024 Negative  Negative Final    Benzodiazepine Screen, Urine 03/21/2024 Negative  Negative Final    Tricyclic Antidepressants Screen 03/21/2024 Negative  Negative Final    Methadone Screen, Urine 03/21/2024 Negative  Negative Final    Barbiturates Screen, Urine 03/21/2024 Negative  Negative Final    Oxycodone Screen, Urine 03/21/2024 Negative  Negative Final    Buprenorphine, Screen, Urine 03/21/2024 Negative  Negative Final   Lab on 03/13/2024   Component Date Value Ref Range Status    THC, Screen, Urine 03/13/2024 Positive (A)  Negative Final    Phencyclidine (PCP), Urine 03/13/2024 Negative  Negative Final    Cocaine Screen, Urine 03/13/2024 Negative  Negative Final    Methamphetamine, Ur 03/13/2024 Negative  Negative Final    Opiate Screen 03/13/2024 Negative  Negative Final    Amphetamine Screen, Urine 03/13/2024 Negative  Negative Final    Benzodiazepine Screen, Urine 03/13/2024 Negative  Negative Final    Tricyclic Antidepressants Screen 03/13/2024 Negative  Negative Final    Methadone Screen, Urine 03/13/2024 Negative  Negative Final    Barbiturates Screen, Urine 03/13/2024 Negative  Negative Final    Oxycodone Screen, Urine 03/13/2024 Negative  Negative Final    Buprenorphine, Screen, Urine 03/13/2024 Negative  Negative Final   Lab on 03/06/2024   Component Date Value Ref Range Status    THC, Screen, Urine 03/06/2024 Negative  Negative Final    Phencyclidine (PCP), Urine 03/06/2024 Negative  Negative Final    Cocaine Screen, Urine 03/06/2024 Negative  Negative Final    Methamphetamine, Ur 03/06/2024 Negative  Negative Final    Opiate Screen 03/06/2024 Negative  Negative Final    Amphetamine Screen, Urine 03/06/2024 Negative  Negative Final    Benzodiazepine Screen, Urine 03/06/2024 Negative  Negative Final    Tricyclic  Antidepressants Screen 03/06/2024 Negative  Negative Final    Methadone Screen, Urine 03/06/2024 Negative  Negative Final    Barbiturates Screen, Urine 03/06/2024 Negative  Negative Final    Oxycodone Screen, Urine 03/06/2024 Negative  Negative Final    Buprenorphine, Screen, Urine 03/06/2024 Negative  Negative Final    PH 03/06/2024 7.6  4.5 - 9 Final    CREATININE 03/06/2024 20.2  20 - 300 mg/dL mg/dL Final    SPECIFIC GRAVITY 03/06/2024 1.003  1.003 - 1.035 Final    CODEINE 03/06/2024 Negative  50 ng/ml ng/ml Final    HYDROCODONE 03/06/2024 Negative  50 ng/ml ng/ml Final    NORHYDROCODONE 03/06/2024 Negative  50 ng/ml ng/ml Final    HYDROMORPHONE 03/06/2024 Negative  50 ng/ml ng/ml Final    MORPHINE 03/06/2024 Negative  50 ng/ml ng/ml Final    FENTANYL 03/06/2024 Negative  2 ng/ml ng/ml Final    NORFENTANYL 03/06/2024 Negative  10 ng/ml ng/ml Final    METHADONE 03/06/2024 Negative  25 ng/ml ng/ml Final    EDDP 03/06/2024 Negative  50 ng/ml ng/ml Final    OXYCODONE 03/06/2024 Negative  50 ng/ml ng/ml Final    NOROXYCODONE 03/06/2024 Negative  50 ng/ml ng/ml Final    OXYMORPHONE 03/06/2024 Negative  50 ng/ml ng/ml Final    TAPENTADOL 03/06/2024 Negative  50 ng/ml ng/ml Final    TRAMADOL 03/06/2024 Negative  50 ng/ml ng/ml Final    BUPRENORPHINE 03/06/2024 Negative  7.5 ng/ml ng/ml Final    NORBUPRENORPHINE 03/06/2024 Negative  10 ng/ml ng/ml Final    AMOBARBITAL 03/06/2024 Negative  100 ng/ml ng/ml Final    BUTABARBITAL 03/06/2024 Negative  100 ng/ml ng/ml Final    BUTALBITAL 03/06/2024 Negative  100 ng/ml ng/ml Final    PHENOBARBITAL 03/06/2024 Negative  100 ng/ml ng/ml Final    SECOBARBITAL 03/06/2024 Negative  100 ng/ml ng/ml Final    ALPRAZOLAM 03/06/2024 Negative  50 ng/ml ng/ml Final    ALPHA-HYDROXYALPRAZOLAM 03/06/2024 Negative  50 ng/ml ng/ml Final    CLONAZEPAM 03/06/2024 Negative  50 ng/ml ng/ml Final    7- AMINOCLONAZEPAM 03/06/2024 Negative  50 ng/ml ng/ml Final    DIAZEPAM 03/06/2024 Negative  50  ng/ml ng/ml Final    NORDIAZEPAM 03/06/2024 Negative  50 ng/ml ng/ml Final    LORAZEPAM 03/06/2024 Negative  100 ng/ml ng/ml Final    MIDAZOLAM 03/06/2024 Negative  50 ng/ml ng/ml Final    ALPHA-HYDROXYMIDAZOLAM 03/06/2024 Negative  50 ng/ml ng/ml Final    OXAZEPAM 03/06/2024 Negative  50 ng/ml ng/ml Final    TEMAZEPAM 03/06/2024 Negative  50 ng/ml ng/ml Final    ETG 03/06/2024 Negative  200 ng/ml ng/ml Final    ETS 03/06/2024 Negative  50 ng/ml ng/ml Final    BENZOYLECGONINE 03/06/2024 Negative  100 ng/ml ng/ml Final    6-NELIA 03/06/2024 Negative  10 ng/ml ng/ml Final    MDMA 03/06/2024 Negative  100 ng/ml ng/ml Final    PCP 03/06/2024 Negative  20 ng/ml ng/ml Final    DELTA-9-THC 03/06/2024 Negative  20 ng/ml ng/ml Final    AMPHETAMINE 03/06/2024 Negative  250 ng/ml ng/ml Final    METHAMPHETAMINE 03/06/2024 Negative  100 ng/ml ng/ml Final    METHYLPHENIDATE 03/06/2024 Negative  10 ng/ml ng/ml Final    PHENTERMINE 03/06/2024 Negative  100 ng/ml ng/ml Final    RITALINIC ACID 03/06/2024 Negative  50 ng/ml ng/ml Final    CARISOPRODOL 03/06/2024 Negative  100 ng/ml ng/ml Final    GABAPENTIN 03/06/2024 Negative  500 ng/ml ng/ml Final    PREGABALIN 03/06/2024 Negative  250 ng/ml ng/ml Final    ZOLPIDEM 03/06/2024 Negative  2 ng/ml ng/ml Final    CARBOXYZOLPIDEM 03/06/2024 Negative  10 ng/ml ng/ml Final       Mental Status Exam  Hygiene:  good  Dress: casual  Attitude: cooperative and agreeable   Motor Activity: appropriate  Eye Contact:  good  Speech: regular rate and rhythm   Mood:  calm and cooperative  Affect:  Appropriate  Thought Processes:  Linear  Thought Content:  Normal  Suicidal Thoughts:  denies  Homicidal Thoughts:  denies  Crisis Safety Plan: Safety plan has been discussed.   Hallucinations:  Unknown to clinician.   Reliability: fair  Insight: fair  Judgement: fair  Impulse Control: fair    Recovery/spiritual support group attendance: No.     Progress toward goal: Not at goal    Prognosis: Fair with Ongoing  Treatment     Self-reported number of days sober: Patient on check in form reported Alc- 120 days/ Weed 59 days.     Patient will contact this office, call 911 or present to the nearest emergency room should suicidal or homicidal ideations occur.    Impression/Formulation:    ICD-10-CM ICD-9-CM   1. Alcohol use disorder, severe, dependence  F10.20 303.90       Clinical Maneuvering/Interventions: Therapist utilized a person-centered approach to build rapport with group member. Therapist implemented motivational interviewing techniques to assist client with exploring and resolving ambivalence associated with commitment to change behaviors related to substance use and addiction. Therapist applied cognitive behavioral strategies to facilitate identification of maladaptive patterns of thinking and behavior that contribute to client's risk for continued substance use and relapse. Therapist employed group interaction activities to build rapport among group members, promote sobriety, and emphasize relapse prevention. Therapist promoted safe nonjudgmental environment by providing group members with unconditional positive regard and encouraging group members to comply with group rules and guidelines. Therapist assisted group member with identifying and implementing healthier coping strategies.      Plan:  Continue [facility] IOP Phase I   Aftercare:  [facility] Phase II  Program Assignments:  Personal recovery plan, relapse prevention plan, attendance of recovery support group meetings, exploration of sponsorship, drug/alcohol screens.     Stan Powell LCSW  3/26/2024  21:20 EDT

## 2024-03-28 ENCOUNTER — OFFICE VISIT (OUTPATIENT)
Dept: PSYCHIATRY | Facility: HOSPITAL | Age: 29
End: 2024-03-28
Payer: COMMERCIAL

## 2024-03-28 DIAGNOSIS — F10.20 ALCOHOL USE DISORDER, SEVERE, DEPENDENCE: Primary | ICD-10-CM

## 2024-03-28 LAB — REF LAB TEST METHOD: NORMAL

## 2024-03-28 PROCEDURE — H0015 ALCOHOL AND/OR DRUG SERVICES: HCPCS | Performed by: NURSE PRACTITIONER

## 2024-03-31 NOTE — PROGRESS NOTES
CD IOP GROUP     Date: 03/28/2024  Name: Paula Linares    Time In: 1800   Time Out: Approximately 2050      Number of participants: 13    IOP GROUP NOTE     Data: 3 hour IOP group therapy session (Check-ins, Coping Skills, Relapse Prevention)     Check Ins: Therapist continued facilitation of rapport building strategies between group members. Therapist asked that each patient check in with home life and recovery efforts and identify triggers, cravings, and high risk situations that arise between group sessions. Therapist provided empathy and support during group session.     Session Content/Coping Skills: Kaia  joined for first part of meeting. Jazmin, [employer] student, attended session. Check ins completed by group members. Matrix Model External Triggers and Internal Triggers psychoeducational material reviewed and discussed. Clinician discussed with group members identifying, avoiding, coping, and talking about triggers. Clinician also discussed the trigger, thought, craving, use sequence. IOP Jeopardy group activity completed.     Response: Patient attended class in person. Patient participated in completion of check in form. Patient on check in form answered no to question of “currently or since your last group meeting, have you had any suicidal thoughts or plan or intent to hurt yourself”, and patient also answered no on check in form to question of “currently or since your last group meeting, have you had any homicidal thoughts or plan or intent to hurt others”.     Personal Assessment 0-10 Scale (0-none, 10-high)    Anxiety:  0   Depression:  0   Cravings: 0     Assessment:     ..  Lab on 03/27/2024   Component Date Value Ref Range Status    THC, Screen, Urine 03/27/2024 Negative  Negative Final    Phencyclidine (PCP), Urine 03/27/2024 Negative  Negative Final    Cocaine Screen, Urine 03/27/2024 Negative  Negative Final    Methamphetamine, Ur 03/27/2024 Negative  Negative Final     Opiate Screen 03/27/2024 Negative  Negative Final    Amphetamine Screen, Urine 03/27/2024 Negative  Negative Final    Benzodiazepine Screen, Urine 03/27/2024 Negative  Negative Final    Tricyclic Antidepressants Screen 03/27/2024 Negative  Negative Final    Methadone Screen, Urine 03/27/2024 Negative  Negative Final    Barbiturates Screen, Urine 03/27/2024 Negative  Negative Final    Oxycodone Screen, Urine 03/27/2024 Negative  Negative Final    Buprenorphine, Screen, Urine 03/27/2024 Negative  Negative Final   Lab on 03/21/2024   Component Date Value Ref Range Status    Reference Lab Report 03/21/2024 See scanned report   Final    THC, Screen, Urine 03/21/2024 Positive (A)  Negative Final    Phencyclidine (PCP), Urine 03/21/2024 Negative  Negative Final    Cocaine Screen, Urine 03/21/2024 Negative  Negative Final    Methamphetamine, Ur 03/21/2024 Negative  Negative Final    Opiate Screen 03/21/2024 Negative  Negative Final    Amphetamine Screen, Urine 03/21/2024 Negative  Negative Final    Benzodiazepine Screen, Urine 03/21/2024 Negative  Negative Final    Tricyclic Antidepressants Screen 03/21/2024 Negative  Negative Final    Methadone Screen, Urine 03/21/2024 Negative  Negative Final    Barbiturates Screen, Urine 03/21/2024 Negative  Negative Final    Oxycodone Screen, Urine 03/21/2024 Negative  Negative Final    Buprenorphine, Screen, Urine 03/21/2024 Negative  Negative Final   Lab on 03/13/2024   Component Date Value Ref Range Status    THC, Screen, Urine 03/13/2024 Positive (A)  Negative Final    Phencyclidine (PCP), Urine 03/13/2024 Negative  Negative Final    Cocaine Screen, Urine 03/13/2024 Negative  Negative Final    Methamphetamine, Ur 03/13/2024 Negative  Negative Final    Opiate Screen 03/13/2024 Negative  Negative Final    Amphetamine Screen, Urine 03/13/2024 Negative  Negative Final    Benzodiazepine Screen, Urine 03/13/2024 Negative  Negative Final    Tricyclic Antidepressants Screen 03/13/2024  Negative  Negative Final    Methadone Screen, Urine 03/13/2024 Negative  Negative Final    Barbiturates Screen, Urine 03/13/2024 Negative  Negative Final    Oxycodone Screen, Urine 03/13/2024 Negative  Negative Final    Buprenorphine, Screen, Urine 03/13/2024 Negative  Negative Final       Mental Status Exam  Hygiene:  good  Dress: casual  Attitude: cooperative and agreeable   Motor Activity: appropriate  Eye Contact:  good  Speech: regular rate and rhythm   Mood:  calm and cooperative  Affect:  Appropriate  Thought Processes:  Linear  Thought Content:  Normal  Suicidal Thoughts:  denies  Homicidal Thoughts:  denies  Crisis Safety Plan: Safety plan has been discussed.   Hallucinations:  Unknown to clinician.   Reliability: fair  Insight: fair  Judgement: fair  Impulse Control: fair    Recovery/spiritual support group attendance: No.     Progress toward goal: Not at goal    Prognosis: Fair with Ongoing Treatment     Self-reported number of days sober: Patient on check in form reported 62 days, 123 days still no alc.      Patient will contact this office, call 911 or present to the nearest emergency room should suicidal or homicidal ideations occur.    Impression/Formulation:    ICD-10-CM ICD-9-CM   1. Alcohol use disorder, severe, dependence  F10.20 303.90       Clinical Maneuvering/Interventions: Therapist utilized a person-centered approach to build rapport with group member. Therapist implemented motivational interviewing techniques to assist client with exploring and resolving ambivalence associated with commitment to change behaviors related to substance use and addiction. Therapist applied cognitive behavioral strategies to facilitate identification of maladaptive patterns of thinking and behavior that contribute to client's risk for continued substance use and relapse. Therapist employed group interaction activities to build rapport among group members, promote sobriety, and emphasize relapse prevention.  Therapist promoted safe nonjudgmental environment by providing group members with unconditional positive regard and encouraging group members to comply with group rules and guidelines. Therapist assisted group member with identifying and implementing healthier coping strategies.      Plan:  Continue [facility] IOP Phase I   Aftercare:  [facility] Phase II  Program Assignments:  Personal recovery plan, relapse prevention plan, attendance of recovery support group meetings, exploration of sponsorship, drug/alcohol screens.     Stan Powell LCSW  3/31/2024  08:45 EDT

## 2024-03-31 NOTE — PROGRESS NOTES
CD IOP GROUP     Date: 03/27/2024  Name: Paula Linares    Time In: Patient arrived to group at approximately 06:02 PM.   Time Out: Approximately 2045      Number of participants: 15    IOP GROUP NOTE     Data: 3 hour IOP group therapy session (Check-ins, Coping Skills, Relapse Prevention)     Check Ins: Therapist continued facilitation of rapport building strategies between group members. Therapist asked that each patient check in with home life and recovery efforts and identify triggers, cravings, and high risk situations that arise between group sessions. Therapist provided empathy and support during group session.     Session Content/Coping Skills: Juan [facility] Liaison and Kaia , joined for first part of meeting. Check ins completed by group members. Clinician discussed pink cloud versus the wall stages of recovery. Triggers explored with group members. Matrix Model External Trigger psychoeducational material reviewed and discussed. [place] activity completed by group members. Clinician processed activity with group and discussed the importance of teamwork.     Response: Patient attended class in person. Patient participated in completion of check in form. Patient on check in form answered no to question of “currently or since your last group meeting, have you had any suicidal thoughts or plan or intent to hurt yourself”, and patient also answered no on check in form to question of “currently or since your last group meeting, have you had any homicidal thoughts or plan or intent to hurt others”.     Personal Assessment 0-10 Scale (0-none, 10-high)    Anxiety:  0  Depression:  0   Cravings: 0     Assessment:     ..  Lab on 03/27/2024   Component Date Value Ref Range Status    THC, Screen, Urine 03/27/2024 Negative  Negative Final    Phencyclidine (PCP), Urine 03/27/2024 Negative  Negative Final    Cocaine Screen, Urine 03/27/2024  Negative  Negative Final    Methamphetamine, Ur 03/27/2024 Negative  Negative Final    Opiate Screen 03/27/2024 Negative  Negative Final    Amphetamine Screen, Urine 03/27/2024 Negative  Negative Final    Benzodiazepine Screen, Urine 03/27/2024 Negative  Negative Final    Tricyclic Antidepressants Screen 03/27/2024 Negative  Negative Final    Methadone Screen, Urine 03/27/2024 Negative  Negative Final    Barbiturates Screen, Urine 03/27/2024 Negative  Negative Final    Oxycodone Screen, Urine 03/27/2024 Negative  Negative Final    Buprenorphine, Screen, Urine 03/27/2024 Negative  Negative Final   Lab on 03/21/2024   Component Date Value Ref Range Status    Reference Lab Report 03/21/2024 See scanned report   Final    THC, Screen, Urine 03/21/2024 Positive (A)  Negative Final    Phencyclidine (PCP), Urine 03/21/2024 Negative  Negative Final    Cocaine Screen, Urine 03/21/2024 Negative  Negative Final    Methamphetamine, Ur 03/21/2024 Negative  Negative Final    Opiate Screen 03/21/2024 Negative  Negative Final    Amphetamine Screen, Urine 03/21/2024 Negative  Negative Final    Benzodiazepine Screen, Urine 03/21/2024 Negative  Negative Final    Tricyclic Antidepressants Screen 03/21/2024 Negative  Negative Final    Methadone Screen, Urine 03/21/2024 Negative  Negative Final    Barbiturates Screen, Urine 03/21/2024 Negative  Negative Final    Oxycodone Screen, Urine 03/21/2024 Negative  Negative Final    Buprenorphine, Screen, Urine 03/21/2024 Negative  Negative Final   Lab on 03/13/2024   Component Date Value Ref Range Status    THC, Screen, Urine 03/13/2024 Positive (A)  Negative Final    Phencyclidine (PCP), Urine 03/13/2024 Negative  Negative Final    Cocaine Screen, Urine 03/13/2024 Negative  Negative Final    Methamphetamine, Ur 03/13/2024 Negative  Negative Final    Opiate Screen 03/13/2024 Negative  Negative Final    Amphetamine Screen, Urine 03/13/2024 Negative  Negative Final    Benzodiazepine Screen, Urine  03/13/2024 Negative  Negative Final    Tricyclic Antidepressants Screen 03/13/2024 Negative  Negative Final    Methadone Screen, Urine 03/13/2024 Negative  Negative Final    Barbiturates Screen, Urine 03/13/2024 Negative  Negative Final    Oxycodone Screen, Urine 03/13/2024 Negative  Negative Final    Buprenorphine, Screen, Urine 03/13/2024 Negative  Negative Final       Mental Status Exam  Hygiene:  good  Dress: casual  Attitude: cooperative and agreeable   Motor Activity: appropriate  Eye Contact:  good  Speech: regular rate and rhythm   Mood:  calm and cooperative  Affect:  Appropriate  Thought Processes:  Linear  Thought Content:  Normal  Suicidal Thoughts:  denies  Homicidal Thoughts:  denies  Crisis Safety Plan: Safety plan has been discussed.   Hallucinations:  Unknown to clinician.   Reliability: fair  Insight: fair  Judgement: fair  Impulse Control: fair    Recovery/spiritual support group attendance: No.     Progress toward goal: Not at goal    Prognosis: Fair with Ongoing Treatment     Self-reported number of days sober: Patient on check in form reported Alc 122 days/ weed 61 days.     Patient will contact this office, call 911 or present to the nearest emergency room should suicidal or homicidal ideations occur.    Impression/Formulation:    ICD-10-CM ICD-9-CM   1. Alcohol use disorder, severe, dependence  F10.20 303.90       Clinical Maneuvering/Interventions: Therapist utilized a person-centered approach to build rapport with group member. Therapist implemented motivational interviewing techniques to assist client with exploring and resolving ambivalence associated with commitment to change behaviors related to substance use and addiction. Therapist applied cognitive behavioral strategies to facilitate identification of maladaptive patterns of thinking and behavior that contribute to client's risk for continued substance use and relapse. Therapist employed group interaction activities to build rapport  among group members, promote sobriety, and emphasize relapse prevention. Therapist promoted safe nonjudgmental environment by providing group members with unconditional positive regard and encouraging group members to comply with group rules and guidelines. Therapist assisted group member with identifying and implementing healthier coping strategies.      Plan:  Continue [facility] IOP Phase I   Aftercare:  [facility] Phase II  Program Assignments:  Personal recovery plan, relapse prevention plan, attendance of recovery support group meetings, exploration of sponsorship, drug/alcohol screens.     Stan Powell LCSW  3/30/2024  21:59 EDT

## 2024-04-01 ENCOUNTER — OFFICE VISIT (OUTPATIENT)
Dept: PSYCHIATRY | Facility: HOSPITAL | Age: 29
End: 2024-04-01
Payer: COMMERCIAL

## 2024-04-01 DIAGNOSIS — F10.20 ALCOHOL USE DISORDER, SEVERE, DEPENDENCE: Primary | ICD-10-CM

## 2024-04-01 PROCEDURE — H0015 ALCOHOL AND/OR DRUG SERVICES: HCPCS

## 2024-04-03 ENCOUNTER — OFFICE VISIT (OUTPATIENT)
Dept: PSYCHIATRY | Facility: HOSPITAL | Age: 29
End: 2024-04-03
Payer: COMMERCIAL

## 2024-04-03 ENCOUNTER — LAB (OUTPATIENT)
Dept: LAB | Facility: HOSPITAL | Age: 29
End: 2024-04-03
Payer: COMMERCIAL

## 2024-04-03 DIAGNOSIS — F10.20 ALCOHOL USE DISORDER, SEVERE, DEPENDENCE: Primary | ICD-10-CM

## 2024-04-03 DIAGNOSIS — F10.20 ALCOHOL USE DISORDER, SEVERE, DEPENDENCE: ICD-10-CM

## 2024-04-03 PROCEDURE — H0015 ALCOHOL AND/OR DRUG SERVICES: HCPCS

## 2024-04-03 PROCEDURE — 80306 DRUG TEST PRSMV INSTRMNT: CPT

## 2024-04-04 ENCOUNTER — OFFICE VISIT (OUTPATIENT)
Dept: PSYCHIATRY | Facility: HOSPITAL | Age: 29
End: 2024-04-04
Payer: COMMERCIAL

## 2024-04-04 DIAGNOSIS — F10.20 ALCOHOL USE DISORDER, SEVERE, DEPENDENCE: Primary | ICD-10-CM

## 2024-04-04 PROCEDURE — H0015 ALCOHOL AND/OR DRUG SERVICES: HCPCS

## 2024-04-04 NOTE — PROGRESS NOTES
CD IOP GROUP     Date: 04/01/2024  Name: Paula Linares    Time In: 18:00   Time Out: 20:55     Number of participants: 15    IOP GROUP NOTE     Data: 3 hour IOP group therapy session (Check-ins, Coping Skills, Relapse Prevention)     Check Ins: Therapist continued facilitation of rapport building strategies between group members. Therapist asked that each patient check in with home life and recovery efforts and identify triggers, cravings, and high risk situations that arise between group sessions. Therapist provided empathy and support during group session.     Session Content/Coping Skills:  Juan ROMAN , Aime [facility] and [employer] Student joined for first part of meeting. Daphnie Barcenas Therapist with [facility] attended session and provided Communication activity for group to complete.  Check ins completed by group members. Matrix Model RP 17 Importance of Self Care materials reviewed and discussed. Clinician discussed with group members identifying why self care is important in recovery and ways to implement self care in daily planning.  Journaling prompt was given to class as well as self care plan completed.      Response:  Patient attended class in person. Patient participated in completion of check in form. Patient on check in form answered no to question of “currently or since your last group meeting, have you had any suicidal thoughts or plan or intent to hurt yourself”, and patient also answered no on check in form to question of “currently or since your last group meeting, have you had any homicidal thoughts or plan or intent to hurt others”.          Personal Assessment 0-10 Scale (0-none, 10-high)    Anxiety:  6   Depression:  2   Cravings: 4  Motivation: 10     Assessment:     ..  Lab on 03/27/2024   Component Date Value Ref Range Status    THC, Screen, Urine 03/27/2024 Negative  Negative Final    Phencyclidine (PCP), Urine 03/27/2024 Negative  Negative  Final    Cocaine Screen, Urine 03/27/2024 Negative  Negative Final    Methamphetamine, Ur 03/27/2024 Negative  Negative Final    Opiate Screen 03/27/2024 Negative  Negative Final    Amphetamine Screen, Urine 03/27/2024 Negative  Negative Final    Benzodiazepine Screen, Urine 03/27/2024 Negative  Negative Final    Tricyclic Antidepressants Screen 03/27/2024 Negative  Negative Final    Methadone Screen, Urine 03/27/2024 Negative  Negative Final    Barbiturates Screen, Urine 03/27/2024 Negative  Negative Final    Oxycodone Screen, Urine 03/27/2024 Negative  Negative Final    Buprenorphine, Screen, Urine 03/27/2024 Negative  Negative Final   Lab on 03/21/2024   Component Date Value Ref Range Status    Reference Lab Report 03/21/2024 See scanned report   Final    THC, Screen, Urine 03/21/2024 Positive (A)  Negative Final    Phencyclidine (PCP), Urine 03/21/2024 Negative  Negative Final    Cocaine Screen, Urine 03/21/2024 Negative  Negative Final    Methamphetamine, Ur 03/21/2024 Negative  Negative Final    Opiate Screen 03/21/2024 Negative  Negative Final    Amphetamine Screen, Urine 03/21/2024 Negative  Negative Final    Benzodiazepine Screen, Urine 03/21/2024 Negative  Negative Final    Tricyclic Antidepressants Screen 03/21/2024 Negative  Negative Final    Methadone Screen, Urine 03/21/2024 Negative  Negative Final    Barbiturates Screen, Urine 03/21/2024 Negative  Negative Final    Oxycodone Screen, Urine 03/21/2024 Negative  Negative Final    Buprenorphine, Screen, Urine 03/21/2024 Negative  Negative Final   Lab on 03/13/2024   Component Date Value Ref Range Status    THC, Screen, Urine 03/13/2024 Positive (A)  Negative Final    Phencyclidine (PCP), Urine 03/13/2024 Negative  Negative Final    Cocaine Screen, Urine 03/13/2024 Negative  Negative Final    Methamphetamine, Ur 03/13/2024 Negative  Negative Final    Opiate Screen 03/13/2024 Negative  Negative Final    Amphetamine Screen, Urine 03/13/2024 Negative   Negative Final    Benzodiazepine Screen, Urine 03/13/2024 Negative  Negative Final    Tricyclic Antidepressants Screen 03/13/2024 Negative  Negative Final    Methadone Screen, Urine 03/13/2024 Negative  Negative Final    Barbiturates Screen, Urine 03/13/2024 Negative  Negative Final    Oxycodone Screen, Urine 03/13/2024 Negative  Negative Final    Buprenorphine, Screen, Urine 03/13/2024 Negative  Negative Final       Mental Status Exam  Hygiene:  good  Dress: casual  Attitude: cooperative and agreeable   Motor Activity: appropriate  Eye Contact:  good  Speech: regular rate and rhythm   Mood:  calm and conversant  Affect:  Appropriate  Thought Processes:  Goal directed and Linear  Thought Content:  Normal  Suicidal Thoughts:  denies  Homicidal Thoughts:  denies  Crisis Safety Plan: Safety plan has been discussed.   Hallucinations:  Unknown to clinician.   Reliability: fair  Insight: fair  Judgement: fair  Impulse Control: fair    Recovery/spiritual support group attendance: None     Progress toward goal: Not at goal    Prognosis: Fair with Ongoing Treatment     Self-reported number of days sober:  66 days weed and 127 days for Alcohol    Patient will contact this office, call 911 or present to the nearest emergency room should suicidal or homicidal ideations occur.    Impression/Formulation:    ICD-10-CM ICD-9-CM   1. Alcohol use disorder, severe, dependence  F10.20 303.90       Clinical Maneuvering/Interventions: Therapist utilized a person-centered approach to build rapport with group member. Therapist implemented motivational interviewing techniques to assist client with exploring and resolving ambivalence associated with commitment to change behaviors related to substance use and addiction. Therapist applied cognitive behavioral strategies to facilitate identification of maladaptive patterns of thinking and behavior that contribute to client's risk for continued substance use and relapse. Therapist employed group  interaction activities to build rapport among group members, promote sobriety, and emphasize relapse prevention. Therapist promoted safe nonjudgmental environment by providing group members with unconditional positive regard and encouraging group members to comply with group rules and guidelines. Therapist assisted group member with identifying and implementing healthier coping strategies.      Plan:  Continue [facility] IOP Phase I   Aftercare:  [facility] Phase II  Program Assignments:  Personal recovery plan, relapse prevention plan, attendance of recovery support group meetings, exploration of sponsorship, drug/alcohol screens.     THERAPIST NAME  Jc Lucas MA [facility]  04/04/2024  13:28 EDT

## 2024-04-06 LAB — REF LAB TEST METHOD: NORMAL

## 2024-04-08 ENCOUNTER — OFFICE VISIT (OUTPATIENT)
Dept: PSYCHIATRY | Facility: HOSPITAL | Age: 29
End: 2024-04-08
Payer: COMMERCIAL

## 2024-04-08 DIAGNOSIS — F10.20 ALCOHOL USE DISORDER, SEVERE, DEPENDENCE: Primary | ICD-10-CM

## 2024-04-08 PROCEDURE — H0015 ALCOHOL AND/OR DRUG SERVICES: HCPCS | Performed by: NURSE PRACTITIONER

## 2024-04-08 NOTE — PROGRESS NOTES
CD IOP GROUP     Date: 04/03/2024  Name: Paula Linares    Time In: 19:00   Time Out: 21:00     Number of participants: 16    IOP GROUP NOTE     Data: 3 hour IOP group therapy session (Check-ins, Coping Skills, Relapse Prevention)     Check Ins: Therapist continued facilitation of rapport building strategies between group members. Therapist asked that each patient check in with home life and recovery efforts and identify triggers, cravings, and high risk situations that arise between group sessions. Therapist provided empathy and support during group session.     Session Content/Coping Skills: Juan MAC  , Aime Irizarry [facility] joined the  meeting.  Pharmacy provided education on Narcan at beginning of group.    Check ins completed by group members. Matrix Model RP 27  Serenity prayer and being Grateful materials were reviewed and discussed. Group watched  [email or web address]  on the Serenity Prayer. Clinician discussed with group members identifying what it means to differentiate between what we can control and what we can’t.  Clinician discussed with the group why being grateful is important to recovery.   Group completed an activity of 13 Reasons Why Not. Instead of focusing on negatives that could lead to increased feelings of anxiety and depression, need to train the brain to focus on positives.  Group activity to list 13 things grateful for and what to look forward in the future.      Response:  Patient attended class in person. Patient participated in completion of check in form. Patient on check in form answered no to question of “currently or since your last group meeting, have you had any suicidal thoughts or plan or intent to hurt yourself”, and patient also answered no on check in form to question of “currently or since your last group meeting, have you had any homicidal thoughts or plan or intent to hurt others”.      Personal Assessment 0-10 Scale  (0-none, 10-high)    Anxiety:  0   Depression:  0   Cravings: 0   Motivation: 10  Assessment:     ..  Lab on 04/03/2024   Component Date Value Ref Range Status    THC, Screen, Urine 04/03/2024 Negative  Negative Final    Phencyclidine (PCP), Urine 04/03/2024 Negative  Negative Final    Cocaine Screen, Urine 04/03/2024 Negative  Negative Final    Methamphetamine, Ur 04/03/2024 Negative  Negative Final    Opiate Screen 04/03/2024 Negative  Negative Final    Amphetamine Screen, Urine 04/03/2024 Negative  Negative Final    Benzodiazepine Screen, Urine 04/03/2024 Negative  Negative Final    Tricyclic Antidepressants Screen 04/03/2024 Negative  Negative Final    Methadone Screen, Urine 04/03/2024 Negative  Negative Final    Barbiturates Screen, Urine 04/03/2024 Negative  Negative Final    Oxycodone Screen, Urine 04/03/2024 Negative  Negative Final    Buprenorphine, Screen, Urine 04/03/2024 Negative  Negative Final   Lab on 03/27/2024   Component Date Value Ref Range Status    Reference Lab Report 03/27/2024 See scanned report   Final    THC, Screen, Urine 03/27/2024 Negative  Negative Final    Phencyclidine (PCP), Urine 03/27/2024 Negative  Negative Final    Cocaine Screen, Urine 03/27/2024 Negative  Negative Final    Methamphetamine, Ur 03/27/2024 Negative  Negative Final    Opiate Screen 03/27/2024 Negative  Negative Final    Amphetamine Screen, Urine 03/27/2024 Negative  Negative Final    Benzodiazepine Screen, Urine 03/27/2024 Negative  Negative Final    Tricyclic Antidepressants Screen 03/27/2024 Negative  Negative Final    Methadone Screen, Urine 03/27/2024 Negative  Negative Final    Barbiturates Screen, Urine 03/27/2024 Negative  Negative Final    Oxycodone Screen, Urine 03/27/2024 Negative  Negative Final    Buprenorphine, Screen, Urine 03/27/2024 Negative  Negative Final   Lab on 03/21/2024   Component Date Value Ref Range Status    Reference Lab Report 03/21/2024 See scanned report   Final    THC, Screen, Urine  03/21/2024 Positive (A)  Negative Final    Phencyclidine (PCP), Urine 03/21/2024 Negative  Negative Final    Cocaine Screen, Urine 03/21/2024 Negative  Negative Final    Methamphetamine, Ur 03/21/2024 Negative  Negative Final    Opiate Screen 03/21/2024 Negative  Negative Final    Amphetamine Screen, Urine 03/21/2024 Negative  Negative Final    Benzodiazepine Screen, Urine 03/21/2024 Negative  Negative Final    Tricyclic Antidepressants Screen 03/21/2024 Negative  Negative Final    Methadone Screen, Urine 03/21/2024 Negative  Negative Final    Barbiturates Screen, Urine 03/21/2024 Negative  Negative Final    Oxycodone Screen, Urine 03/21/2024 Negative  Negative Final    Buprenorphine, Screen, Urine 03/21/2024 Negative  Negative Final       Mental Status Exam  Hygiene:  good  Dress: casual  Attitude: cooperative and agreeable   Motor Activity: appropriate  Eye Contact:  good  Speech: regular rate and rhythm   Mood:  calm and conversant  Affect:  Full range and Appropriate  Thought Processes:  Goal directed and Linear  Thought Content:  Normal  Suicidal Thoughts:  denies  Homicidal Thoughts:  denies  Crisis Safety Plan: Safety plan has been discussed.   Hallucinations:  Unknown to clinician.   Reliability: fair  Insight: fair  Judgement: fair  Impulse Control: fair    Recovery/spiritual support group attendance: None     Progress toward goal: Not at goal    Prognosis: Fair with Ongoing Treatment     Self-reported number of days sober:  68 days for weed and 129 days for alcohol     Patient will contact this office, call 911 or present to the nearest emergency room should suicidal or homicidal ideations occur.    Impression/Formulation:    ICD-10-CM ICD-9-CM   1. Alcohol use disorder, severe, dependence  F10.20 303.90       Clinical Maneuvering/Interventions: Therapist utilized a person-centered approach to build rapport with group member. Therapist implemented motivational interviewing techniques to assist client with  exploring and resolving ambivalence associated with commitment to change behaviors related to substance use and addiction. Therapist applied cognitive behavioral strategies to facilitate identification of maladaptive patterns of thinking and behavior that contribute to client's risk for continued substance use and relapse. Therapist employed group interaction activities to build rapport among group members, promote sobriety, and emphasize relapse prevention. Therapist promoted safe nonjudgmental environment by providing group members with unconditional positive regard and encouraging group members to comply with group rules and guidelines. Therapist assisted group member with identifying and implementing healthier coping strategies.      Plan:  Continue [facility] IOP Phase I   Aftercare:  [facility] Phase II  Program Assignments:  Personal recovery plan, relapse prevention plan, attendance of recovery support group meetings, exploration of sponsorship, drug/alcohol screens.     THERAPIST NAME  Jc Lucas MA [facility]  04/08/2024  19:48 EDT

## 2024-04-09 NOTE — PROGRESS NOTES
CD IOP GROUP     Date: 04/04/2024  Name: Paula Linares    Time In: 18:00   Time Out: 20:55     Number of participants: 15    IOP GROUP NOTE     Data: 3 hour IOP group therapy session (Check-ins, Coping Skills, Relapse Prevention)     Check Ins: Therapist continued facilitation of rapport building strategies between group members. Therapist asked that each patient check in with home life and recovery efforts and identify triggers, cravings, and high risk situations that arise between group sessions. Therapist provided empathy and support during group session.     Session Content/Coping Skills: Juan ROMAN , Aime Irizarry [facility], Jazmin MSW Student, and Tracey with [employer]  joined the  meeting. Tracey with [employer]  provided information on services offered and provided group with business card and brochure on [employer] .   Check ins completed by group members. Therapist provided material on Music and Recovery.  Clinician discussed with group members. Clinician discussed with group how music can provide triggers as well as provide benefits while in recovery. Music can reduce stress, improve sleep, Improve mood , provide distraction and Connect with others.  Group completed activity to provide one song  for the group to listen to and provide to the group why this song was chosen.      Response:  Patient attended class in person. Patient participated in completion of check in form. Patient on check in form answered no to question of “currently or since your last group meeting, have you had any suicidal thoughts or plan or intent to hurt yourself”, and patient also answered no on check in form to question of “currently or since your last group meeting, have you had any homicidal thoughts or plan or intent to hurt others”.      Personal Assessment 0-10 Scale (0-none, 10-high)    Anxiety:  0   Depression:  0   Cravings: 0   Motivation: 10  Assessment:     ..  Lab on 04/03/2024    Component Date Value Ref Range Status    THC, Screen, Urine 04/03/2024 Negative  Negative Final    Phencyclidine (PCP), Urine 04/03/2024 Negative  Negative Final    Cocaine Screen, Urine 04/03/2024 Negative  Negative Final    Methamphetamine, Ur 04/03/2024 Negative  Negative Final    Opiate Screen 04/03/2024 Negative  Negative Final    Amphetamine Screen, Urine 04/03/2024 Negative  Negative Final    Benzodiazepine Screen, Urine 04/03/2024 Negative  Negative Final    Tricyclic Antidepressants Screen 04/03/2024 Negative  Negative Final    Methadone Screen, Urine 04/03/2024 Negative  Negative Final    Barbiturates Screen, Urine 04/03/2024 Negative  Negative Final    Oxycodone Screen, Urine 04/03/2024 Negative  Negative Final    Buprenorphine, Screen, Urine 04/03/2024 Negative  Negative Final   Lab on 03/27/2024   Component Date Value Ref Range Status    Reference Lab Report 03/27/2024 See scanned report   Final    THC, Screen, Urine 03/27/2024 Negative  Negative Final    Phencyclidine (PCP), Urine 03/27/2024 Negative  Negative Final    Cocaine Screen, Urine 03/27/2024 Negative  Negative Final    Methamphetamine, Ur 03/27/2024 Negative  Negative Final    Opiate Screen 03/27/2024 Negative  Negative Final    Amphetamine Screen, Urine 03/27/2024 Negative  Negative Final    Benzodiazepine Screen, Urine 03/27/2024 Negative  Negative Final    Tricyclic Antidepressants Screen 03/27/2024 Negative  Negative Final    Methadone Screen, Urine 03/27/2024 Negative  Negative Final    Barbiturates Screen, Urine 03/27/2024 Negative  Negative Final    Oxycodone Screen, Urine 03/27/2024 Negative  Negative Final    Buprenorphine, Screen, Urine 03/27/2024 Negative  Negative Final   Lab on 03/21/2024   Component Date Value Ref Range Status    Reference Lab Report 03/21/2024 See scanned report   Final    THC, Screen, Urine 03/21/2024 Positive (A)  Negative Final    Phencyclidine (PCP), Urine 03/21/2024 Negative  Negative Final    Cocaine  Screen, Urine 03/21/2024 Negative  Negative Final    Methamphetamine, Ur 03/21/2024 Negative  Negative Final    Opiate Screen 03/21/2024 Negative  Negative Final    Amphetamine Screen, Urine 03/21/2024 Negative  Negative Final    Benzodiazepine Screen, Urine 03/21/2024 Negative  Negative Final    Tricyclic Antidepressants Screen 03/21/2024 Negative  Negative Final    Methadone Screen, Urine 03/21/2024 Negative  Negative Final    Barbiturates Screen, Urine 03/21/2024 Negative  Negative Final    Oxycodone Screen, Urine 03/21/2024 Negative  Negative Final    Buprenorphine, Screen, Urine 03/21/2024 Negative  Negative Final       Mental Status Exam  Hygiene:  good  Dress: casual  Attitude: cooperative and agreeable   Motor Activity: appropriate  Eye Contact:  good  Speech: regular rate and rhythm   Mood:  calm and conversant  Affect:  Full range and Appropriate  Thought Processes:  Goal directed and Linear  Thought Content:  Normal  Suicidal Thoughts:  denies  Homicidal Thoughts:  denies  Crisis Safety Plan: Safety plan has been discussed.   Hallucinations:  Unknown to clinician.   Reliability: fair  Insight: fair  Judgement: fair  Impulse Control: fair    Recovery/spiritual support group attendance: None     Progress toward goal: Not at goal    Prognosis: Fair with Ongoing Treatment     Self-reported number of days sober:  Weed 69 Alcohol 130 days    Patient will contact this office, call 911 or present to the nearest emergency room should suicidal or homicidal ideations occur.    Impression/Formulation:    ICD-10-CM ICD-9-CM   1. Alcohol use disorder, severe, dependence  F10.20 303.90       Clinical Maneuvering/Interventions: Therapist utilized a person-centered approach to build rapport with group member. Therapist implemented motivational interviewing techniques to assist client with exploring and resolving ambivalence associated with commitment to change behaviors related to substance use and addiction. Therapist  applied cognitive behavioral strategies to facilitate identification of maladaptive patterns of thinking and behavior that contribute to client's risk for continued substance use and relapse. Therapist employed group interaction activities to build rapport among group members, promote sobriety, and emphasize relapse prevention. Therapist promoted safe nonjudgmental environment by providing group members with unconditional positive regard and encouraging group members to comply with group rules and guidelines. Therapist assisted group member with identifying and implementing healthier coping strategies.      Plan:  Continue [facility] IOP Phase I   Aftercare:  [facility] Phase II  Program Assignments:  Personal recovery plan, relapse prevention plan, attendance of recovery support group meetings, exploration of sponsorship, drug/alcohol screens.     THERAPIST NAME  Jc Lucas MA [facility]  04/08/2024  22:26 EDT

## 2024-04-10 ENCOUNTER — OFFICE VISIT (OUTPATIENT)
Dept: PSYCHIATRY | Facility: HOSPITAL | Age: 29
End: 2024-04-10
Payer: COMMERCIAL

## 2024-04-10 ENCOUNTER — LAB (OUTPATIENT)
Dept: LAB | Facility: HOSPITAL | Age: 29
End: 2024-04-10
Payer: COMMERCIAL

## 2024-04-10 DIAGNOSIS — F10.20 ALCOHOL USE DISORDER, SEVERE, DEPENDENCE: Primary | ICD-10-CM

## 2024-04-10 DIAGNOSIS — F10.20 ALCOHOL USE DISORDER, SEVERE, DEPENDENCE: ICD-10-CM

## 2024-04-10 PROCEDURE — 80306 DRUG TEST PRSMV INSTRMNT: CPT

## 2024-04-10 PROCEDURE — H0015 ALCOHOL AND/OR DRUG SERVICES: HCPCS | Performed by: NURSE PRACTITIONER

## 2024-04-10 NOTE — PROGRESS NOTES
CD IOP GROUP     Date: 04/08/2024  Name: Paula Linares    Time In: 1802   Time Out: Approximately 2040     Number of participants: 14    IOP GROUP NOTE     Data: 3 hour IOP group therapy session (Check-ins, Coping Skills, Relapse Prevention)     Check Ins: Therapist continued facilitation of rapport building strategies between group members. Therapist asked that each patient check in with home life and recovery efforts and identify triggers, cravings, and high risk situations that arise between group sessions. Therapist provided empathy and support during group session.     Session Content/Coping Skills: [facility] Liaison Juan attended for first part of session. Jazmin, [employer] [facility] [employer] student, attended for session. Check ins completed by group members. Individual Treatment Plans were reviewed with group members. Group members signed treatment plans. Living in Balance- Session 12- Relapse Prevention Basics reviewed and discussed. Group members completed a relapse prevention plan. Group members shared their relapse prevention plans with the group.     Response: Patient attended class in person. Patient participated in completion of check in form. Patient on check in form answered no to question of “currently or since your last group meeting, have you had any suicidal thoughts or plan or intent to hurt yourself”, and patient also answered no on check in form to question of “currently or since your last group meeting, have you had any homicidal thoughts or plan or intent to hurt others”.     Personal Assessment 0-10 Scale (0-none, 10-high)    Anxiety:  0   Depression:  0   Cravings: 0     Assessment:     ..  Lab on 04/03/2024   Component Date Value Ref Range Status    THC, Screen, Urine 04/03/2024 Negative  Negative Final    Phencyclidine (PCP), Urine 04/03/2024 Negative  Negative Final    Cocaine Screen, Urine 04/03/2024 Negative  Negative Final    Methamphetamine, Ur 04/03/2024  Negative  Negative Final    Opiate Screen 04/03/2024 Negative  Negative Final    Amphetamine Screen, Urine 04/03/2024 Negative  Negative Final    Benzodiazepine Screen, Urine 04/03/2024 Negative  Negative Final    Tricyclic Antidepressants Screen 04/03/2024 Negative  Negative Final    Methadone Screen, Urine 04/03/2024 Negative  Negative Final    Barbiturates Screen, Urine 04/03/2024 Negative  Negative Final    Oxycodone Screen, Urine 04/03/2024 Negative  Negative Final    Buprenorphine, Screen, Urine 04/03/2024 Negative  Negative Final   Lab on 03/27/2024   Component Date Value Ref Range Status    Reference Lab Report 03/27/2024 See scanned report   Final    THC, Screen, Urine 03/27/2024 Negative  Negative Final    Phencyclidine (PCP), Urine 03/27/2024 Negative  Negative Final    Cocaine Screen, Urine 03/27/2024 Negative  Negative Final    Methamphetamine, Ur 03/27/2024 Negative  Negative Final    Opiate Screen 03/27/2024 Negative  Negative Final    Amphetamine Screen, Urine 03/27/2024 Negative  Negative Final    Benzodiazepine Screen, Urine 03/27/2024 Negative  Negative Final    Tricyclic Antidepressants Screen 03/27/2024 Negative  Negative Final    Methadone Screen, Urine 03/27/2024 Negative  Negative Final    Barbiturates Screen, Urine 03/27/2024 Negative  Negative Final    Oxycodone Screen, Urine 03/27/2024 Negative  Negative Final    Buprenorphine, Screen, Urine 03/27/2024 Negative  Negative Final   Lab on 03/21/2024   Component Date Value Ref Range Status    Reference Lab Report 03/21/2024 See scanned report   Final    THC, Screen, Urine 03/21/2024 Positive (A)  Negative Final    Phencyclidine (PCP), Urine 03/21/2024 Negative  Negative Final    Cocaine Screen, Urine 03/21/2024 Negative  Negative Final    Methamphetamine, Ur 03/21/2024 Negative  Negative Final    Opiate Screen 03/21/2024 Negative  Negative Final    Amphetamine Screen, Urine 03/21/2024 Negative  Negative Final    Benzodiazepine Screen, Urine  03/21/2024 Negative  Negative Final    Tricyclic Antidepressants Screen 03/21/2024 Negative  Negative Final    Methadone Screen, Urine 03/21/2024 Negative  Negative Final    Barbiturates Screen, Urine 03/21/2024 Negative  Negative Final    Oxycodone Screen, Urine 03/21/2024 Negative  Negative Final    Buprenorphine, Screen, Urine 03/21/2024 Negative  Negative Final       Mental Status Exam  Hygiene:  good  Dress: casual  Attitude: cooperative and agreeable   Motor Activity: appropriate  Eye Contact:  good  Speech: regular rate and rhythm   Mood:  calm and cooperative  Affect:  Appropriate  Thought Processes:  Linear  Thought Content:  Normal  Suicidal Thoughts:  denies  Homicidal Thoughts:  denies  Crisis Safety Plan: Safety plan has been discussed.   Hallucinations:  Unknown to clinician.   Reliability: fair  Insight: fair  Judgement: fair  Impulse Control: fair    Recovery/spiritual support group attendance: No.     Progress toward goal: Not at goal    Prognosis: Fair with Ongoing Treatment     Self-reported number of days sober: Patient on check in form reported Weed 73 days/ Alc 134 days.     Patient will contact this office, call 911 or present to the nearest emergency room should suicidal or homicidal ideations occur.    Impression/Formulation:    ICD-10-CM ICD-9-CM   1. Alcohol use disorder, severe, dependence  F10.20 303.90       Clinical Maneuvering/Interventions: Therapist utilized a person-centered approach to build rapport with group member. Therapist implemented motivational interviewing techniques to assist client with exploring and resolving ambivalence associated with commitment to change behaviors related to substance use and addiction. Therapist applied cognitive behavioral strategies to facilitate identification of maladaptive patterns of thinking and behavior that contribute to client's risk for continued substance use and relapse. Therapist employed group interaction activities to build rapport  among group members, promote sobriety, and emphasize relapse prevention. Therapist promoted safe nonjudgmental environment by providing group members with unconditional positive regard and encouraging group members to comply with group rules and guidelines. Therapist assisted group member with identifying and implementing healthier coping strategies.      Plan:  Continue [facility] IOP Phase I   Aftercare:  [facility] Phase II  Program Assignments:  Personal recovery plan, relapse prevention plan, attendance of recovery support group meetings, exploration of sponsorship, drug/alcohol screens.     Stan Powell LCSW  4/10/2024  10:15 EDT

## 2024-04-11 ENCOUNTER — OFFICE VISIT (OUTPATIENT)
Dept: PSYCHIATRY | Facility: HOSPITAL | Age: 29
End: 2024-04-11
Payer: COMMERCIAL

## 2024-04-11 DIAGNOSIS — F10.20 ALCOHOL USE DISORDER, SEVERE, DEPENDENCE: Primary | ICD-10-CM

## 2024-04-11 LAB — REF LAB TEST METHOD: NORMAL

## 2024-04-11 PROCEDURE — H0015 ALCOHOL AND/OR DRUG SERVICES: HCPCS | Performed by: NURSE PRACTITIONER

## 2024-04-11 NOTE — PROGRESS NOTES
CD IOP GROUP     Date: 04/10/2024  Name: Paula Linares    Time In: 1800   Time Out: Approximately 2040     Number of participants: 11    IOP GROUP NOTE     Data: 3 hour IOP group therapy session (Check-ins, Coping Skills, Relapse Prevention)     Check Ins: Therapist continued facilitation of rapport building strategies between group members. Therapist asked that each patient check in with home life and recovery efforts and identify triggers, cravings, and high risk situations that arise between group sessions. Therapist provided empathy and support during group session.     Session Content/Coping Skills:  Kaia, attended first part of session. Clinician introduced discussion of addiction and loss. Check ins completed by group members. Living in Balance, session 30, addiction and loss part 1 reviewed and discussed. Group members were asked to list three things that they are grateful for.     Response: Patient attended class in person. Patient participated in completion of check in form. Patient on check in form answered no to question of “currently or since your last group meeting, have you had any suicidal thoughts or plan or intent to hurt yourself”, and patient also answered no on check in form to question of “currently or since your last group meeting, have you had any homicidal thoughts or plan or intent to hurt others”.     Personal Assessment 0-10 Scale (0-none, 10-high)    Anxiety:  2   Depression:  0   Cravings: 0     Assessment:     ..  Lab on 04/10/2024   Component Date Value Ref Range Status    THC, Screen, Urine 04/10/2024 Negative  Negative Final    Phencyclidine (PCP), Urine 04/10/2024 Negative  Negative Final    Cocaine Screen, Urine 04/10/2024 Negative  Negative Final    Methamphetamine, Ur 04/10/2024 Negative  Negative Final    Opiate Screen 04/10/2024 Negative  Negative Final    Amphetamine Screen, Urine 04/10/2024 Negative  Negative Final    Benzodiazepine Screen,  Urine 04/10/2024 Negative  Negative Final    Tricyclic Antidepressants Screen 04/10/2024 Negative  Negative Final    Methadone Screen, Urine 04/10/2024 Negative  Negative Final    Barbiturates Screen, Urine 04/10/2024 Negative  Negative Final    Oxycodone Screen, Urine 04/10/2024 Negative  Negative Final    Buprenorphine, Screen, Urine 04/10/2024 Negative  Negative Final    Reference Lab Report 04/10/2024 See scanned report   Final   Lab on 04/03/2024   Component Date Value Ref Range Status    THC, Screen, Urine 04/03/2024 Negative  Negative Final    Phencyclidine (PCP), Urine 04/03/2024 Negative  Negative Final    Cocaine Screen, Urine 04/03/2024 Negative  Negative Final    Methamphetamine, Ur 04/03/2024 Negative  Negative Final    Opiate Screen 04/03/2024 Negative  Negative Final    Amphetamine Screen, Urine 04/03/2024 Negative  Negative Final    Benzodiazepine Screen, Urine 04/03/2024 Negative  Negative Final    Tricyclic Antidepressants Screen 04/03/2024 Negative  Negative Final    Methadone Screen, Urine 04/03/2024 Negative  Negative Final    Barbiturates Screen, Urine 04/03/2024 Negative  Negative Final    Oxycodone Screen, Urine 04/03/2024 Negative  Negative Final    Buprenorphine, Screen, Urine 04/03/2024 Negative  Negative Final    Reference Lab Report 04/03/2024 See scanned report   Final   Lab on 03/27/2024   Component Date Value Ref Range Status    Reference Lab Report 03/27/2024 See scanned report   Final    THC, Screen, Urine 03/27/2024 Negative  Negative Final    Phencyclidine (PCP), Urine 03/27/2024 Negative  Negative Final    Cocaine Screen, Urine 03/27/2024 Negative  Negative Final    Methamphetamine, Ur 03/27/2024 Negative  Negative Final    Opiate Screen 03/27/2024 Negative  Negative Final    Amphetamine Screen, Urine 03/27/2024 Negative  Negative Final    Benzodiazepine Screen, Urine 03/27/2024 Negative  Negative Final    Tricyclic Antidepressants Screen 03/27/2024 Negative  Negative Final     Methadone Screen, Urine 03/27/2024 Negative  Negative Final    Barbiturates Screen, Urine 03/27/2024 Negative  Negative Final    Oxycodone Screen, Urine 03/27/2024 Negative  Negative Final    Buprenorphine, Screen, Urine 03/27/2024 Negative  Negative Final   Lab on 03/21/2024   Component Date Value Ref Range Status    Reference Lab Report 03/21/2024 See scanned report   Final    THC, Screen, Urine 03/21/2024 Positive (A)  Negative Final    Phencyclidine (PCP), Urine 03/21/2024 Negative  Negative Final    Cocaine Screen, Urine 03/21/2024 Negative  Negative Final    Methamphetamine, Ur 03/21/2024 Negative  Negative Final    Opiate Screen 03/21/2024 Negative  Negative Final    Amphetamine Screen, Urine 03/21/2024 Negative  Negative Final    Benzodiazepine Screen, Urine 03/21/2024 Negative  Negative Final    Tricyclic Antidepressants Screen 03/21/2024 Negative  Negative Final    Methadone Screen, Urine 03/21/2024 Negative  Negative Final    Barbiturates Screen, Urine 03/21/2024 Negative  Negative Final    Oxycodone Screen, Urine 03/21/2024 Negative  Negative Final    Buprenorphine, Screen, Urine 03/21/2024 Negative  Negative Final       Mental Status Exam  Hygiene:  good  Dress: casual  Attitude: cooperative and agreeable   Motor Activity: appropriate  Eye Contact:  good  Speech: regular rate and rhythm   Mood:  calm and cooperative  Affect:  Appropriate  Thought Processes:  Linear  Thought Content:  Normal  Suicidal Thoughts:  denies  Homicidal Thoughts:  denies  Crisis Safety Plan: Safety plan has been discussed.   Hallucinations:  Unknown to clinician.   Reliability: fair  Insight: fair  Judgement: fair  Impulse Control: fair    Recovery/spiritual support group attendance: No     Progress toward goal: Not at goal    Prognosis: Fair with Ongoing Treatment     Self-reported number of days sober:  Patient on check in form reported Weed 75 days/Alc 136 days.    Patient will contact this office, call 911 or present to the  nearest emergency room should suicidal or homicidal ideations occur.    Impression/Formulation:    ICD-10-CM ICD-9-CM   1. Alcohol use disorder, severe, dependence  F10.20 303.90       Clinical Maneuvering/Interventions: Therapist utilized a person-centered approach to build rapport with group member. Therapist implemented motivational interviewing techniques to assist client with exploring and resolving ambivalence associated with commitment to change behaviors related to substance use and addiction. Therapist applied cognitive behavioral strategies to facilitate identification of maladaptive patterns of thinking and behavior that contribute to client's risk for continued substance use and relapse. Therapist employed group interaction activities to build rapport among group members, promote sobriety, and emphasize relapse prevention. Therapist promoted safe nonjudgmental environment by providing group members with unconditional positive regard and encouraging group members to comply with group rules and guidelines. Therapist assisted group member with identifying and implementing healthier coping strategies.      Plan:  Continue [facility] IOP Phase I   Aftercare:  [facility] Phase II  Program Assignments:  Personal recovery plan, relapse prevention plan, attendance of recovery support group meetings, exploration of sponsorship, drug/alcohol screens.     Stan Powell LCSW  4/17/2024  08:19 EDT

## 2024-04-15 ENCOUNTER — OFFICE VISIT (OUTPATIENT)
Dept: PSYCHIATRY | Facility: HOSPITAL | Age: 29
End: 2024-04-15
Payer: COMMERCIAL

## 2024-04-15 DIAGNOSIS — F10.20 ALCOHOL USE DISORDER, SEVERE, DEPENDENCE: Primary | ICD-10-CM

## 2024-04-15 PROCEDURE — H0015 ALCOHOL AND/OR DRUG SERVICES: HCPCS | Performed by: SOCIAL WORKER

## 2024-04-16 LAB — REF LAB TEST METHOD: NORMAL

## 2024-04-17 ENCOUNTER — OFFICE VISIT (OUTPATIENT)
Dept: PSYCHIATRY | Facility: HOSPITAL | Age: 29
End: 2024-04-17
Payer: COMMERCIAL

## 2024-04-17 DIAGNOSIS — F10.20 ALCOHOL USE DISORDER, SEVERE, DEPENDENCE: Primary | ICD-10-CM

## 2024-04-17 PROCEDURE — H0015 ALCOHOL AND/OR DRUG SERVICES: HCPCS | Performed by: NURSE PRACTITIONER

## 2024-04-18 ENCOUNTER — LAB (OUTPATIENT)
Dept: LAB | Facility: HOSPITAL | Age: 29
End: 2024-04-18
Payer: COMMERCIAL

## 2024-04-18 ENCOUNTER — OFFICE VISIT (OUTPATIENT)
Dept: PSYCHIATRY | Facility: HOSPITAL | Age: 29
End: 2024-04-18
Payer: COMMERCIAL

## 2024-04-18 DIAGNOSIS — F10.20 ALCOHOL USE DISORDER, SEVERE, DEPENDENCE: ICD-10-CM

## 2024-04-18 DIAGNOSIS — F10.20 ALCOHOL USE DISORDER, SEVERE, DEPENDENCE: Primary | ICD-10-CM

## 2024-04-18 PROCEDURE — H0015 ALCOHOL AND/OR DRUG SERVICES: HCPCS | Performed by: NURSE PRACTITIONER

## 2024-04-18 PROCEDURE — 80306 DRUG TEST PRSMV INSTRMNT: CPT

## 2024-04-22 ENCOUNTER — OFFICE VISIT (OUTPATIENT)
Dept: PSYCHIATRY | Facility: HOSPITAL | Age: 29
End: 2024-04-22
Payer: COMMERCIAL

## 2024-04-22 DIAGNOSIS — F10.20 ALCOHOL USE DISORDER, SEVERE, DEPENDENCE: Primary | ICD-10-CM

## 2024-04-22 PROCEDURE — H0015 ALCOHOL AND/OR DRUG SERVICES: HCPCS | Performed by: NURSE PRACTITIONER

## 2024-04-23 NOTE — PROGRESS NOTES
CD IOP GROUP     Date: 04/15/2024  Name: Paula Linares    Time In: 1800   Time Out: Approximately 2036     Number of participants: 13    IOP GROUP NOTE     Data: 3 hour IOP group therapy session (Check-ins, Coping Skills, Relapse Prevention)     Check Ins: Therapist continued facilitation of rapport building strategies between group members. Therapist asked that each patient check in with home life and recovery efforts and identify triggers, cravings, and high risk situations that arise between group sessions. Therapist provided empathy and support during group session.     Session Content/Coping Skills: [employer] student, attended for session.  Kaia and [facility] Liaison Juan also attended first part of session. Check ins completed by group members. 2 truths and a lie icebreaker activity completed by group members. Value card sort activity completed by group members. Clinician facilitated discussion of values, how addiction impacted values, and some of the benefits of understanding values in recovery.     Response: Patient attended class in person. Patient participated in completion of check in form. Patient on check in form answered no to question of “currently or since your last group meeting, have you had any suicidal thoughts or plan or intent to hurt yourself”, and patient also answered no on check in form to question of “currently or since your last group meeting, have you had any homicidal thoughts or plan or intent to hurt others”.     Personal Assessment 0-10 Scale (0-none, 10-high)    Anxiety:  0   Depression:  0   Cravings: 0     Assessment:     ..  Lab on 04/10/2024   Component Date Value Ref Range Status    THC, Screen, Urine 04/10/2024 Negative  Negative Final    Phencyclidine (PCP), Urine 04/10/2024 Negative  Negative Final    Cocaine Screen, Urine 04/10/2024 Negative  Negative Final    Methamphetamine, Ur 04/10/2024 Negative  Negative  Final    Opiate Screen 04/10/2024 Negative  Negative Final    Amphetamine Screen, Urine 04/10/2024 Negative  Negative Final    Benzodiazepine Screen, Urine 04/10/2024 Negative  Negative Final    Tricyclic Antidepressants Screen 04/10/2024 Negative  Negative Final    Methadone Screen, Urine 04/10/2024 Negative  Negative Final    Barbiturates Screen, Urine 04/10/2024 Negative  Negative Final    Oxycodone Screen, Urine 04/10/2024 Negative  Negative Final    Buprenorphine, Screen, Urine 04/10/2024 Negative  Negative Final    Reference Lab Report 04/10/2024 See scanned report   Final   Lab on 04/03/2024   Component Date Value Ref Range Status    THC, Screen, Urine 04/03/2024 Negative  Negative Final    Phencyclidine (PCP), Urine 04/03/2024 Negative  Negative Final    Cocaine Screen, Urine 04/03/2024 Negative  Negative Final    Methamphetamine, Ur 04/03/2024 Negative  Negative Final    Opiate Screen 04/03/2024 Negative  Negative Final    Amphetamine Screen, Urine 04/03/2024 Negative  Negative Final    Benzodiazepine Screen, Urine 04/03/2024 Negative  Negative Final    Tricyclic Antidepressants Screen 04/03/2024 Negative  Negative Final    Methadone Screen, Urine 04/03/2024 Negative  Negative Final    Barbiturates Screen, Urine 04/03/2024 Negative  Negative Final    Oxycodone Screen, Urine 04/03/2024 Negative  Negative Final    Buprenorphine, Screen, Urine 04/03/2024 Negative  Negative Final    Reference Lab Report 04/03/2024 See scanned report   Final   Lab on 03/27/2024   Component Date Value Ref Range Status    Reference Lab Report 03/27/2024 See scanned report   Final    THC, Screen, Urine 03/27/2024 Negative  Negative Final    Phencyclidine (PCP), Urine 03/27/2024 Negative  Negative Final    Cocaine Screen, Urine 03/27/2024 Negative  Negative Final    Methamphetamine, Ur 03/27/2024 Negative  Negative Final    Opiate Screen 03/27/2024 Negative  Negative Final    Amphetamine Screen, Urine 03/27/2024 Negative  Negative  Final    Benzodiazepine Screen, Urine 03/27/2024 Negative  Negative Final    Tricyclic Antidepressants Screen 03/27/2024 Negative  Negative Final    Methadone Screen, Urine 03/27/2024 Negative  Negative Final    Barbiturates Screen, Urine 03/27/2024 Negative  Negative Final    Oxycodone Screen, Urine 03/27/2024 Negative  Negative Final    Buprenorphine, Screen, Urine 03/27/2024 Negative  Negative Final       Mental Status Exam  Hygiene:  good  Dress: casual  Attitude: cooperative and agreeable   Motor Activity: appropriate  Eye Contact:  good  Speech: regular rate and rhythm   Mood:  calm and cooperative  Affect:  Appropriate  Thought Processes:  Linear  Thought Content:  Normal  Suicidal Thoughts:  denies  Homicidal Thoughts:  denies  Crisis Safety Plan: Safety plan has been discussed.   Hallucinations:  Unknown to clinician.   Reliability: fair  Insight: fair  Judgement: fair  Impulse Control: fair    Recovery/spiritual support group attendance: No.      Progress toward goal: Not at goal    Prognosis: Fair with Ongoing Treatment     Self-reported number of days sober: Patient on check in form reported weed 80 days/Alc 141 days.     Patient will contact this office, call 911 or present to the nearest emergency room should suicidal or homicidal ideations occur.    Impression/Formulation:    ICD-10-CM ICD-9-CM   1. Alcohol use disorder, severe, dependence  F10.20 303.90       Clinical Maneuvering/Interventions: Therapist utilized a person-centered approach to build rapport with group member. Therapist implemented motivational interviewing techniques to assist client with exploring and resolving ambivalence associated with commitment to change behaviors related to substance use and addiction. Therapist applied cognitive behavioral strategies to facilitate identification of maladaptive patterns of thinking and behavior that contribute to client's risk for continued substance use and relapse. Therapist employed group  interaction activities to build rapport among group members, promote sobriety, and emphasize relapse prevention. Therapist promoted safe nonjudgmental environment by providing group members with unconditional positive regard and encouraging group members to comply with group rules and guidelines. Therapist assisted group member with identifying and implementing healthier coping strategies.      Plan:  Continue [facility] IOP Phase I   Aftercare:  [facility] Phase II  Program Assignments:  Personal recovery plan, relapse prevention plan, attendance of recovery support group meetings, exploration of sponsorship, drug/alcohol screens.     Stan Powell LCSW  4/23/2024  18:49 EDT

## 2024-04-23 NOTE — PROGRESS NOTES
CD IOP GROUP     Date: 04/11/2024  Name: Paula Linares    Time In: 1800   Time Out: Approximately 2040     Number of participants: 14    IOP GROUP NOTE     Data: 3 hour IOP group therapy session (Check-ins, Coping Skills, Relapse Prevention)     Check Ins: Therapist continued facilitation of rapport building strategies between group members. Therapist asked that each patient check in with home life and recovery efforts and identify triggers, cravings, and high risk situations that arise between group sessions. Therapist provided empathy and support during group session.     Session Content/Coping Skills: BARRY Salomon, attended for first part of session and answered group questions regarding phase 2 and 3 of CD-IOP program. Check ins completed by group members. Clinician processed stressors presented by group members. Symptoms of PAWS reviewed via share screen and PAWS symptoms discussed as a group (retrieved from [email or web address] Living in [facility]- Addiction and Loss finished.     Response: Patient attended class in person. Patient participated in completion of check in form. Patient on check in form answered no to question of “currently or since your last group meeting, have you had any suicidal thoughts or plan or intent to hurt yourself”, and patient also answered no on check in form to question of “currently or since your last group meeting, have you had any homicidal thoughts or plan or intent to hurt others”. Patient denied suicidal or homicidal thoughts.     Personal Assessment 0-10 Scale (0-none, 10-high)    Anxiety:  2   Depression:  1   Cravings: 2     Assessment:     ..  Lab on 04/10/2024   Component Date Value Ref Range Status    THC, Screen, Urine 04/10/2024 Negative  Negative Final    Phencyclidine (PCP), Urine 04/10/2024 Negative  Negative Final    Cocaine Screen, Urine 04/10/2024 Negative  Negative Final    Methamphetamine, Ur 04/10/2024 Negative  Negative Final    Opiate Screen  04/10/2024 Negative  Negative Final    Amphetamine Screen, Urine 04/10/2024 Negative  Negative Final    Benzodiazepine Screen, Urine 04/10/2024 Negative  Negative Final    Tricyclic Antidepressants Screen 04/10/2024 Negative  Negative Final    Methadone Screen, Urine 04/10/2024 Negative  Negative Final    Barbiturates Screen, Urine 04/10/2024 Negative  Negative Final    Oxycodone Screen, Urine 04/10/2024 Negative  Negative Final    Buprenorphine, Screen, Urine 04/10/2024 Negative  Negative Final    Reference Lab Report 04/10/2024 See scanned report   Final   Lab on 04/03/2024   Component Date Value Ref Range Status    THC, Screen, Urine 04/03/2024 Negative  Negative Final    Phencyclidine (PCP), Urine 04/03/2024 Negative  Negative Final    Cocaine Screen, Urine 04/03/2024 Negative  Negative Final    Methamphetamine, Ur 04/03/2024 Negative  Negative Final    Opiate Screen 04/03/2024 Negative  Negative Final    Amphetamine Screen, Urine 04/03/2024 Negative  Negative Final    Benzodiazepine Screen, Urine 04/03/2024 Negative  Negative Final    Tricyclic Antidepressants Screen 04/03/2024 Negative  Negative Final    Methadone Screen, Urine 04/03/2024 Negative  Negative Final    Barbiturates Screen, Urine 04/03/2024 Negative  Negative Final    Oxycodone Screen, Urine 04/03/2024 Negative  Negative Final    Buprenorphine, Screen, Urine 04/03/2024 Negative  Negative Final    Reference Lab Report 04/03/2024 See scanned report   Final   Lab on 03/27/2024   Component Date Value Ref Range Status    Reference Lab Report 03/27/2024 See scanned report   Final    THC, Screen, Urine 03/27/2024 Negative  Negative Final    Phencyclidine (PCP), Urine 03/27/2024 Negative  Negative Final    Cocaine Screen, Urine 03/27/2024 Negative  Negative Final    Methamphetamine, Ur 03/27/2024 Negative  Negative Final    Opiate Screen 03/27/2024 Negative  Negative Final    Amphetamine Screen, Urine 03/27/2024 Negative  Negative Final    Benzodiazepine  Screen, Urine 03/27/2024 Negative  Negative Final    Tricyclic Antidepressants Screen 03/27/2024 Negative  Negative Final    Methadone Screen, Urine 03/27/2024 Negative  Negative Final    Barbiturates Screen, Urine 03/27/2024 Negative  Negative Final    Oxycodone Screen, Urine 03/27/2024 Negative  Negative Final    Buprenorphine, Screen, Urine 03/27/2024 Negative  Negative Final       Mental Status Exam  Hygiene:  good  Dress: casual  Attitude: cooperative and agreeable   Motor Activity: appropriate  Eye Contact:  good  Speech: regular rate and rhythm   Mood:  calm and cooperative  Affect:  Appropriate  Thought Processes:  Linear  Thought Content:  Normal  Suicidal Thoughts:  denies  Homicidal Thoughts:  denies  Crisis Safety Plan: Safety plan has been discussed.   Hallucinations:  Unknown to clinician.   Reliability: fair  Insight: fair  Judgement: fair  Impulse Control: fair    Recovery/spiritual support group attendance: No.      Progress toward goal: Not at goal    Prognosis: Fair with Ongoing Treatment     Self-reported number of days sober: Patient on check in form reported weed 76 days/Alc 137 days.     Patient will contact this office, call 911 or present to the nearest emergency room should suicidal or homicidal ideations occur.    Impression/Formulation:    ICD-10-CM ICD-9-CM   1. Alcohol use disorder, severe, dependence  F10.20 303.90       Clinical Maneuvering/Interventions: Therapist utilized a person-centered approach to build rapport with group member. Therapist implemented motivational interviewing techniques to assist client with exploring and resolving ambivalence associated with commitment to change behaviors related to substance use and addiction. Therapist applied cognitive behavioral strategies to facilitate identification of maladaptive patterns of thinking and behavior that contribute to client's risk for continued substance use and relapse. Therapist employed group interaction activities to  build rapport among group members, promote sobriety, and emphasize relapse prevention. Therapist promoted safe nonjudgmental environment by providing group members with unconditional positive regard and encouraging group members to comply with group rules and guidelines. Therapist assisted group member with identifying and implementing healthier coping strategies.      Plan:  Continue [facility] IOP Phase I   Aftercare:  [facility] Phase II  Program Assignments:  Personal recovery plan, relapse prevention plan, attendance of recovery support group meetings, exploration of sponsorship, drug/alcohol screens.     Stan Powell LCSW  4/23/2024  13:56 EDT

## 2024-04-24 LAB — REF LAB TEST METHOD: NORMAL

## 2024-04-25 ENCOUNTER — OFFICE VISIT (OUTPATIENT)
Dept: PSYCHIATRY | Facility: CLINIC | Age: 29
End: 2024-04-25
Payer: COMMERCIAL

## 2024-04-25 ENCOUNTER — DOCUMENTATION (OUTPATIENT)
Dept: PSYCHIATRY | Facility: HOSPITAL | Age: 29
End: 2024-04-25
Payer: COMMERCIAL

## 2024-04-25 ENCOUNTER — LAB (OUTPATIENT)
Dept: LAB | Facility: HOSPITAL | Age: 29
End: 2024-04-25
Payer: COMMERCIAL

## 2024-04-25 DIAGNOSIS — F10.20 ALCOHOL USE DISORDER, SEVERE, DEPENDENCE: Primary | ICD-10-CM

## 2024-04-25 DIAGNOSIS — F41.1 GAD (GENERALIZED ANXIETY DISORDER): ICD-10-CM

## 2024-04-25 DIAGNOSIS — F10.20 ALCOHOL USE DISORDER, SEVERE, DEPENDENCE: ICD-10-CM

## 2024-04-25 DIAGNOSIS — F12.90 CANNABIS USE WITHOUT COMPLICATION: ICD-10-CM

## 2024-04-25 PROCEDURE — 80306 DRUG TEST PRSMV INSTRMNT: CPT

## 2024-04-25 PROCEDURE — 1160F RVW MEDS BY RX/DR IN RCRD: CPT | Performed by: COUNSELOR

## 2024-04-25 PROCEDURE — 1159F MED LIST DOCD IN RCRD: CPT | Performed by: COUNSELOR

## 2024-04-25 PROCEDURE — 90853 GROUP PSYCHOTHERAPY: CPT | Performed by: COUNSELOR

## 2024-04-25 RX ORDER — FLUOXETINE 10 MG/1
10 CAPSULE ORAL DAILY
Qty: 30 CAPSULE | Refills: 1 | OUTPATIENT
Start: 2024-04-25

## 2024-04-25 NOTE — PROGRESS NOTES
"[facility] PHASE II / Phase III GROUP NOTE    Name: Paula Linares  Date: April 25, 2024    Time in:?3:30   Time out:?4:30   Participants: 5     Data: 1?hour group therapy session (Check ins, noticing neuroception, and coping skills)      Clinical Maneuvering/Interventions:?Therapist utilized a person-centered, Motivation interviewing, and CBT approaches to build rapport, exploring and resolving ambivalence associated with commitment to change behaviors related to substance use with and addiction, group member.?Therapist implemented motivational interviewing techniques to assist client with, facilitate identification of maladaptive patterns of thinking and behavior that contribute to client's risk for continued substance use and relapse.?Therapist employed group interaction activities to build rapport among group members, promote sobriety, and emphasize relapse prevention.?Therapist promoted a safe nonjudgmental environment by providing group members with unconditional positive regard and encouraging group members to comply with group rules and guidelines. Therapist assisted group member with identifying and implementing healthier coping strategies.      Response:?Patient attended class in person. Patient participated in completion of check in form. Patient on check in form answered no to question \"currently or since last group meeting,?have you had any suicidal thoughts or plan or intent to hurt yourself”, and patient also answered no to question of \"currently or since your last group meeting, have you had any homicidal thoughts or plan or intent to hurt others\". Patient identified a neuroception that she has struggled with what coping skills she is using to assist her with making healthy changes and regulating her mood in sobriety.    On a 1:10 Scale (0-none, 10-high):    Anxiety: 1  Depression: 0  Cravings: 0    Assessment     Diagnoses and all orders for this visit:    1. Alcohol use disorder, severe, " dependence (Primary)    2. Cannabis use without complication             Progress toward goal: At goal    Functional Status: Moderate impairment     Prognosis: Good with Ongoing Treatment     Mental Status Exam:   Hygiene:   good  Cooperation:  Cooperative  Eye Contact:  Good  Psychomotor Behavior:  Appropriate  Affect:  Appropriate  Mood: normal  Speech:  Normal  Thought Process:  Linear  Thought Content:  Mood congruent  Suicidal:  None  Homicidal:  None  Hallucinations:  None  Delusion:  None  Memory:  Intact  Orientation:  Person, Place, Time, and Situation  Reliability:  fair  Insight:  Fair  Judgement:  Fair  Impulse Control:  Fair  Physical/Medical Issues:  No      Assessment:  Engaged in activity/Process and self-disclosed: Yes  Affect:Appropriate   Applies topic to self: Yes  Able to give and receive feedback: Yes    Urinalysis: Negative  on prelim result dated 04/25/2024  Labs:   Last Urine Toxicity  More data exists         Latest Ref Rng & Units 4/25/2024 4/18/2024   LAST URINE TOXICITY RESULTS   Amphetamine, Urine Qual Negative Negative  Negative    Barbiturates Screen, Urine Negative Negative  Negative    Benzodiazepine Screen, Urine Negative Negative  Negative    Buprenorphine, Screen, Urine Negative Negative  Negative    Cocaine Screen, Urine Negative Negative  Negative    Methadone Screen , Urine Negative Negative  Negative    Methamphetamine, Ur Negative Negative  Negative         Self-Reported days since last use: 90 days for weed and 151 days for alcohol     12-Step attendance: 0 Meetings    Plan:   Patient will continue in weekly group psychotherapy sessions and individual outpatient psychotherapy sessions every 3-4 weeks and will continue in self-help meetings and seek additional treatment if necessary following completion.    Patient will continue in IOP Phase two and three group.      Safety plan has previously been discussed with patient.     BARRY Rice  [unfilled]  17:54 EDT

## 2024-04-25 NOTE — PROGRESS NOTES
[facility]  [address], [address]  [phone]    CHEMICAL DEPENDENCY   INTENSIVE OUTPATIENT PROGRAM    PHASE I  DISCHARGE SUMMARY    ATTENDING: LULU Mack.   THERAPIST: Stan Powell LCSW.    DATE OF ADMISSION: 2/26/2024 (Date of first CD-IOP class attended).     DATE OF DISCHARGE: 2/22/2024 (Date of 25th CD-IOP class attended).     REASON FOR ADMISSION: Paula Linares was referred by Court and admitted to [facility] Phase I for Alcohol use disorder, severe, dependence.     SUMMARY OF CARE, TREATMENT, and SERVICES PROVIDED: Paula Linares was assessed and determined to meet [facility] level of care on 2/19/2024. Pt began IOP on 2/26/2024 and attended 25 group therapy sessions. Pt had 0 absences. Patient tested positive for Delta-9-THC on confirmations on the following dates: 2/27/2024 and 3/21/2024.      AFTERCARE PLAN: Paula Linares completed [facility] Phase I on 4/22/2024. Patient will advance to [facility] Phase II.     Current Outpatient Medications:     albuterol sulfate  (90 Base) MCG/ACT inhaler, Inhale 2 puffs Every 4 (Four) Hours As Needed for Wheezing., Disp: 18 g, Rfl: 0    FLUoxetine (PROzac) 10 MG capsule, Take 1 capsule by mouth Daily., Disp: 30 capsule, Rfl: 1    ipratropium-albuterol (DUO-NEB) 0.5-2.5 mg/3 ml nebulizer, Nebulize q 4 h prn wheezing / shortness of breath, Disp: 360 mL, Rfl: 0    loratadine (CLARITIN) 10 MG tablet, Take 1 tablet by mouth Daily., Disp: , Rfl:     mometasone-formoterol (DULERA 100) 100-5 MCG/ACT inhaler, Inhale 2 puffs 2 (Two) Times a Day., Disp: 1 each, Rfl: 0    naloxone (NARCAN) 4 MG/0.1ML nasal spray, 1 spray into the nostril(s) as directed by provider As Needed for Opioid Reversal or Respiratory Depression. use for oversedation and call 911, Disp: 1 each, Rfl: 2    nicotine (Nicoderm CQ) 21 MG/24HR patch, Place 1 patch on the skin as directed by provider Daily., Disp:  30 patch, Rfl: 0    promethazine-dextromethorphan (PROMETHAZINE-DM) 6.25-15 MG/5ML syrup, Take 5 mL by mouth 4 (Four) Times a Day As Needed for Cough., Disp: 180 mL, Rfl: 0    PROGNOSIS: Good with continued care.     -Stan Powell, ELLIOTT.   4/25/2024.

## 2024-04-26 DIAGNOSIS — F41.1 GAD (GENERALIZED ANXIETY DISORDER): ICD-10-CM

## 2024-04-26 RX ORDER — FLUOXETINE 10 MG/1
10 CAPSULE ORAL DAILY
Qty: 30 CAPSULE | Refills: 0 | Status: SHIPPED | OUTPATIENT
Start: 2024-04-26

## 2024-04-29 ENCOUNTER — OFFICE VISIT (OUTPATIENT)
Dept: PSYCHIATRY | Facility: CLINIC | Age: 29
End: 2024-04-29
Payer: COMMERCIAL

## 2024-04-29 DIAGNOSIS — F10.20 ALCOHOL USE DISORDER, SEVERE, DEPENDENCE: Primary | ICD-10-CM

## 2024-04-29 DIAGNOSIS — F12.90 CANNABIS USE WITHOUT COMPLICATION: ICD-10-CM

## 2024-04-29 PROCEDURE — 1160F RVW MEDS BY RX/DR IN RCRD: CPT | Performed by: COUNSELOR

## 2024-04-29 PROCEDURE — 1159F MED LIST DOCD IN RCRD: CPT | Performed by: COUNSELOR

## 2024-04-29 PROCEDURE — 90853 GROUP PSYCHOTHERAPY: CPT | Performed by: COUNSELOR

## 2024-04-30 ENCOUNTER — OFFICE VISIT (OUTPATIENT)
Dept: PSYCHIATRY | Facility: CLINIC | Age: 29
End: 2024-04-30
Payer: COMMERCIAL

## 2024-04-30 VITALS
OXYGEN SATURATION: 97 % | HEIGHT: 66 IN | DIASTOLIC BLOOD PRESSURE: 78 MMHG | HEART RATE: 68 BPM | BODY MASS INDEX: 47.09 KG/M2 | WEIGHT: 293 LBS | SYSTOLIC BLOOD PRESSURE: 118 MMHG

## 2024-04-30 DIAGNOSIS — F12.90 CANNABIS USE WITHOUT COMPLICATION: ICD-10-CM

## 2024-04-30 DIAGNOSIS — F10.21 ALCOHOL USE DISORDER, SEVERE, IN EARLY REMISSION: Primary | ICD-10-CM

## 2024-04-30 DIAGNOSIS — F33.1 MODERATE EPISODE OF RECURRENT MAJOR DEPRESSIVE DISORDER: ICD-10-CM

## 2024-04-30 DIAGNOSIS — F41.1 GAD (GENERALIZED ANXIETY DISORDER): ICD-10-CM

## 2024-04-30 RX ORDER — FLUOXETINE HYDROCHLORIDE 20 MG/1
20 CAPSULE ORAL DAILY
Qty: 30 CAPSULE | Refills: 0 | Status: SHIPPED | OUTPATIENT
Start: 2024-04-30

## 2024-04-30 NOTE — PROGRESS NOTES
Office  Follow Up Visit      Patient Name: Paula Linares  : 1995   MRN: [id number]     Referring Provider: Lexy Gonzales APRN    Chief Complaint: Substance use    History of Present Illness:   Paula Linares is a 29 y.o. female who is here today for follow up related to substance use and mood.  Maintains sober date of 2023 from alcohol and  has had no further marijuana intake since 2024.  Has had 1 craving since her last follow-up after learning her best friend of about 20 years had been shot.  Use coping skills to ramon symptoms.  He is recovering well.  Denies any other recent cravings for any substance or triggering events.  Continues in IOP and recently moved to phase 2.  Finds the content helpful.  Continues on fluoxetine 10 mg daily for MDD and MALIK and is tolerating medication well.  Initially reported symptoms of being easily annoyed, constant worry, depressed mood, lack of motivation/interest, daily fatigue, and feelings of guilt at times.  All symptoms have improved with fluoxetine use.  Has been struggling a bit more over the last few weeks with increased irritability and anxiety.  Request dose increase.  Still feels she is doing much better overall but there is room for improvement.  Sleeping well most nights. Has not utilized nicotine 21 mg daily patch as yet but has cut back to 1-2 cans of tobacco per day.  Denies any SI/HI, AVH.  No other complaints today.    Triggers: Habit, to relax, stress    Cravings: Denies currently    Relapse Prevention: IOP, individual therapy as needed, psych medication management, recovery meetings    Urine Drug Screen (today's visit) discussed: 2024 negative for all substances tested on prelim    UDS Confirmation: 2024 negative for all substances tested on confirmation    DHARA (PDMP) Reviewed for Current/Active Medications: No active medications    Past Surgical History:  Past Surgical History:   Procedure Laterality  Date    ANKLE SURGERY Left     FRACTURE SURGERY  2010       Problem List:  There is no problem list on file for this patient.      Allergy:   No Known Allergies     Current Medications:   Current Outpatient Medications   Medication Sig Dispense Refill    albuterol sulfate  (90 Base) MCG/ACT inhaler Inhale 2 puffs Every 4 (Four) Hours As Needed for Wheezing. 18 g 0    FLUoxetine (PROzac) 20 MG capsule Take 1 capsule by mouth Daily. 30 capsule 0    ipratropium-albuterol (DUO-NEB) 0.5-2.5 mg/3 ml nebulizer Nebulize q 4 h prn wheezing / shortness of breath 360 mL 0    loratadine (CLARITIN) 10 MG tablet Take 1 tablet by mouth Daily.      mometasone-formoterol (DULERA 100) 100-5 MCG/ACT inhaler Inhale 2 puffs 2 (Two) Times a Day. 1 each 0    naloxone (NARCAN) 4 MG/0.1ML nasal spray 1 spray into the nostril(s) as directed by provider As Needed for Opioid Reversal or Respiratory Depression. use for oversedation and call 911 1 each 2    nicotine (Nicoderm CQ) 21 MG/24HR patch Place 1 patch on the skin as directed by provider Daily. 30 patch 0     No current facility-administered medications for this visit.       Past Medical History:  Past Medical History:   Diagnosis Date    Alcohol abuse     Sober 2 months    Alcoholism     Anxiety     Asthma     Depression     Environmental and seasonal allergies        Social History:  Social History     Socioeconomic History    Marital status: Single   Tobacco Use    Smoking status: Never     Passive exposure: Past    Smokeless tobacco: Current     Types: Snuff   Vaping Use    Vaping status: Never Used   Substance and Sexual Activity    Alcohol use: Not Currently    Drug use: Not Currently    Sexual activity: Yes     Partners: Female     Birth control/protection: Same-sex partner       Family History:  Family History   Problem Relation Age of Onset    Drug abuse Mother     Alcohol abuse Father     Alcohol abuse Maternal Grandfather     Anxiety disorder Sister     Depression  Sister          Subjective      Review of Systems:   Review of Systems   Constitutional:  Negative for chills, fatigue and fever.   Respiratory:  Negative for shortness of breath.    Cardiovascular:  Negative for chest pain.   Gastrointestinal:  Negative for abdominal pain.   Skin:  Negative for skin lesions.   Neurological:  Negative for seizures and confusion.   Psychiatric/Behavioral:  Positive for stress. Negative for hallucinations, sleep disturbance, suicidal ideas and depressed mood. The patient is nervous/anxious.        PHQ-9 Depression Screening  Little interest or pleasure in doing things? 0-->not at all   Feeling down, depressed, or hopeless? 0-->not at all   Trouble falling or staying asleep, or sleeping too much? 0-->not at all   Feeling tired or having little energy? 1-->several days   Poor appetite or overeating? 0-->not at all   Feeling bad about yourself - or that you are a failure or have let yourself or your family down? 0-->not at all   Trouble concentrating on things, such as reading the newspaper or watching television? 0-->not at all   Moving or speaking so slowly that other people could have noticed? Or the opposite - being so fidgety or restless that you have been moving around a lot more than usual? 0-->not at all   Thoughts that you would be better off dead, or of hurting yourself in some way? 0-->not at all   PHQ-9 Total Score 1   If you checked off any problems, how difficult have these problems made it for you to do your work, take care of things at home, or get along with other people? not difficult at all        MALIK-7 Score:   Feeling nervous, anxious or on edge: Not at all  Not being able to stop or control worrying: Not at all  Worrying too much about different things: Several days  Trouble Relaxing: Not at all  Being so restless that it is hard to sit still: Not at all  Feeling afraid as if something awful might happen: Not at all  Becoming easily annoyed or irritable: Several  days  MALIK 7 Total Score: 2  If you checked any problems, how difficult have these problems made it for you to do your work, take care of things at home, or get along with other people: Somewhat difficult    Patient History:   The following portions of the patient's history were reviewed and updated as appropriate: allergies, current medications, past family history, past medical history, past social history, past surgical history and problem list.     Social:  Social History     Socioeconomic History    Marital status: Single   Tobacco Use    Smoking status: Never     Passive exposure: Past    Smokeless tobacco: Current     Types: Snuff   Vaping Use    Vaping status: Never Used   Substance and Sexual Activity    Alcohol use: Not Currently    Drug use: Not Currently    Sexual activity: Yes     Partners: Female     Birth control/protection: Same-sex partner       Medications:     Current Outpatient Medications:     albuterol sulfate  (90 Base) MCG/ACT inhaler, Inhale 2 puffs Every 4 (Four) Hours As Needed for Wheezing., Disp: 18 g, Rfl: 0    FLUoxetine (PROzac) 20 MG capsule, Take 1 capsule by mouth Daily., Disp: 30 capsule, Rfl: 0    ipratropium-albuterol (DUO-NEB) 0.5-2.5 mg/3 ml nebulizer, Nebulize q 4 h prn wheezing / shortness of breath, Disp: 360 mL, Rfl: 0    loratadine (CLARITIN) 10 MG tablet, Take 1 tablet by mouth Daily., Disp: , Rfl:     mometasone-formoterol (DULERA 100) 100-5 MCG/ACT inhaler, Inhale 2 puffs 2 (Two) Times a Day., Disp: 1 each, Rfl: 0    naloxone (NARCAN) 4 MG/0.1ML nasal spray, 1 spray into the nostril(s) as directed by provider As Needed for Opioid Reversal or Respiratory Depression. use for oversedation and call 911, Disp: 1 each, Rfl: 2    nicotine (Nicoderm CQ) 21 MG/24HR patch, Place 1 patch on the skin as directed by provider Daily., Disp: 30 patch, Rfl: 0    Objective     Physical Exam:  Physical Exam  Vitals reviewed.   Constitutional:       General: She is not in acute  "distress.     Appearance: She is well-developed. She is not ill-appearing.   Pulmonary:      Effort: No respiratory distress.   Neurological:      Mental Status: She is alert and oriented to person, place, and time.      Gait: Gait normal.   Psychiatric:         Attention and Perception: Attention normal.         Mood and Affect: Mood and affect normal.         Speech: Speech normal.         Behavior: Behavior normal. Behavior is cooperative.         Thought Content: Thought content is not paranoid or delusional. Thought content does not include homicidal or suicidal ideation. Thought content does not include homicidal or suicidal plan.         Cognition and Memory: Cognition and memory normal.         Vital Signs:   Vitals:    04/30/24 1404   BP: 118/78   Pulse: 68   SpO2: 97%   Weight: 135 kg (297 lb 9.6 oz)   Height: 167.6 cm (66\")     Body mass index is 48.03 kg/m².     Mental Status Exam: Reviewed 4/30/2024  Hygiene:   good  Cooperation:  Cooperative  Eye Contact:  Good  Psychomotor Behavior:  Appropriate  Affect:  Full range  Mood: normal  Speech:  Normal  Thought Process:  Goal directed  Thought Content:  Normal  Suicidal:  None  Homicidal:  None  Hallucinations:  None  Delusion:  None  Memory:  Intact  Orientation:  Person, Place, Time, and Situation  Reliability:  good  Insight:  Good  Judgement:  Fair  Impulse Control:  Fair    Assessment / Plan    -Paula continues to do really well in her recovery.  Is an active participant in IOP and finds the content helpful.  Will start recovery meetings soon.  -Increase fluoxetine to 20 mg daily for MDD and MALIK.  Discussed that fluoxetine can be activating and to call if it worsens any symptoms.  Again discussed AEM medication and when to call/RTC.  -Encouraged her again to utilize nicotine 21 mg every 24 hour patch as directed.  Do not dip with patch on.  Has been cutting back.  Discussed AE of medication and when to call/RTC.  -Advisement to take part in and " remain active in 12 Step Recovery Meetings, IOP, and/or 1:1 therapy/counseling and to establish/maintain an active relationship with a recovery sponsor as part of long-term recovery care.   -Continued monitoring for illicit substances for patient safety, medication compliance and future care  -Does not need Narcan refill today      Assessment & Plan   Problems Addressed this Visit    None  Visit Diagnoses       Alcohol use disorder, severe, in early remission    -  Primary    MALIK (generalized anxiety disorder)        Relevant Medications    FLUoxetine (PROzac) 20 MG capsule    Cannabis use without complication        Moderate episode of recurrent major depressive disorder        Relevant Medications    FLUoxetine (PROzac) 20 MG capsule          Diagnoses         Codes Comments    Alcohol use disorder, severe, in early remission    -  Primary ICD-10-CM: F10.21  ICD-9-CM: 303.93     MALIK (generalized anxiety disorder)     ICD-10-CM: F41.1  ICD-9-CM: 300.02     Cannabis use without complication     ICD-10-CM: F12.90  ICD-9-CM: 305.20     Moderate episode of recurrent major depressive disorder     ICD-10-CM: F33.1  ICD-9-CM: 296.32               PLAN:  Safety: No acute safety concerns  Risk Assessment: Risk of self-harm acutely is low. Risk of self-harm chronically is also low, but could be further elevated in the event of treatment noncompliance and/or AODA.      TREATMENT PLAN/GOALS: Continue supportive psychotherapy efforts and medications as indicated. Treatment and medication options discussed during today's visit. Patient acknowledged and verbally consented to continue with current treatment plan and was educated on the importance of compliance with treatment and follow-up appointments.      MEDICATION ISSUES:  DHARA reviewed as expected.  Discussed medication options and treatment plan of prescribed medication as well as the risks, benefits, and side effects including potential falls, possible impaired driving  and metabolic adversities among others. Patient is agreeable to call the office with any worsening of symptoms or onset of side effects. Patient is agreeable to call 911 or go to the nearest ER should he/she begin having SI/HI. No medication side effects or related complaints today.     MEDS ORDERED DURING VISIT:  New Medications Ordered This Visit   Medications    FLUoxetine (PROzac) 20 MG capsule     Sig: Take 1 capsule by mouth Daily.     Dispense:  30 capsule     Refill:  0       Return in about 4 weeks (around 5/28/2024) for Follow Up Medication.             This document has been electronically signed by LULU Mack  April 30, 2024 15:03 EDT      Part of this note may be an electronic transcription/translation of spoken language to printed text using the Dragon Dictation System.

## 2024-04-30 NOTE — PROGRESS NOTES
"[facility] PHASE II / Phase III GROUP NOTE    Name: Paula Linares  Date: April 29, 2024    Time in:?3:30   Time out:?4:30   Participants: 6     Data: 1?hour group therapy session (Check ins and relapse prevention)      Clinical Maneuvering/Interventions:?Therapist utilized a person-centered, Motivation interviewing, and CBT approaches to build rapport, exploring and resolving ambivalence associated with commitment to change behaviors related to substance use with and addiction, group member.?Therapist implemented motivational interviewing techniques to assist client with, facilitate identification of maladaptive patterns of thinking and behavior that contribute to client's risk for continued substance use and relapse.?Therapist employed group interaction activities to build rapport among group members, promote sobriety, and emphasize relapse prevention.?Therapist promoted a safe nonjudgmental environment by providing group members with unconditional positive regard and encouraging group members to comply with group rules and guidelines. Therapist assisted group member with identifying and implementing healthier coping strategies.      Response:?Patient attended class in person. Patient participated in completion of check in form. Patient on check in form answered no to question \"currently or since last group meeting,?have you had any suicidal thoughts or plan or intent to hurt yourself”, and patient also answered no to question of \"currently or since your last group meeting, have you had any homicidal thoughts or plan or intent to hurt others\". Patient participated fully in group discussion by providing positive feedback to other members of the group. Patient was able to recognize coping skills that she has and the goals she is working towards with sobriety.    On a 1:10 Scale (0-none, 10-high):    Anxiety: 0  Depression: 0  Cravings: 0    Assessment     Diagnoses and all orders for this visit:    1. Alcohol use " disorder, severe, dependence (Primary)    2. Cannabis use without complication             Progress toward goal: At goal    Functional Status: Moderate impairment     Prognosis: Good with Ongoing Treatment     Mental Status Exam:   Hygiene:   good  Cooperation:  Cooperative  Eye Contact:  Good  Psychomotor Behavior:  Appropriate  Affect:  Appropriate  Mood: normal  Speech:  Normal  Thought Process:  Linear  Thought Content:  Mood congruent  Suicidal:  None  Homicidal:  None  Hallucinations:  None  Delusion:  None  Memory:  Intact  Orientation:  Person, Place, Time, and Situation  Reliability:  fair  Insight:  Fair  Judgement:  Fair  Impulse Control:  Fair  Physical/Medical Issues:  No      Assessment:  Engaged in activity/Process and self-disclosed: Yes  Affect:Appropriate   Applies topic to self: Yes  Able to give and receive feedback: Yes    Urinalysis: Negative  on prelim result dated 04/25/2024  Labs:   Last Urine Toxicity  More data exists         Latest Ref Rng & Units 4/25/2024 4/18/2024   LAST URINE TOXICITY RESULTS   Amphetamine, Urine Qual Negative Negative  Negative    Barbiturates Screen, Urine Negative Negative  Negative    Benzodiazepine Screen, Urine Negative Negative  Negative    Buprenorphine, Screen, Urine Negative Negative  Negative    Cocaine Screen, Urine Negative Negative  Negative    Methadone Screen , Urine Negative Negative  Negative    Methamphetamine, Ur Negative Negative  Negative         Self-Reported days since last use: 94 days for Cannabis, and 155 for Alcohol    12-Step attendance: 0 Meetings    Plan:   Patient will continue in weekly group psychotherapy sessions and individual outpatient psychotherapy sessions every 3-4 weeks and will continue in self-help meetings and seek additional treatment if necessary following completion.    Patient will continue in IOP Phase two and three group.      Safety plan has previously been discussed with patient.     BARRY Rice  [unfilled]  20:24  EDT

## 2024-05-01 ENCOUNTER — LAB (OUTPATIENT)
Dept: LAB | Facility: HOSPITAL | Age: 29
End: 2024-05-01
Payer: COMMERCIAL

## 2024-05-01 DIAGNOSIS — F10.20 ALCOHOL USE DISORDER, SEVERE, DEPENDENCE: ICD-10-CM

## 2024-05-01 LAB
AMPHET+METHAMPHET UR QL: NEGATIVE
AMPHETAMINES UR QL: NEGATIVE
BARBITURATES UR QL SCN: NEGATIVE
BENZODIAZ UR QL SCN: NEGATIVE
BUPRENORPHINE SERPL-MCNC: NEGATIVE NG/ML
CANNABINOIDS SERPL QL: NEGATIVE
COCAINE UR QL: NEGATIVE
METHADONE UR QL SCN: NEGATIVE
OPIATES UR QL: NEGATIVE
OXYCODONE UR QL SCN: NEGATIVE
PCP UR QL SCN: NEGATIVE
REF LAB TEST METHOD: NORMAL
TRICYCLICS UR QL SCN: NEGATIVE

## 2024-05-01 PROCEDURE — 80306 DRUG TEST PRSMV INSTRMNT: CPT

## 2024-05-01 NOTE — PROGRESS NOTES
[facility] IOP GROUP     Date: 04/17/2024  Name: Paula Linares    Time In: 1800   Time Out: 2040     Number of participants: 11    IOP GROUP NOTE     Data: 3 hour IOP group therapy session (Check-ins, Coping Skills, Relapse Prevention)     Check Ins: Therapist continued facilitation of rapport building strategies between group members. Therapist asked that each patient check in with home life and recovery efforts and identify triggers, cravings, and high risk situations that arise between group sessions. Therapist provided empathy and support during group session.     Session Content/Coping Skills: Clinician discussed with group members the CD-IOP Alumni group that is offered at the completion of CD-IOP Phase III. Check ins completed by group members. Coping Skills log provided to group members and clinician discussed how to utilize the coping skills log. Therapist Aid- Coping Skills Addiction psychoeducational material reviewed and discussed. Outdoor 5 senses grounding activity completed by group members. Group members shared their activity and experiences with the activity discussed.     Response: Patient attended class in person. Patient participated in completion of check in form. Patient on check in form answered no to question of “currently or since your last group meeting, have you had any suicidal thoughts or plan or intent to hurt yourself”, and patient also answered no on check in form to question of “currently or since your last group meeting, have you had any homicidal thoughts or plan or intent to hurt others”.     Personal Assessment 0-10 Scale (0-none, 10-high)    Anxiety:  0   Depression:  0   Cravings: 0     Assessment:     ..  Lab on 04/10/2024   Component Date Value Ref Range Status    THC, Screen, Urine 04/10/2024 Negative  Negative Final    Phencyclidine (PCP), Urine 04/10/2024 Negative  Negative Final    Cocaine Screen, Urine 04/10/2024 Negative  Negative Final    Methamphetamine, Ur  04/10/2024 Negative  Negative Final    Opiate Screen 04/10/2024 Negative  Negative Final    Amphetamine Screen, Urine 04/10/2024 Negative  Negative Final    Benzodiazepine Screen, Urine 04/10/2024 Negative  Negative Final    Tricyclic Antidepressants Screen 04/10/2024 Negative  Negative Final    Methadone Screen, Urine 04/10/2024 Negative  Negative Final    Barbiturates Screen, Urine 04/10/2024 Negative  Negative Final    Oxycodone Screen, Urine 04/10/2024 Negative  Negative Final    Buprenorphine, Screen, Urine 04/10/2024 Negative  Negative Final    Reference Lab Report 04/10/2024 See scanned report   Final   Lab on 04/03/2024   Component Date Value Ref Range Status    THC, Screen, Urine 04/03/2024 Negative  Negative Final    Phencyclidine (PCP), Urine 04/03/2024 Negative  Negative Final    Cocaine Screen, Urine 04/03/2024 Negative  Negative Final    Methamphetamine, Ur 04/03/2024 Negative  Negative Final    Opiate Screen 04/03/2024 Negative  Negative Final    Amphetamine Screen, Urine 04/03/2024 Negative  Negative Final    Benzodiazepine Screen, Urine 04/03/2024 Negative  Negative Final    Tricyclic Antidepressants Screen 04/03/2024 Negative  Negative Final    Methadone Screen, Urine 04/03/2024 Negative  Negative Final    Barbiturates Screen, Urine 04/03/2024 Negative  Negative Final    Oxycodone Screen, Urine 04/03/2024 Negative  Negative Final    Buprenorphine, Screen, Urine 04/03/2024 Negative  Negative Final    Reference Lab Report 04/03/2024 See scanned report   Final       Mental Status Exam  Hygiene:  good  Dress: casual  Attitude: cooperative and agreeable   Motor Activity: appropriate  Eye Contact:  good  Speech: regular rate and rhythm   Mood:  calm and cooperative  Affect:  Appropriate  Thought Processes:  Linear  Thought Content:  Normal  Suicidal Thoughts:  denies  Homicidal Thoughts:  denies  Crisis Safety Plan: Safety plan has been discussed.   Hallucinations:  Unknown to clinician.   Reliability:  fair  Insight: fair  Judgement: fair  Impulse Control: fair    Recovery/spiritual support group attendance: No.     Progress toward goal: Not at goal    Prognosis: Fair with Ongoing Treatment     Self-reported number of days sober: Patient on check in form reported [place] 82 days/[place] 143 days.     Patient will contact this office, call 911 or present to the nearest emergency room should suicidal or homicidal ideations occur.    Impression/Formulation:    ICD-10-CM ICD-9-CM   1. Alcohol use disorder, severe, dependence  F10.20 303.90       Clinical Maneuvering/Interventions: Therapist utilized a person-centered approach to build rapport with group member. Therapist implemented motivational interviewing techniques to assist client with exploring and resolving ambivalence associated with commitment to change behaviors related to substance use and addiction. Therapist applied cognitive behavioral strategies to facilitate identification of maladaptive patterns of thinking and behavior that contribute to client's risk for continued substance use and relapse. Therapist employed group interaction activities to build rapport among group members, promote sobriety, and emphasize relapse prevention. Therapist promoted safe nonjudgmental environment by providing group members with unconditional positive regard and encouraging group members to comply with group rules and guidelines. Therapist assisted group member with identifying and implementing healthier coping strategies.      Plan:  Continue [facility] IOP Phase I   Aftercare:  [facility] Phase II  Program Assignments:  Personal recovery plan, relapse prevention plan, attendance of recovery support group meetings, exploration of sponsorship, drug/alcohol screens.     Stan Powell LCSW  5/1/2024  17:48 EDT

## 2024-05-01 NOTE — PROGRESS NOTES
[facility] GROUP     Date: 04/18/2024  Name: Paula Linares    Time In: 1800   Time Out: Approximately 2045      Number of participants: 12    IOP GROUP NOTE     Data: 3 hour IOP group therapy session (Check-ins, Coping Skills, Relapse Prevention)     Check Ins: Therapist continued facilitation of rapport building strategies between group members. Therapist asked that each patient check in with home life and recovery efforts and identify triggers, cravings, and high risk situations that arise between group sessions. Therapist provided empathy and support during group session.     Session Content/Coping Skills: Guest speaker (a former member of -[facility]) attended for first part of group to share their recovery story. Check ins completed by group members. Clinician facilitated discussion that covered coping with mental cravings, lifestyle changes, rebuilding relationships, and dating in early recovery. Clinician explored with group members their strategies for staying sober over the upcoming weekend.     Response: Patient attended class in person. Patient participated in completion of check in form. Patient on check in form answered no to question of “currently or since your last group meeting, have you had any suicidal thoughts or plan or intent to hurt yourself”, and patient also answered no on check in form to question of “currently or since your last group meeting, have you had any homicidal thoughts or plan or intent to hurt others”. Patient during individual check in denied suicidal or homicidal thoughts.     Personal Assessment 0-10 Scale (0-none, 10-high)    Anxiety:  0   Depression:  0   Cravings: 0     Assessment:     ..  Lab on 04/18/2024   Component Date Value Ref Range Status    THC, Screen, Urine 04/18/2024 Negative  Negative Final    Phencyclidine (PCP), Urine 04/18/2024 Negative  Negative Final    Cocaine Screen, Urine 04/18/2024 Negative  Negative Final    Methamphetamine, Ur 04/18/2024 Negative   Negative Final    Opiate Screen 04/18/2024 Negative  Negative Final    Amphetamine Screen, Urine 04/18/2024 Negative  Negative Final    Benzodiazepine Screen, Urine 04/18/2024 Negative  Negative Final    Tricyclic Antidepressants Screen 04/18/2024 Negative  Negative Final    Methadone Screen, Urine 04/18/2024 Negative  Negative Final    Barbiturates Screen, Urine 04/18/2024 Negative  Negative Final    Oxycodone Screen, Urine 04/18/2024 Negative  Negative Final    Buprenorphine, Screen, Urine 04/18/2024 Negative  Negative Final    Reference Lab Report 04/18/2024 See scanned report   Final   Lab on 04/10/2024   Component Date Value Ref Range Status    THC, Screen, Urine 04/10/2024 Negative  Negative Final    Phencyclidine (PCP), Urine 04/10/2024 Negative  Negative Final    Cocaine Screen, Urine 04/10/2024 Negative  Negative Final    Methamphetamine, Ur 04/10/2024 Negative  Negative Final    Opiate Screen 04/10/2024 Negative  Negative Final    Amphetamine Screen, Urine 04/10/2024 Negative  Negative Final    Benzodiazepine Screen, Urine 04/10/2024 Negative  Negative Final    Tricyclic Antidepressants Screen 04/10/2024 Negative  Negative Final    Methadone Screen, Urine 04/10/2024 Negative  Negative Final    Barbiturates Screen, Urine 04/10/2024 Negative  Negative Final    Oxycodone Screen, Urine 04/10/2024 Negative  Negative Final    Buprenorphine, Screen, Urine 04/10/2024 Negative  Negative Final    Reference Lab Report 04/10/2024 See scanned report   Final   Lab on 04/03/2024   Component Date Value Ref Range Status    THC, Screen, Urine 04/03/2024 Negative  Negative Final    Phencyclidine (PCP), Urine 04/03/2024 Negative  Negative Final    Cocaine Screen, Urine 04/03/2024 Negative  Negative Final    Methamphetamine, Ur 04/03/2024 Negative  Negative Final    Opiate Screen 04/03/2024 Negative  Negative Final    Amphetamine Screen, Urine 04/03/2024 Negative  Negative Final    Benzodiazepine Screen, Urine 04/03/2024  Negative  Negative Final    Tricyclic Antidepressants Screen 04/03/2024 Negative  Negative Final    Methadone Screen, Urine 04/03/2024 Negative  Negative Final    Barbiturates Screen, Urine 04/03/2024 Negative  Negative Final    Oxycodone Screen, Urine 04/03/2024 Negative  Negative Final    Buprenorphine, Screen, Urine 04/03/2024 Negative  Negative Final    Reference Lab Report 04/03/2024 See scanned report   Final       Mental Status Exam  Hygiene:  good  Dress: casual  Attitude: cooperative and agreeable   Motor Activity: appropriate  Eye Contact:  good  Speech: regular rate and rhythm   Mood:  calm and cooperative  Affect:  Appropriate  Thought Processes:  Linear  Thought Content:  Normal  Suicidal Thoughts:  denies  Homicidal Thoughts:  denies  Crisis Safety Plan: Safety plan has been discussed.   Hallucinations:  Unknown to clinician.   Reliability: fair  Insight: fair  Judgement: fair  Impulse Control: fair    Recovery/spiritual support group attendance: No.      Progress toward goal: Not at goal    Prognosis: Fair with Ongoing Treatment     Self-reported number of days sober: Patient on check in form reported Weed 83 days/ Alc 144 days.     Patient will contact this office, call 911 or present to the nearest emergency room should suicidal or homicidal ideations occur.    Impression/Formulation:    ICD-10-CM ICD-9-CM   1. Alcohol use disorder, severe, dependence  F10.20 303.90       Clinical Maneuvering/Interventions: Therapist utilized a person-centered approach to build rapport with group member. Therapist implemented motivational interviewing techniques to assist client with exploring and resolving ambivalence associated with commitment to change behaviors related to substance use and addiction. Therapist applied cognitive behavioral strategies to facilitate identification of maladaptive patterns of thinking and behavior that contribute to client's risk for continued substance use and relapse. Therapist  employed group interaction activities to build rapport among group members, promote sobriety, and emphasize relapse prevention. Therapist promoted safe nonjudgmental environment by providing group members with unconditional positive regard and encouraging group members to comply with group rules and guidelines. Therapist assisted group member with identifying and implementing healthier coping strategies.      Plan:  Continue [facility] IOP Phase I   Aftercare:  [facility] Phase II  Program Assignments:  Personal recovery plan, relapse prevention plan, attendance of recovery support group meetings, exploration of sponsorship, drug/alcohol screens.     Stan Powell LCSW  5/1/2024  19:31 EDT

## 2024-05-02 ENCOUNTER — OFFICE VISIT (OUTPATIENT)
Dept: PSYCHIATRY | Facility: CLINIC | Age: 29
End: 2024-05-02
Payer: COMMERCIAL

## 2024-05-02 DIAGNOSIS — F10.21 ALCOHOL USE DISORDER, SEVERE, IN EARLY REMISSION: Primary | ICD-10-CM

## 2024-05-02 DIAGNOSIS — F12.90 CANNABIS USE WITHOUT COMPLICATION: ICD-10-CM

## 2024-05-02 PROCEDURE — 90853 GROUP PSYCHOTHERAPY: CPT | Performed by: COUNSELOR

## 2024-05-02 PROCEDURE — 1160F RVW MEDS BY RX/DR IN RCRD: CPT | Performed by: COUNSELOR

## 2024-05-02 PROCEDURE — 1159F MED LIST DOCD IN RCRD: CPT | Performed by: COUNSELOR

## 2024-05-02 NOTE — PROGRESS NOTES
[facility] IOP GROUP     Date: 04/22/2024  Name: Paula Linares    Time In: 1800   Time Out: Approximately 2040     Number of participants: 15    IOP GROUP NOTE     Data: 3 hour IOP group therapy session (Check-ins, Coping Skills, Relapse Prevention)     Check Ins: Therapist continued facilitation of rapport building strategies between group members. Therapist asked that each patient check in with home life and recovery efforts and identify triggers, cravings, and high risk situations that arise between group sessions. Therapist provided empathy and support during group session.     Session Content/Coping Skills: [employer] student attended session. Aime [facility]; [facility]; Juan, [facility] Liaison; and Kaia,  joined for first part of session. Check is completed by group members. Group members were assigned a partner and participated in a tower team building activity. After activity, clinician facilitated discussion on the importance of teamwork and communication. Just for Today reading reviewed and discussed with group members. YouTube video “The Road to Recovery” viewed ([employer]). Concepts from the video were discussed with group members.     Response: Patient attended class in person. Patient participated in completion of check in form. Patient on check in form answered no to question of “currently or since your last group meeting, have you had any suicidal thoughts or plan or intent to hurt yourself”, and patient also answered no on check in form to question of “currently or since your last group meeting, have you had any homicidal thoughts or plan or intent to hurt others”.     Patient completed PHQ-9 and scored a 00.   Patient completed MALIK-7 and scored a 01.    Patient identified coping skills of praying, talking to wife, walking, talk to sister, and fishing.       Personal Assessment 0-10 Scale (0-none, 10-high)    Anxiety:  0   Depression:  0    Cravings: 0     Assessment:     ..  Lab on 04/18/2024   Component Date Value Ref Range Status    THC, Screen, Urine 04/18/2024 Negative  Negative Final    Phencyclidine (PCP), Urine 04/18/2024 Negative  Negative Final    Cocaine Screen, Urine 04/18/2024 Negative  Negative Final    Methamphetamine, Ur 04/18/2024 Negative  Negative Final    Opiate Screen 04/18/2024 Negative  Negative Final    Amphetamine Screen, Urine 04/18/2024 Negative  Negative Final    Benzodiazepine Screen, Urine 04/18/2024 Negative  Negative Final    Tricyclic Antidepressants Screen 04/18/2024 Negative  Negative Final    Methadone Screen, Urine 04/18/2024 Negative  Negative Final    Barbiturates Screen, Urine 04/18/2024 Negative  Negative Final    Oxycodone Screen, Urine 04/18/2024 Negative  Negative Final    Buprenorphine, Screen, Urine 04/18/2024 Negative  Negative Final    Reference Lab Report 04/18/2024 See scanned report   Final   Lab on 04/10/2024   Component Date Value Ref Range Status    THC, Screen, Urine 04/10/2024 Negative  Negative Final    Phencyclidine (PCP), Urine 04/10/2024 Negative  Negative Final    Cocaine Screen, Urine 04/10/2024 Negative  Negative Final    Methamphetamine, Ur 04/10/2024 Negative  Negative Final    Opiate Screen 04/10/2024 Negative  Negative Final    Amphetamine Screen, Urine 04/10/2024 Negative  Negative Final    Benzodiazepine Screen, Urine 04/10/2024 Negative  Negative Final    Tricyclic Antidepressants Screen 04/10/2024 Negative  Negative Final    Methadone Screen, Urine 04/10/2024 Negative  Negative Final    Barbiturates Screen, Urine 04/10/2024 Negative  Negative Final    Oxycodone Screen, Urine 04/10/2024 Negative  Negative Final    Buprenorphine, Screen, Urine 04/10/2024 Negative  Negative Final    Reference Lab Report 04/10/2024 See scanned report   Final   Lab on 04/03/2024   Component Date Value Ref Range Status    THC, Screen, Urine 04/03/2024 Negative  Negative Final    Phencyclidine (PCP),  Urine 04/03/2024 Negative  Negative Final    Cocaine Screen, Urine 04/03/2024 Negative  Negative Final    Methamphetamine, Ur 04/03/2024 Negative  Negative Final    Opiate Screen 04/03/2024 Negative  Negative Final    Amphetamine Screen, Urine 04/03/2024 Negative  Negative Final    Benzodiazepine Screen, Urine 04/03/2024 Negative  Negative Final    Tricyclic Antidepressants Screen 04/03/2024 Negative  Negative Final    Methadone Screen, Urine 04/03/2024 Negative  Negative Final    Barbiturates Screen, Urine 04/03/2024 Negative  Negative Final    Oxycodone Screen, Urine 04/03/2024 Negative  Negative Final    Buprenorphine, Screen, Urine 04/03/2024 Negative  Negative Final    Reference Lab Report 04/03/2024 See scanned report   Final       Mental Status Exam  Hygiene:  good  Dress: casual  Attitude: cooperative and agreeable   Motor Activity: appropriate  Eye Contact:  good  Speech: regular rate and rhythm   Mood:  calm and cooperative  Affect:  Appropriate  Thought Processes:  Linear  Thought Content:  Normal  Suicidal Thoughts:  denies  Homicidal Thoughts:  denies  Crisis Safety Plan: Safety plan has been discussed.   Hallucinations:  Unknown to clinician.   Reliability: fair  Insight: fair  Judgement: fair  Impulse Control: fair    Recovery/spiritual support group attendance: No.     Progress toward goal: Not at goal    Prognosis: Fair with Ongoing Treatment     Self-reported number of days sober: Patient on check in form reported weed 87 days/ Alc 148.    Patient will contact this office, call 911 or present to the nearest emergency room should suicidal or homicidal ideations occur.    Impression/Formulation:    ICD-10-CM ICD-9-CM   1. Alcohol use disorder, severe, dependence  F10.20 303.90       Clinical Maneuvering/Interventions: Therapist utilized a person-centered approach to build rapport with group member. Therapist implemented motivational interviewing techniques to assist client with exploring and  resolving ambivalence associated with commitment to change behaviors related to substance use and addiction. Therapist applied cognitive behavioral strategies to facilitate identification of maladaptive patterns of thinking and behavior that contribute to client's risk for continued substance use and relapse. Therapist employed group interaction activities to build rapport among group members, promote sobriety, and emphasize relapse prevention. Therapist promoted safe nonjudgmental environment by providing group members with unconditional positive regard and encouraging group members to comply with group rules and guidelines. Therapist assisted group member with identifying and implementing healthier coping strategies.      Plan: Patient has completed CD-IOP Phase I and will advance to CD-IOP Phase II.   Aftercare:  [facility] Phase II  Program Assignments:  Personal recovery plan, relapse prevention plan, attendance of recovery support group meetings, exploration of sponsorship, drug/alcohol screens.     Stan Powell LCSW  5/2/2024  08:58 EDT

## 2024-05-03 NOTE — PROGRESS NOTES
"[facility] PHASE II / Phase III GROUP NOTE    Name: Paula Linares  Date: May 2, 2024    Time in:?3:30   Time out:?4:30   Participants: 8     Data: 1?hour group therapy session (Check ins and values)      Clinical Maneuvering/Interventions:?Therapist utilized a person-centered, Motivation interviewing, and CBT approaches to build rapport, exploring and resolving ambivalence associated with commitment to change behaviors related to substance use with and addiction, group member.?Therapist implemented motivational interviewing techniques to assist client with, facilitate identification of maladaptive patterns of thinking and behavior that contribute to client's risk for continued substance use and relapse.?Therapist employed group interaction activities to build rapport among group members, promote sobriety, and emphasize relapse prevention.?Therapist promoted a safe nonjudgmental environment by providing group members with unconditional positive regard and encouraging group members to comply with group rules and guidelines. Therapist assisted group member with identifying and implementing healthier coping strategies.      Response:?Patient attended class in person. Patient participated in completion of check in form. Patient on check in form answered no to question \"currently or since last group meeting,?have you had any suicidal thoughts or plan or intent to hurt yourself”, and patient also answered no to question of \"currently or since your last group meeting, have you had any homicidal thoughts or plan or intent to hurt others\".  Patient participated in group discussion by identifying some values that she has and how they help her maintain her sobriety.    On a 1:10 Scale (0-none, 10-high):    Anxiety: 0  Depression: 0  Cravings: 0    Assessment     Diagnoses and all orders for this visit:    1. Alcohol use disorder, severe, in early remission (Primary)    2. Cannabis use without complication             Progress " toward goal: At goal    Functional Status: Moderate impairment     Prognosis: Good with Ongoing Treatment     Mental Status Exam:   Hygiene:   good  Cooperation:  Cooperative  Eye Contact:  Good  Psychomotor Behavior:  Appropriate  Affect:  Appropriate  Mood: normal  Speech:  Normal  Thought Process:  Linear  Thought Content:  Normal  Suicidal:  None  Homicidal:  None  Hallucinations:  None  Delusion:  None  Memory:  Intact  Orientation:  Person, Place, Time, and Situation  Reliability:  fair  Insight:  Fair  Judgement:  Fair  Impulse Control:  Fair  Physical/Medical Issues:  No      Assessment:  Engaged in activity/Process and self-disclosed: Yes  Affect:Appropriate   Applies topic to self: Yes  Able to give and receive feedback: Yes    Urinalysis: Negative on prelim results dated 5/1/2024  Labs:   Last Urine Toxicity  More data exists         Latest Ref Rng & Units 5/1/2024 4/25/2024   LAST URINE TOXICITY RESULTS   Amphetamine, Urine Qual Negative Negative  Negative    Barbiturates Screen, Urine Negative Negative  Negative    Benzodiazepine Screen, Urine Negative Negative  Negative    Buprenorphine, Screen, Urine Negative Negative  Negative    Cocaine Screen, Urine Negative Negative  Negative    Methadone Screen , Urine Negative Negative  Negative    Methamphetamine, Ur Negative Negative  Negative         Self-Reported days since last use: 97 days from THC and 158 days from alcohol    12-Step attendance: 0 meetings    Plan:   Patient will continue in weekly group psychotherapy sessions and individual outpatient psychotherapy sessions every 3-4 weeks and will continue in self-help meetings and seek additional treatment if necessary following completion.    Patient will continue in IOP Phase two and three group.      Safety plan has previously been discussed with patient.     BARRY Rice  [unfilled]  11:33 EDT

## 2024-05-06 ENCOUNTER — OFFICE VISIT (OUTPATIENT)
Dept: PSYCHIATRY | Facility: CLINIC | Age: 29
End: 2024-05-06
Payer: COMMERCIAL

## 2024-05-06 DIAGNOSIS — F12.90 CANNABIS USE WITHOUT COMPLICATION: ICD-10-CM

## 2024-05-06 DIAGNOSIS — F10.21 ALCOHOL USE DISORDER, SEVERE, IN EARLY REMISSION: Primary | ICD-10-CM

## 2024-05-06 LAB — REF LAB TEST METHOD: NORMAL

## 2024-05-06 PROCEDURE — 90853 GROUP PSYCHOTHERAPY: CPT | Performed by: COUNSELOR

## 2024-05-06 PROCEDURE — 1160F RVW MEDS BY RX/DR IN RCRD: CPT | Performed by: COUNSELOR

## 2024-05-06 PROCEDURE — 1159F MED LIST DOCD IN RCRD: CPT | Performed by: COUNSELOR

## 2024-05-09 ENCOUNTER — LAB (OUTPATIENT)
Dept: LAB | Facility: HOSPITAL | Age: 29
End: 2024-05-09
Payer: COMMERCIAL

## 2024-05-09 ENCOUNTER — OFFICE VISIT (OUTPATIENT)
Dept: PSYCHIATRY | Facility: CLINIC | Age: 29
End: 2024-05-09
Payer: COMMERCIAL

## 2024-05-09 DIAGNOSIS — F12.90 CANNABIS USE WITHOUT COMPLICATION: ICD-10-CM

## 2024-05-09 DIAGNOSIS — F10.20 ALCOHOL USE DISORDER, SEVERE, DEPENDENCE: ICD-10-CM

## 2024-05-09 DIAGNOSIS — F10.21 ALCOHOL USE DISORDER, SEVERE, IN EARLY REMISSION: Primary | ICD-10-CM

## 2024-05-09 PROCEDURE — 90853 GROUP PSYCHOTHERAPY: CPT | Performed by: COUNSELOR

## 2024-05-09 PROCEDURE — 80306 DRUG TEST PRSMV INSTRMNT: CPT

## 2024-05-09 PROCEDURE — 1160F RVW MEDS BY RX/DR IN RCRD: CPT | Performed by: COUNSELOR

## 2024-05-09 PROCEDURE — 1159F MED LIST DOCD IN RCRD: CPT | Performed by: COUNSELOR

## 2024-05-13 ENCOUNTER — OFFICE VISIT (OUTPATIENT)
Dept: PSYCHIATRY | Facility: CLINIC | Age: 29
End: 2024-05-13
Payer: COMMERCIAL

## 2024-05-13 DIAGNOSIS — F10.21 ALCOHOL USE DISORDER, SEVERE, IN EARLY REMISSION: Primary | ICD-10-CM

## 2024-05-16 ENCOUNTER — OFFICE VISIT (OUTPATIENT)
Dept: PSYCHIATRY | Facility: CLINIC | Age: 29
End: 2024-05-16
Payer: COMMERCIAL

## 2024-05-16 ENCOUNTER — LAB (OUTPATIENT)
Dept: LAB | Facility: HOSPITAL | Age: 29
End: 2024-05-16
Payer: COMMERCIAL

## 2024-05-16 DIAGNOSIS — F10.21 ALCOHOL USE DISORDER, SEVERE, IN EARLY REMISSION: Primary | ICD-10-CM

## 2024-05-16 DIAGNOSIS — F10.20 ALCOHOL USE DISORDER, SEVERE, DEPENDENCE: ICD-10-CM

## 2024-05-16 PROCEDURE — 90853 GROUP PSYCHOTHERAPY: CPT | Performed by: SOCIAL WORKER

## 2024-05-16 PROCEDURE — 80306 DRUG TEST PRSMV INSTRMNT: CPT

## 2024-05-20 ENCOUNTER — OFFICE VISIT (OUTPATIENT)
Dept: PSYCHIATRY | Facility: CLINIC | Age: 29
End: 2024-05-20
Payer: COMMERCIAL

## 2024-05-20 ENCOUNTER — LAB (OUTPATIENT)
Dept: LAB | Facility: HOSPITAL | Age: 29
End: 2024-05-20
Payer: COMMERCIAL

## 2024-05-20 DIAGNOSIS — F10.21 ALCOHOL USE DISORDER, SEVERE, IN EARLY REMISSION: Primary | ICD-10-CM

## 2024-05-20 DIAGNOSIS — F10.20 ALCOHOL USE DISORDER, SEVERE, DEPENDENCE: ICD-10-CM

## 2024-05-20 PROCEDURE — 80306 DRUG TEST PRSMV INSTRMNT: CPT

## 2024-05-20 PROCEDURE — 90853 GROUP PSYCHOTHERAPY: CPT | Performed by: SOCIAL WORKER

## 2024-05-21 LAB — REF LAB TEST METHOD: NORMAL

## 2024-05-23 ENCOUNTER — OFFICE VISIT (OUTPATIENT)
Dept: PSYCHIATRY | Facility: CLINIC | Age: 29
End: 2024-05-23
Payer: COMMERCIAL

## 2024-05-23 DIAGNOSIS — F10.21 ALCOHOL USE DISORDER, SEVERE, IN EARLY REMISSION: Primary | ICD-10-CM

## 2024-05-23 PROCEDURE — 90853 GROUP PSYCHOTHERAPY: CPT | Performed by: SOCIAL WORKER

## 2024-05-24 NOTE — PROGRESS NOTES
[facility] IOP GROUP     Date: 05/13/2024  Name: Paula Linares    Time In: 1530   Time Out: 1627     Number of participants: 6    IOP GROUP NOTE     Data: 1 hour IOP group therapy session (Check-ins, Coping Skills, Relapse Prevention)     Check Ins: Therapist continued facilitation of rapport building strategies between group members. Therapist asked that each patient check in with home life and recovery efforts and identify triggers, cravings, and high risk situations that arise between group sessions. Therapist provided empathy and support during group session.     Session Content/Coping Skills: Check ins completed by group members. Complacency psychoeducational material reviewed and discussed with group members (retrieved from taking the escalator).     Response: Patient attended class in person. Patient participated in completion of check in form. Patient on check in form answered no to question of “currently or since your last group meeting, have you had any suicidal thoughts or plan or intent to hurt yourself”, and patient also answered no on check in form to question of “currently or since your last group meeting, have you had any homicidal thoughts or plan or intent to hurt others”.     Personal Assessment 0-10 Scale (0-none, 10-high)    Anxiety:  0   Depression:  0   Cravings: 0     Assessment:     ..  Lab on 05/09/2024   Component Date Value Ref Range Status    Reference Lab Report 05/09/2024 See scanned report   Final    THC, Screen, Urine 05/09/2024 Negative  Negative Final    Phencyclidine (PCP), Urine 05/09/2024 Negative  Negative Final    Cocaine Screen, Urine 05/09/2024 Negative  Negative Final    Methamphetamine, Ur 05/09/2024 Negative  Negative Final    Opiate Screen 05/09/2024 Negative  Negative Final    Amphetamine Screen, Urine 05/09/2024 Negative  Negative Final    Benzodiazepine Screen, Urine 05/09/2024 Negative  Negative Final    Tricyclic Antidepressants Screen 05/09/2024 Negative  Negative  Final    Methadone Screen, Urine 05/09/2024 Negative  Negative Final    Barbiturates Screen, Urine 05/09/2024 Negative  Negative Final    Oxycodone Screen, Urine 05/09/2024 Negative  Negative Final    Buprenorphine, Screen, Urine 05/09/2024 Negative  Negative Final   Lab on 05/01/2024   Component Date Value Ref Range Status    THC, Screen, Urine 05/01/2024 Negative  Negative Final    Phencyclidine (PCP), Urine 05/01/2024 Negative  Negative Final    Cocaine Screen, Urine 05/01/2024 Negative  Negative Final    Methamphetamine, Ur 05/01/2024 Negative  Negative Final    Opiate Screen 05/01/2024 Negative  Negative Final    Amphetamine Screen, Urine 05/01/2024 Negative  Negative Final    Benzodiazepine Screen, Urine 05/01/2024 Negative  Negative Final    Tricyclic Antidepressants Screen 05/01/2024 Negative  Negative Final    Methadone Screen, Urine 05/01/2024 Negative  Negative Final    Barbiturates Screen, Urine 05/01/2024 Negative  Negative Final    Oxycodone Screen, Urine 05/01/2024 Negative  Negative Final    Buprenorphine, Screen, Urine 05/01/2024 Negative  Negative Final    Reference Lab Report 05/01/2024 See scanned report   Final   Lab on 04/25/2024   Component Date Value Ref Range Status    Reference Lab Report 04/25/2024 See scanned report   Final    THC, Screen, Urine 04/25/2024 Negative  Negative Final    Phencyclidine (PCP), Urine 04/25/2024 Negative  Negative Final    Cocaine Screen, Urine 04/25/2024 Negative  Negative Final    Methamphetamine, Ur 04/25/2024 Negative  Negative Final    Opiate Screen 04/25/2024 Negative  Negative Final    Amphetamine Screen, Urine 04/25/2024 Negative  Negative Final    Benzodiazepine Screen, Urine 04/25/2024 Negative  Negative Final    Tricyclic Antidepressants Screen 04/25/2024 Negative  Negative Final    Methadone Screen, Urine 04/25/2024 Negative  Negative Final    Barbiturates Screen, Urine 04/25/2024 Negative  Negative Final    Oxycodone Screen, Urine 04/25/2024 Negative   Negative Final    Buprenorphine, Screen, Urine 04/25/2024 Negative  Negative Final       Mental Status Exam  Hygiene:  good  Dress: casual  Attitude: cooperative and agreeable   Motor Activity: appropriate  Eye Contact:  good  Speech: regular rate and rhythm   Mood:  calm and cooperative  Affect:  Appropriate  Thought Processes:  Linear  Thought Content:  Normal  Suicidal Thoughts:  denies  Homicidal Thoughts:  denies  Crisis Safety Plan: Safety plan has been discussed.   Hallucinations:  Unknown to clinician.   Reliability: fair  Insight: fair  Judgement: fair  Impulse Control: fair    Recovery/spiritual support group attendance: No.      Progress toward goal: Not at goal    Prognosis: Fair with Ongoing Treatment     Self-reported number of days sober: Patient on check in form reported [place] 108 days/ Alc 169 days.     Patient will contact this office, call 911 or present to the nearest emergency room should suicidal or homicidal ideations occur.    Impression/Formulation:    ICD-10-CM ICD-9-CM   1. Alcohol use disorder, severe, in early remission  F10.21 303.93       Clinical Maneuvering/Interventions: Therapist utilized a person-centered approach to build rapport with group member. Therapist implemented motivational interviewing techniques to assist client with exploring and resolving ambivalence associated with commitment to change behaviors related to substance use and addiction. Therapist applied cognitive behavioral strategies to facilitate identification of maladaptive patterns of thinking and behavior that contribute to client's risk for continued substance use and relapse. Therapist employed group interaction activities to build rapport among group members, promote sobriety, and emphasize relapse prevention. Therapist promoted safe nonjudgmental environment by providing group members with unconditional positive regard and encouraging group members to comply with group rules and guidelines. Therapist  assisted group member with identifying and implementing healthier coping strategies.      Plan:  Continue [facility] IOP Phase II   Aftercare:  [facility] Phase III  Program Assignments:  Personal recovery plan, relapse prevention plan, attendance of recovery support group meetings, exploration of sponsorship, drug/alcohol screens.     Stan Powell LCSW  5/24/2024  12:53 EDT

## 2024-05-26 DIAGNOSIS — F41.1 GAD (GENERALIZED ANXIETY DISORDER): ICD-10-CM

## 2024-05-28 RX ORDER — FLUOXETINE HYDROCHLORIDE 20 MG/1
20 CAPSULE ORAL DAILY
Qty: 30 CAPSULE | Refills: 0 | OUTPATIENT
Start: 2024-05-28

## 2024-05-28 NOTE — PROGRESS NOTES
CD IOP GROUP     Date: 05/20/2024  Name: Paula Linares    Time In: 1530   Time Out: 1625     Number of participants: 5    IOP GROUP NOTE     Data: 1 hour IOP group therapy session (Check-ins, Coping Skills, Relapse Prevention)     Check Ins: Therapist continued facilitation of rapport building strategies between group members. Therapist asked that each patient check in with home life and recovery efforts and identify triggers, cravings, and high risk situations that arise between group sessions. Therapist provided empathy and support during group session.     Session Content/Coping Skills: Check ins completed by group members. , Kaia, was in attendance and shared Just for [employer] reading. Clinician facilitated discussion focused on the rewards of sobriety experienced by each group member. Clinician introduced and facilitated discussion of a recovery quote (retrieved from [facility] website).     Response: Patient attended class in person. Patient participated in completion of check in form. Patient on check in form answered no to question of “currently or since your last group meeting, have you had any suicidal thoughts or plan or intent to hurt yourself”, and patient also answered no on check in form to question of “currently or since your last group meeting, have you had any homicidal thoughts or plan or intent to hurt others”.     Personal Assessment 0-10 Scale (0-none, 10-high)    Anxiety:  1   Depression:  0   Cravings: 0     Assessment:     ..  Lab on 05/20/2024   Component Date Value Ref Range Status    THC, Screen, Urine 05/20/2024 Negative  Negative Final    Phencyclidine (PCP), Urine 05/20/2024 Negative  Negative Final    Cocaine Screen, Urine 05/20/2024 Negative  Negative Final    Methamphetamine, Ur 05/20/2024 Negative  Negative Final    Opiate Screen 05/20/2024 Negative  Negative Final    Amphetamine Screen, Urine 05/20/2024 Negative  Negative Final     Benzodiazepine Screen, Urine 05/20/2024 Negative  Negative Final    Tricyclic Antidepressants Screen 05/20/2024 Negative  Negative Final    Methadone Screen, Urine 05/20/2024 Negative  Negative Final    Barbiturates Screen, Urine 05/20/2024 Negative  Negative Final    Oxycodone Screen, Urine 05/20/2024 Negative  Negative Final    Buprenorphine, Screen, Urine 05/20/2024 Negative  Negative Final   Lab on 05/16/2024   Component Date Value Ref Range Status    THC, Screen, Urine 05/16/2024 Negative  Negative Final    Phencyclidine (PCP), Urine 05/16/2024 Negative  Negative Final    Cocaine Screen, Urine 05/16/2024 Negative  Negative Final    Methamphetamine, Ur 05/16/2024 Negative  Negative Final    Opiate Screen 05/16/2024 Negative  Negative Final    Amphetamine Screen, Urine 05/16/2024 Negative  Negative Final    Benzodiazepine Screen, Urine 05/16/2024 Negative  Negative Final    Tricyclic Antidepressants Screen 05/16/2024 Negative  Negative Final    Methadone Screen, Urine 05/16/2024 Negative  Negative Final    Barbiturates Screen, Urine 05/16/2024 Negative  Negative Final    Oxycodone Screen, Urine 05/16/2024 Negative  Negative Final    Buprenorphine, Screen, Urine 05/16/2024 Negative  Negative Final   Lab on 05/09/2024   Component Date Value Ref Range Status    Reference Lab Report 05/09/2024 See scanned report   Final    THC, Screen, Urine 05/09/2024 Negative  Negative Final    Phencyclidine (PCP), Urine 05/09/2024 Negative  Negative Final    Cocaine Screen, Urine 05/09/2024 Negative  Negative Final    Methamphetamine, Ur 05/09/2024 Negative  Negative Final    Opiate Screen 05/09/2024 Negative  Negative Final    Amphetamine Screen, Urine 05/09/2024 Negative  Negative Final    Benzodiazepine Screen, Urine 05/09/2024 Negative  Negative Final    Tricyclic Antidepressants Screen 05/09/2024 Negative  Negative Final    Methadone Screen, Urine 05/09/2024 Negative  Negative Final    Barbiturates Screen, Urine 05/09/2024  Negative  Negative Final    Oxycodone Screen, Urine 05/09/2024 Negative  Negative Final    Buprenorphine, Screen, Urine 05/09/2024 Negative  Negative Final   Lab on 05/01/2024   Component Date Value Ref Range Status    THC, Screen, Urine 05/01/2024 Negative  Negative Final    Phencyclidine (PCP), Urine 05/01/2024 Negative  Negative Final    Cocaine Screen, Urine 05/01/2024 Negative  Negative Final    Methamphetamine, Ur 05/01/2024 Negative  Negative Final    Opiate Screen 05/01/2024 Negative  Negative Final    Amphetamine Screen, Urine 05/01/2024 Negative  Negative Final    Benzodiazepine Screen, Urine 05/01/2024 Negative  Negative Final    Tricyclic Antidepressants Screen 05/01/2024 Negative  Negative Final    Methadone Screen, Urine 05/01/2024 Negative  Negative Final    Barbiturates Screen, Urine 05/01/2024 Negative  Negative Final    Oxycodone Screen, Urine 05/01/2024 Negative  Negative Final    Buprenorphine, Screen, Urine 05/01/2024 Negative  Negative Final    Reference Lab Report 05/01/2024 See scanned report   Final       Mental Status Exam  Hygiene:  good  Dress: casual  Attitude: cooperative and agreeable   Motor Activity: appropriate  Eye Contact:  good  Speech: regular rate and rhythm   Mood:  calm and cooperative  Affect:  Appropriate  Thought Processes:  Linear  Thought Content:  Normal  Suicidal Thoughts:  denies  Homicidal Thoughts:  denies  Crisis Safety Plan: Safety plan has been discussed.   Hallucinations:  Unknown to clinician.   Reliability: fair  Insight: fair  Judgement: fair  Impulse Control: fair    Recovery/spiritual support group attendance: No.      Progress toward goal: Not at goal    Prognosis: Fair with Ongoing Treatment     Self-reported number of days sober: Patient on check in form reported [place] 115 days/ [place] 176 days.     Patient will contact this office, call 911 or present to the nearest emergency room should suicidal or homicidal ideations occur.    Impression/Formulation:     ICD-10-CM ICD-9-CM   1. Alcohol use disorder, severe, in early remission  F10.21 303.93       Clinical Maneuvering/Interventions: Therapist utilized a person-centered approach to build rapport with group member. Therapist implemented motivational interviewing techniques to assist client with exploring and resolving ambivalence associated with commitment to change behaviors related to substance use and addiction. Therapist applied cognitive behavioral strategies to facilitate identification of maladaptive patterns of thinking and behavior that contribute to client's risk for continued substance use and relapse. Therapist employed group interaction activities to build rapport among group members, promote sobriety, and emphasize relapse prevention. Therapist promoted safe nonjudgmental environment by providing group members with unconditional positive regard and encouraging group members to comply with group rules and guidelines. Therapist assisted group member with identifying and implementing healthier coping strategies.      Plan:  Continue [facility] IOP Phase II  Aftercare:  [facility] Phase III  Program Assignments:  Personal recovery plan, relapse prevention plan, attendance of recovery support group meetings, exploration of sponsorship, drug/alcohol screens.     Stan Powell LCSW  5/27/2024  22:14 EDT

## 2024-05-28 NOTE — PROGRESS NOTES
CD IOP GROUP     Date: 05/16/2024  Name: Paula Linares    Time In: 1530   Time Out: 1625     Number of participants: 5    IOP GROUP NOTE     Data: 1 hour IOP group therapy session (Check-ins, Coping Skills, Relapse Prevention)     Check Ins: Therapist continued facilitation of rapport building strategies between group members. Therapist asked that each patient check in with home life and recovery efforts and identify triggers, cravings, and high risk situations that arise between group sessions. Therapist provided empathy and support during group session.     Session Content/Coping Skills: Check ins completed by group members. “[employer] M[employer] We [employer]” article reviewed and discussed.     Response: Patient attended class in person. Patient participated in completion of check in form. Patient on check in form answered no to question of “currently or since your last group meeting, have you had any suicidal thoughts or plan or intent to hurt yourself”, and patient also answered no on check in form to question of “currently or since your last group meeting, have you had any homicidal thoughts or plan or intent to hurt others”.     Personal Assessment 0-10 Scale (0-none, 10-high)    Anxiety:  2   Depression:  0   Cravings: 0     Assessment:     ..  Lab on 05/16/2024   Component Date Value Ref Range Status    THC, Screen, Urine 05/16/2024 Negative  Negative Final    Phencyclidine (PCP), Urine 05/16/2024 Negative  Negative Final    Cocaine Screen, Urine 05/16/2024 Negative  Negative Final    Methamphetamine, Ur 05/16/2024 Negative  Negative Final    Opiate Screen 05/16/2024 Negative  Negative Final    Amphetamine Screen, Urine 05/16/2024 Negative  Negative Final    Benzodiazepine Screen, Urine 05/16/2024 Negative  Negative Final    Tricyclic Antidepressants Screen 05/16/2024 Negative  Negative Final    Methadone Screen, Urine 05/16/2024 Negative  Negative Final    Barbiturates Screen, Urine 05/16/2024 Negative  Negative  Final    Oxycodone Screen, Urine 05/16/2024 Negative  Negative Final    Buprenorphine, Screen, Urine 05/16/2024 Negative  Negative Final   Lab on 05/09/2024   Component Date Value Ref Range Status    Reference Lab Report 05/09/2024 See scanned report   Final    THC, Screen, Urine 05/09/2024 Negative  Negative Final    Phencyclidine (PCP), Urine 05/09/2024 Negative  Negative Final    Cocaine Screen, Urine 05/09/2024 Negative  Negative Final    Methamphetamine, Ur 05/09/2024 Negative  Negative Final    Opiate Screen 05/09/2024 Negative  Negative Final    Amphetamine Screen, Urine 05/09/2024 Negative  Negative Final    Benzodiazepine Screen, Urine 05/09/2024 Negative  Negative Final    Tricyclic Antidepressants Screen 05/09/2024 Negative  Negative Final    Methadone Screen, Urine 05/09/2024 Negative  Negative Final    Barbiturates Screen, Urine 05/09/2024 Negative  Negative Final    Oxycodone Screen, Urine 05/09/2024 Negative  Negative Final    Buprenorphine, Screen, Urine 05/09/2024 Negative  Negative Final   Lab on 05/01/2024   Component Date Value Ref Range Status    THC, Screen, Urine 05/01/2024 Negative  Negative Final    Phencyclidine (PCP), Urine 05/01/2024 Negative  Negative Final    Cocaine Screen, Urine 05/01/2024 Negative  Negative Final    Methamphetamine, Ur 05/01/2024 Negative  Negative Final    Opiate Screen 05/01/2024 Negative  Negative Final    Amphetamine Screen, Urine 05/01/2024 Negative  Negative Final    Benzodiazepine Screen, Urine 05/01/2024 Negative  Negative Final    Tricyclic Antidepressants Screen 05/01/2024 Negative  Negative Final    Methadone Screen, Urine 05/01/2024 Negative  Negative Final    Barbiturates Screen, Urine 05/01/2024 Negative  Negative Final    Oxycodone Screen, Urine 05/01/2024 Negative  Negative Final    Buprenorphine, Screen, Urine 05/01/2024 Negative  Negative Final    Reference Lab Report 05/01/2024 See scanned report   Final       Mental Status Exam  Hygiene:   good  Dress: casual  Attitude: cooperative and agreeable   Motor Activity: appropriate  Eye Contact:  good  Speech: regular rate and rhythm   Mood:  calm and cooperative  Affect:  Appropriate  Thought Processes:  Linear  Thought Content:  Normal  Suicidal Thoughts:  denies  Homicidal Thoughts:  denies  Crisis Safety Plan: Safety plan has been discussed.   Hallucinations:  Unknown to clinician.   Reliability: fair  Insight: fair  Judgement: fair  Impulse Control: fair    Recovery/spiritual support group attendance: No.      Progress toward goal: Not at goal    Prognosis: Fair with Ongoing Treatment     Self-reported number of days sober: Patient on check in form reported [place] 111 days/ [place] 172 days.     Patient will contact this office, call 911 or present to the nearest emergency room should suicidal or homicidal ideations occur.    Impression/Formulation:    ICD-10-CM ICD-9-CM   1. Alcohol use disorder, severe, in early remission  F10.21 303.93       Clinical Maneuvering/Interventions: Therapist utilized a person-centered approach to build rapport with group member. Therapist implemented motivational interviewing techniques to assist client with exploring and resolving ambivalence associated with commitment to change behaviors related to substance use and addiction. Therapist applied cognitive behavioral strategies to facilitate identification of maladaptive patterns of thinking and behavior that contribute to client's risk for continued substance use and relapse. Therapist employed group interaction activities to build rapport among group members, promote sobriety, and emphasize relapse prevention. Therapist promoted safe nonjudgmental environment by providing group members with unconditional positive regard and encouraging group members to comply with group rules and guidelines. Therapist assisted group member with identifying and implementing healthier coping strategies.      Plan:  Continue [facility] Phase II  Aftercare:  [facility] Phase III  Program Assignments:  Personal recovery plan, relapse prevention plan, attendance of recovery support group meetings, exploration of sponsorship, drug/alcohol screens.     Stan Powell LCSW  5/27/2024  20:53 EDT

## 2024-05-28 NOTE — PROGRESS NOTES
CD IOP GROUP     Date: 05/23/2024  Name: Paula Linares    Time In: 1530   Time Out: 1625     Number of participants: 8    IOP GROUP NOTE     Data: 1 hour IOP group therapy session (Check-ins, Coping Skills, Relapse Prevention)     Check Ins: Therapist continued facilitation of rapport building strategies between group members. Therapist asked that each patient check in with home life and recovery efforts and identify triggers, cravings, and high risk situations that arise between group sessions. Therapist provided empathy and support during group session.     Session Content/Coping Skills: Check ins completed by group members. Each group member was assigned a “group goal” task which they were to complete during the course of the group (retrieved from taking the escalator). Clinician discussed topic of Core Beliefs with group members. Cognitive Distortion psychoeducational material reviewed with group (retrieved from therapist aid). At the conclusion of the group, group members shared if they were able to complete their goal assigned at the beginning of class.     Response: Patient attended class in person. Patient participated in completion of check in form. Patient on check in form answered no to question of “currently or since your last group meeting, have you had any suicidal thoughts or plan or intent to hurt yourself”, and patient also answered no on check in form to question of “currently or since your last group meeting, have you had any homicidal thoughts or plan or intent to hurt others”.     Personal Assessment 0-10 Scale (0-none, 10-high)    Anxiety:  0   Depression:  0   Cravings: 0     Assessment:     ..  Lab on 05/20/2024   Component Date Value Ref Range Status    THC, Screen, Urine 05/20/2024 Negative  Negative Final    Phencyclidine (PCP), Urine 05/20/2024 Negative  Negative Final    Cocaine Screen, Urine 05/20/2024 Negative  Negative Final    Methamphetamine, Ur 05/20/2024 Negative   Negative Final    Opiate Screen 05/20/2024 Negative  Negative Final    Amphetamine Screen, Urine 05/20/2024 Negative  Negative Final    Benzodiazepine Screen, Urine 05/20/2024 Negative  Negative Final    Tricyclic Antidepressants Screen 05/20/2024 Negative  Negative Final    Methadone Screen, Urine 05/20/2024 Negative  Negative Final    Barbiturates Screen, Urine 05/20/2024 Negative  Negative Final    Oxycodone Screen, Urine 05/20/2024 Negative  Negative Final    Buprenorphine, Screen, Urine 05/20/2024 Negative  Negative Final   Lab on 05/16/2024   Component Date Value Ref Range Status    THC, Screen, Urine 05/16/2024 Negative  Negative Final    Phencyclidine (PCP), Urine 05/16/2024 Negative  Negative Final    Cocaine Screen, Urine 05/16/2024 Negative  Negative Final    Methamphetamine, Ur 05/16/2024 Negative  Negative Final    Opiate Screen 05/16/2024 Negative  Negative Final    Amphetamine Screen, Urine 05/16/2024 Negative  Negative Final    Benzodiazepine Screen, Urine 05/16/2024 Negative  Negative Final    Tricyclic Antidepressants Screen 05/16/2024 Negative  Negative Final    Methadone Screen, Urine 05/16/2024 Negative  Negative Final    Barbiturates Screen, Urine 05/16/2024 Negative  Negative Final    Oxycodone Screen, Urine 05/16/2024 Negative  Negative Final    Buprenorphine, Screen, Urine 05/16/2024 Negative  Negative Final   Lab on 05/09/2024   Component Date Value Ref Range Status    Reference Lab Report 05/09/2024 See scanned report   Final    THC, Screen, Urine 05/09/2024 Negative  Negative Final    Phencyclidine (PCP), Urine 05/09/2024 Negative  Negative Final    Cocaine Screen, Urine 05/09/2024 Negative  Negative Final    Methamphetamine, Ur 05/09/2024 Negative  Negative Final    Opiate Screen 05/09/2024 Negative  Negative Final    Amphetamine Screen, Urine 05/09/2024 Negative  Negative Final    Benzodiazepine Screen, Urine 05/09/2024 Negative  Negative Final    Tricyclic Antidepressants Screen  05/09/2024 Negative  Negative Final    Methadone Screen, Urine 05/09/2024 Negative  Negative Final    Barbiturates Screen, Urine 05/09/2024 Negative  Negative Final    Oxycodone Screen, Urine 05/09/2024 Negative  Negative Final    Buprenorphine, Screen, Urine 05/09/2024 Negative  Negative Final       Mental Status Exam  Hygiene:  good  Dress: casual  Attitude: cooperative and agreeable   Motor Activity: appropriate  Eye Contact:  good  Speech: regular rate and rhythm   Mood:  calm and cooperative  Affect:  Appropriate  Thought Processes:  Linear  Thought Content:  Normal  Suicidal Thoughts:  denies  Homicidal Thoughts:  denies  Crisis Safety Plan: Safety plan has been discussed.   Hallucinations:  Unknown to clinician.   Reliability: fair  Insight: fair  Judgement: fair  Impulse Control: fair    Recovery/spiritual support group attendance: No.      Progress toward goal: Not at goal    Prognosis: Fair with Ongoing Treatment     Self-reported number of days sober: Patient on check in form reported [place] 118 days/ [place] 179 days.     Patient will contact this office, call 911 or present to the nearest emergency room should suicidal or homicidal ideations occur.    Impression/Formulation:    ICD-10-CM ICD-9-CM   1. Alcohol use disorder, severe, in early remission  F10.21 303.93       Clinical Maneuvering/Interventions: Therapist utilized a person-centered approach to build rapport with group member. Therapist implemented motivational interviewing techniques to assist client with exploring and resolving ambivalence associated with commitment to change behaviors related to substance use and addiction. Therapist applied cognitive behavioral strategies to facilitate identification of maladaptive patterns of thinking and behavior that contribute to client's risk for continued substance use and relapse. Therapist employed group interaction activities to build rapport among group members, promote sobriety, and emphasize relapse  prevention. Therapist promoted safe nonjudgmental environment by providing group members with unconditional positive regard and encouraging group members to comply with group rules and guidelines. Therapist assisted group member with identifying and implementing healthier coping strategies.      Plan:  Continue [facility] IOP Phase II  Aftercare:  [facility] Phase III  Program Assignments:  Personal recovery plan, relapse prevention plan, attendance of recovery support group meetings, exploration of sponsorship, drug/alcohol screens.     Stan Powell LCSW  5/28/2024  13:09 EDT

## 2024-05-29 ENCOUNTER — LAB (OUTPATIENT)
Dept: LAB | Facility: HOSPITAL | Age: 29
End: 2024-05-29
Payer: COMMERCIAL

## 2024-05-29 DIAGNOSIS — F10.20 ALCOHOL USE DISORDER, SEVERE, DEPENDENCE: ICD-10-CM

## 2024-05-29 LAB
AMPHET+METHAMPHET UR QL: NEGATIVE
AMPHETAMINES UR QL: NEGATIVE
BARBITURATES UR QL SCN: NEGATIVE
BENZODIAZ UR QL SCN: NEGATIVE
BUPRENORPHINE SERPL-MCNC: NEGATIVE NG/ML
CANNABINOIDS SERPL QL: NEGATIVE
COCAINE UR QL: NEGATIVE
METHADONE UR QL SCN: NEGATIVE
OPIATES UR QL: NEGATIVE
OXYCODONE UR QL SCN: NEGATIVE
PCP UR QL SCN: NEGATIVE
REF LAB TEST METHOD: NORMAL
REF LAB TEST METHOD: NORMAL
TRICYCLICS UR QL SCN: NEGATIVE

## 2024-05-29 PROCEDURE — 80306 DRUG TEST PRSMV INSTRMNT: CPT

## 2024-05-30 ENCOUNTER — OFFICE VISIT (OUTPATIENT)
Dept: PSYCHIATRY | Facility: CLINIC | Age: 29
End: 2024-05-30
Payer: COMMERCIAL

## 2024-05-30 DIAGNOSIS — F12.90 CANNABIS USE WITHOUT COMPLICATION: ICD-10-CM

## 2024-05-30 DIAGNOSIS — F41.1 GAD (GENERALIZED ANXIETY DISORDER): ICD-10-CM

## 2024-05-30 DIAGNOSIS — F10.21 ALCOHOL USE DISORDER, SEVERE, IN EARLY REMISSION: Primary | ICD-10-CM

## 2024-05-30 PROCEDURE — 90853 GROUP PSYCHOTHERAPY: CPT | Performed by: COUNSELOR

## 2024-05-30 PROCEDURE — 1160F RVW MEDS BY RX/DR IN RCRD: CPT | Performed by: COUNSELOR

## 2024-05-30 PROCEDURE — 1159F MED LIST DOCD IN RCRD: CPT | Performed by: COUNSELOR

## 2024-05-31 RX ORDER — FLUOXETINE HYDROCHLORIDE 20 MG/1
20 CAPSULE ORAL DAILY
Qty: 30 CAPSULE | Refills: 0 | Status: SHIPPED | OUTPATIENT
Start: 2024-05-31

## 2024-05-31 NOTE — PROGRESS NOTES
"IOP PHASE II / Phase III GROUP NOTE    Name: Paula Linares  Date: May 30, 2024    Time in:?3:30   Time out:?4:30   Participants: 10     Data: 1?hour group therapy session (Check ins, icebreaker, selfcare, and review CD-IOP rules)      Clinical Maneuvering/Interventions:?Therapist utilized a person-centered, Motivation interviewing, and CBT approaches to build rapport, exploring and resolving ambivalence associated with commitment to change behaviors related to substance use with and addiction, group member.?Therapist implemented motivational interviewing techniques to assist client with, facilitate identification of maladaptive patterns of thinking and behavior that contribute to client's risk for continued substance use and relapse.?Therapist employed group interaction activities to build rapport among group members, promote sobriety, and emphasize relapse prevention.?Therapist promoted a safe nonjudgmental environment by providing group members with unconditional positive regard and encouraging group members to comply with group rules and guidelines. Therapist assisted group member with identifying and implementing healthier coping strategies.      Response:?Patient attended class in person. Patient participated in completion of check in form. Patient on check in form answered no to question \"currently or since last group meeting,?have you had any suicidal thoughts or plan or intent to hurt yourself”, and patient also answered no to question of \"currently or since your last group meeting, have you had any homicidal thoughts or plan or intent to hurt others\".  Patient participated fully in group discussion by identifying some situations that she is experiencing at home and how it is affecting her sobriety and urges.  Patient received support from members of the group and how to maintain her sobriety and following the proper channels to receive help.  Patient identified some connections other members of the " group openly sharing experiences that she has had in the past and what she is doing now to maintain herself in recovery.  Patient identified some self-care activities to help her feel more in the here and now and connected.  Patient was understanding of the CDIOP rules when they were reviewed.    On a 1:10 Scale (0-none, 10-high):    Anxiety: 1  Depression: 0  Cravings: 0    Assessment     Diagnoses and all orders for this visit:    1. Alcohol use disorder, severe, in early remission (Primary)    2. Cannabis use without complication             Progress toward goal: At goal    Functional Status: Moderate impairment     Prognosis: Good with Ongoing Treatment     Mental Status Exam:   Hygiene:   good  Cooperation:  Cooperative  Eye Contact:  Good  Psychomotor Behavior:  Appropriate  Affect:  Appropriate  Mood: normal  Speech:  Normal  Thought Process:  Linear  Thought Content:  Mood congruent  Suicidal:  None  Homicidal:  None  Hallucinations:  None  Delusion:  None  Memory:  Intact  Orientation:  Person, Place, Time, and Situation  Reliability:  fair  Insight:  Fair  Judgement:  Fair  Impulse Control:  Fair  Physical/Medical Issues:  No      Assessment:  Engaged in activity/Process and self-disclosed: Yes  Affect:Appropriate   Applies topic to self: Yes  Able to give and receive feedback: Yes    Urinalysis: Negative on prelim result dated 5/29/2024  Labs:   Last Urine Toxicity  More data exists         Latest Ref Rng & Units 5/29/2024 5/20/2024   LAST URINE TOXICITY RESULTS   Amphetamine, Urine Qual Negative Negative  Negative    Barbiturates Screen, Urine Negative Negative  Negative    Benzodiazepine Screen, Urine Negative Negative  Negative    Buprenorphine, Screen, Urine Negative Negative  Negative    Cocaine Screen, Urine Negative Negative  Negative    Methadone Screen , Urine Negative Negative  Negative    Methamphetamine, Ur Negative Negative  Negative         Self-Reported days since last use: 125 days for THC  and 186 days for alcohol    12-Step attendance: 0 meeting    Plan:   Patient will continue in weekly group psychotherapy sessions and individual outpatient psychotherapy sessions every 3-4 weeks and will continue in self-help meetings and seek additional treatment if necessary following completion.    Patient will continue in IOP Phase two and three group.      Safety plan has previously been discussed with patient.     BARRY Rice  [unfilled]  07:34 EDT

## 2024-06-03 ENCOUNTER — OFFICE VISIT (OUTPATIENT)
Dept: PSYCHIATRY | Facility: CLINIC | Age: 29
End: 2024-06-03
Payer: COMMERCIAL

## 2024-06-03 DIAGNOSIS — F12.90 CANNABIS USE WITHOUT COMPLICATION: ICD-10-CM

## 2024-06-03 DIAGNOSIS — F10.21 ALCOHOL USE DISORDER, SEVERE, IN EARLY REMISSION: Primary | ICD-10-CM

## 2024-06-03 LAB — REF LAB TEST METHOD: NORMAL

## 2024-06-03 PROCEDURE — 1160F RVW MEDS BY RX/DR IN RCRD: CPT | Performed by: COUNSELOR

## 2024-06-03 PROCEDURE — 1159F MED LIST DOCD IN RCRD: CPT | Performed by: COUNSELOR

## 2024-06-03 PROCEDURE — 90853 GROUP PSYCHOTHERAPY: CPT | Performed by: COUNSELOR

## 2024-06-04 ENCOUNTER — LAB (OUTPATIENT)
Dept: LAB | Facility: HOSPITAL | Age: 29
End: 2024-06-04
Payer: COMMERCIAL

## 2024-06-04 DIAGNOSIS — F10.20 ALCOHOL USE DISORDER, SEVERE, DEPENDENCE: ICD-10-CM

## 2024-06-04 LAB
AMPHET+METHAMPHET UR QL: NEGATIVE
AMPHETAMINES UR QL: NEGATIVE
BARBITURATES UR QL SCN: NEGATIVE
BENZODIAZ UR QL SCN: POSITIVE
BUPRENORPHINE SERPL-MCNC: NEGATIVE NG/ML
CANNABINOIDS SERPL QL: NEGATIVE
COCAINE UR QL: NEGATIVE
METHADONE UR QL SCN: NEGATIVE
OPIATES UR QL: NEGATIVE
OXYCODONE UR QL SCN: NEGATIVE
PCP UR QL SCN: NEGATIVE
TRICYCLICS UR QL SCN: NEGATIVE

## 2024-06-04 PROCEDURE — 80306 DRUG TEST PRSMV INSTRMNT: CPT

## 2024-06-04 NOTE — PROGRESS NOTES
"[facility] PHASE II / Phase III GROUP NOTE    Name: Paula Linares  Date: Ly 3, 2024    Time in:?3:30   Time out:?4:30   Participants: 9     Data: 1?hour group therapy session (Check ins, and instant gratification vs delayed gratification)      Clinical Maneuvering/Interventions:?Therapist utilized a person-centered, Motivation interviewing, and CBT approaches to build rapport, exploring and resolving ambivalence associated with commitment to change behaviors related to substance use with and addiction, group member.?Therapist implemented motivational interviewing techniques to assist client with, facilitate identification of maladaptive patterns of thinking and behavior that contribute to client's risk for continued substance use and relapse.?Therapist employed group interaction activities to build rapport among group members, promote sobriety, and emphasize relapse prevention.?Therapist promoted a safe nonjudgmental environment by providing group members with unconditional positive regard and encouraging group members to comply with group rules and guidelines. Therapist assisted group member with identifying and implementing healthier coping strategies.       Response:?Patient attended class in person. Patient participated in completion of check in form. Patient on check in form answered no to question \"currently or since last group meeting,?have you had any suicidal thoughts or plan or intent to hurt yourself”, and patient also answered no to question of \"currently or since your last group meeting, have you had any homicidal thoughts or plan or intent to hurt others\".  Patient participated fully in group discussion by identifying how she has struggled with instant gratification in the past with addiction and how she is participating daily in delay gratification to assist her in feeling more accomplished in her life and reaching her goals.    On a 1:10 Scale (0-none, 10-high):    Anxiety: 0  Depression: " 0  Cravings: 0    Assessment     Diagnoses and all orders for this visit:    1. Alcohol use disorder, severe, in early remission (Primary)    2. Cannabis use without complication             Progress toward goal: At goal    Functional Status: Moderate impairment     Prognosis: Good with Ongoing Treatment     Mental Status Exam:   Hygiene:   good  Cooperation:  Cooperative  Eye Contact:  Good  Psychomotor Behavior:  Appropriate  Affect:  Appropriate  Mood: normal  Speech:  Normal  Thought Process:  Linear  Thought Content:  Mood congruent  Suicidal:  None  Homicidal:  None  Hallucinations:  None  Delusion:  None  Memory:  Intact  Orientation:  Person, Place, Time, and Situation  Reliability:  fair  Insight:  Fair  Judgement:  Fair  Impulse Control:  Fair  Physical/Medical Issues:  No      Assessment:  Engaged in activity/Process and self-disclosed: Yes  Affect:Appropriate   Applies topic to self: Yes  Able to give and receive feedback: Yes    Urinalysis: Negative on confirmation result dated 5/29/2024  Labs:   Last Urine Toxicity  More data exists         Latest Ref Rng & Units 5/29/2024 5/20/2024   LAST URINE TOXICITY RESULTS   Amphetamine, Urine Qual Negative Negative  Negative    Barbiturates Screen, Urine Negative Negative  Negative    Benzodiazepine Screen, Urine Negative Negative  Negative    Buprenorphine, Screen, Urine Negative Negative  Negative    Cocaine Screen, Urine Negative Negative  Negative    Methadone Screen , Urine Negative Negative  Negative    Methamphetamine, Ur Negative Negative  Negative         Self-Reported days since last use: 129 days for marijuana and 190 from alcohol    12-Step attendance: 0 meetings    Plan:   Patient will continue in weekly group psychotherapy sessions and individual outpatient psychotherapy sessions every 3-4 weeks and will continue in self-help meetings and seek additional treatment if necessary following completion.    Patient will continue in IOP Phase two and  three group.      Safety plan has previously been discussed with patient.     BARRY Rice  [unfilled]  07:33 EDT

## 2024-06-05 ENCOUNTER — OFFICE VISIT (OUTPATIENT)
Dept: PSYCHIATRY | Facility: CLINIC | Age: 29
End: 2024-06-05
Payer: COMMERCIAL

## 2024-06-05 VITALS
HEART RATE: 82 BPM | DIASTOLIC BLOOD PRESSURE: 74 MMHG | SYSTOLIC BLOOD PRESSURE: 110 MMHG | OXYGEN SATURATION: 98 % | HEIGHT: 66 IN | BODY MASS INDEX: 46.93 KG/M2 | WEIGHT: 292 LBS

## 2024-06-05 DIAGNOSIS — F33.1 MODERATE EPISODE OF RECURRENT MAJOR DEPRESSIVE DISORDER: ICD-10-CM

## 2024-06-05 DIAGNOSIS — F10.21 ALCOHOL USE DISORDER, SEVERE, IN EARLY REMISSION: Primary | ICD-10-CM

## 2024-06-05 DIAGNOSIS — F41.1 GAD (GENERALIZED ANXIETY DISORDER): ICD-10-CM

## 2024-06-05 RX ORDER — FLUOXETINE HYDROCHLORIDE 20 MG/1
20 CAPSULE ORAL DAILY
Qty: 30 CAPSULE | Refills: 2 | Status: SHIPPED | OUTPATIENT
Start: 2024-06-05

## 2024-06-05 RX ORDER — BUPROPION HYDROCHLORIDE 100 MG/1
100 TABLET, EXTENDED RELEASE ORAL EVERY MORNING
Qty: 30 TABLET | Refills: 0 | Status: SHIPPED | OUTPATIENT
Start: 2024-06-05

## 2024-06-05 NOTE — PROGRESS NOTES
"     Office  Follow Up Visit      Patient Name: Paula Linares  : 1995   MRN: [id number]     Referring Provider: Lexy Gonzales APRN    Chief Complaint: Substance use    History of Present Illness:   Paula Linares is a 29 y.o. female who is here today for follow up related to substance use and mood.  Continues to do well in her recovery and maintains sober date of 2023 from alcohol and  has had no further marijuana intake since 2024.  Denies any cravings or triggering events since last follow-up.  Is now in phase 2 of IOP.  Has not attended any recovery meetings yet but will need to attend 6 prior to the completion of the program.  Fluoxetine increased to 20 mg daily for MDD and MALIK ADD at last visit.  Tolerating dose increase well.  Anxiety and irritability have resolved.  Feels \"really good \"overall and attributes to medication and sobriety.  Is having some decrease in libido with dose increase.  Sleeping well most nights.  Denies any SI/HI, AVH.  No other complaints today.    Triggers: Habit, to relax, stress    Cravings: Denies currently    Relapse Prevention: IOP, individual therapy as needed, psych medication management, recovery meetings    Urine Drug Screen (today's visit) discussed: 2024 positive benzodiazepine on prelim.  Denies use.    UDS Confirmation: 2024 negative for all substances tested    DHARA (PDMP) Reviewed for Current/Active Medications: No active medications    Past Surgical History:  Past Surgical History:   Procedure Laterality Date    ANKLE SURGERY Left     FRACTURE SURGERY         Problem List:  There is no problem list on file for this patient.      Allergy:   No Known Allergies     Current Medications:   Current Outpatient Medications   Medication Sig Dispense Refill    albuterol sulfate  (90 Base) MCG/ACT inhaler Inhale 2 puffs Every 4 (Four) Hours As Needed for Wheezing. 18 g 0    FLUoxetine (PROzac) 20 MG capsule Take 1 " capsule by mouth Daily. Indications: Generalized Anxiety Disorder, Major Depressive Disorder 30 capsule 2    ipratropium-albuterol (DUO-NEB) 0.5-2.5 mg/3 ml nebulizer Nebulize q 4 h prn wheezing / shortness of breath 360 mL 0    loratadine (CLARITIN) 10 MG tablet Take 1 tablet by mouth Daily.      naloxone (NARCAN) 4 MG/0.1ML nasal spray 1 spray into the nostril(s) as directed by provider As Needed for Opioid Reversal or Respiratory Depression. use for oversedation and call 911 1 each 2    nicotine (Nicoderm CQ) 21 MG/24HR patch Place 1 patch on the skin as directed by provider Daily. 30 patch 0    buPROPion SR (Wellbutrin SR) 100 MG 12 hr tablet Take 1 tablet by mouth Every Morning. 30 tablet 0     No current facility-administered medications for this visit.       Past Medical History:  Past Medical History:   Diagnosis Date    Alcohol abuse     Sober 2 months    Alcoholism     Anxiety     Asthma     Depression     Environmental and seasonal allergies        Social History:  Social History     Socioeconomic History    Marital status: Single   Tobacco Use    Smoking status: Never     Passive exposure: Past    Smokeless tobacco: Current     Types: Snuff   Vaping Use    Vaping status: Never Used   Substance and Sexual Activity    Alcohol use: Not Currently    Drug use: Not Currently    Sexual activity: Yes     Partners: Female     Birth control/protection: Same-sex partner       Family History:  Family History   Problem Relation Age of Onset    Drug abuse Mother     Alcohol abuse Father     Alcohol abuse Maternal Grandfather     Anxiety disorder Sister     Depression Sister          Subjective      Review of Systems:   Review of Systems   Constitutional:  Negative for chills, fatigue and fever.   Respiratory:  Negative for shortness of breath.    Cardiovascular:  Negative for chest pain.   Gastrointestinal:  Negative for abdominal pain.   Skin:  Negative for skin lesions.   Neurological:  Negative for seizures and  confusion.   Psychiatric/Behavioral:  Positive for stress. Negative for hallucinations, sleep disturbance, suicidal ideas and depressed mood. The patient is not nervous/anxious.        PHQ-9 Depression Screening  Little interest or pleasure in doing things? 0-->not at all   Feeling down, depressed, or hopeless? 0-->not at all   Trouble falling or staying asleep, or sleeping too much? 0-->not at all   Feeling tired or having little energy? 0-->not at all   Poor appetite or overeating? 0-->not at all   Feeling bad about yourself - or that you are a failure or have let yourself or your family down? 0-->not at all   Trouble concentrating on things, such as reading the newspaper or watching television? 0-->not at all   Moving or speaking so slowly that other people could have noticed? Or the opposite - being so fidgety or restless that you have been moving around a lot more than usual? 0-->not at all   Thoughts that you would be better off dead, or of hurting yourself in some way? 0-->not at all   PHQ-9 Total Score 0   If you checked off any problems, how difficult have these problems made it for you to do your work, take care of things at home, or get along with other people? not difficult at all        MALIK-7 Score:   Feeling nervous, anxious or on edge: Several days  Not being able to stop or control worrying: Not at all  Worrying too much about different things: Several days  Trouble Relaxing: Not at all  Being so restless that it is hard to sit still: Not at all  Feeling afraid as if something awful might happen: Not at all  Becoming easily annoyed or irritable: Not at all  MALIK 7 Total Score: 2  If you checked any problems, how difficult have these problems made it for you to do your work, take care of things at home, or get along with other people: Not difficult at all    Patient History:   The following portions of the patient's history were reviewed and updated as appropriate: allergies, current medications, past  family history, past medical history, past social history, past surgical history and problem list.     Social:  Social History     Socioeconomic History    Marital status: Single   Tobacco Use    Smoking status: Never     Passive exposure: Past    Smokeless tobacco: Current     Types: Snuff   Vaping Use    Vaping status: Never Used   Substance and Sexual Activity    Alcohol use: Not Currently    Drug use: Not Currently    Sexual activity: Yes     Partners: Female     Birth control/protection: Same-sex partner       Medications:     Current Outpatient Medications:     albuterol sulfate  (90 Base) MCG/ACT inhaler, Inhale 2 puffs Every 4 (Four) Hours As Needed for Wheezing., Disp: 18 g, Rfl: 0    FLUoxetine (PROzac) 20 MG capsule, Take 1 capsule by mouth Daily. Indications: Generalized Anxiety Disorder, Major Depressive Disorder, Disp: 30 capsule, Rfl: 2    ipratropium-albuterol (DUO-NEB) 0.5-2.5 mg/3 ml nebulizer, Nebulize q 4 h prn wheezing / shortness of breath, Disp: 360 mL, Rfl: 0    loratadine (CLARITIN) 10 MG tablet, Take 1 tablet by mouth Daily., Disp: , Rfl:     naloxone (NARCAN) 4 MG/0.1ML nasal spray, 1 spray into the nostril(s) as directed by provider As Needed for Opioid Reversal or Respiratory Depression. use for oversedation and call 911, Disp: 1 each, Rfl: 2    nicotine (Nicoderm CQ) 21 MG/24HR patch, Place 1 patch on the skin as directed by provider Daily., Disp: 30 patch, Rfl: 0    buPROPion SR (Wellbutrin SR) 100 MG 12 hr tablet, Take 1 tablet by mouth Every Morning., Disp: 30 tablet, Rfl: 0    Objective     Physical Exam  Vitals reviewed.   Constitutional:       General: She is not in acute distress.     Appearance: She is well-developed. She is not ill-appearing.   Pulmonary:      Effort: No respiratory distress.   Neurological:      Mental Status: She is alert and oriented to person, place, and time.      Gait: Gait normal.   Psychiatric:         Attention and Perception: Attention normal.   "       Mood and Affect: Mood and affect normal.         Speech: Speech normal.         Behavior: Behavior normal. Behavior is cooperative.         Thought Content: Thought content is not paranoid or delusional. Thought content does not include homicidal or suicidal ideation. Thought content does not include homicidal or suicidal plan.         Cognition and Memory: Cognition and memory normal.         Vital Signs:   Vitals:    06/05/24 1429   BP: 110/74   Pulse: 82   SpO2: 98%   Weight: 132 kg (292 lb)   Height: 167.6 cm (66\")     Body mass index is 47.13 kg/m².     Mental Status Exam: Reviewed 6/5/2024  Hygiene:   good  Cooperation:  Cooperative  Eye Contact:  Good  Psychomotor Behavior:  Appropriate  Affect:  Full range  Mood: normal  Speech:  Normal  Thought Process:  Goal directed  Thought Content:  Normal  Suicidal:  None  Homicidal:  None  Hallucinations:  None  Delusion:  None  Memory:  Intact  Orientation:  Person, Place, Time, and Situation  Reliability:  good  Insight:  Good  Judgement:  Fair  Impulse Control:  Fair    Assessment / Plan    -Continue fluoxetine 20 mg daily for MDD and MALIK.  Has switched to taking an a.m. and is tolerating well.  Denies AE.  -Start bupropion  mg every morning for MDD and sexual dysfunction associated with SSRI.  Discusses off-label use.  Discussed AEM medication and when to call/RTC.   -Advisement to take part in and remain active in 12 Step Recovery Meetings, IOP, and/or 1:1 therapy/counseling and to establish/maintain an active relationship with a recovery sponsor as part of long-term recovery care.  Will start recovery meetings soon.  Continue IOP.  -Continued monitoring for illicit substances for patient safety, medication compliance and future care    Assessment & Plan   Problems Addressed this Visit    None  Visit Diagnoses       Alcohol use disorder, severe, in early remission    -  Primary    Moderate episode of recurrent major depressive disorder        " Relevant Medications    FLUoxetine (PROzac) 20 MG capsule    buPROPion SR (Wellbutrin SR) 100 MG 12 hr tablet    MALIK (generalized anxiety disorder)        Relevant Medications    FLUoxetine (PROzac) 20 MG capsule    buPROPion SR (Wellbutrin SR) 100 MG 12 hr tablet          Diagnoses         Codes Comments    Alcohol use disorder, severe, in early remission    -  Primary ICD-10-CM: F10.21  ICD-9-CM: 303.93     Moderate episode of recurrent major depressive disorder     ICD-10-CM: F33.1  ICD-9-CM: 296.32     MALIK (generalized anxiety disorder)     ICD-10-CM: F41.1  ICD-9-CM: 300.02               PLAN:  Safety: No acute safety concerns  Risk Assessment: Risk of self-harm acutely is low. Risk of self-harm chronically is also low, but could be further elevated in the event of treatment noncompliance and/or AODA.      TREATMENT PLAN/GOALS: Continue supportive psychotherapy efforts and medications as indicated. Treatment and medication options discussed during today's visit. Patient acknowledged and verbally consented to continue with current treatment plan and was educated on the importance of compliance with treatment and follow-up appointments.      MEDICATION ISSUES:  DHARA reviewed as expected.  Discussed medication options and treatment plan of prescribed medication as well as the risks, benefits, and side effects including potential falls, possible impaired driving and metabolic adversities among others. Patient is agreeable to call the office with any worsening of symptoms or onset of side effects. Patient is agreeable to call 911 or go to the nearest ER should he/she begin having SI/HI. No medication side effects or related complaints today.     MEDS ORDERED DURING VISIT:  New Medications Ordered This Visit   Medications    FLUoxetine (PROzac) 20 MG capsule     Sig: Take 1 capsule by mouth Daily. Indications: Generalized Anxiety Disorder, Major Depressive Disorder     Dispense:  30 capsule     Refill:  2     buPROPion SR (Wellbutrin SR) 100 MG 12 hr tablet     Sig: Take 1 tablet by mouth Every Morning.     Dispense:  30 tablet     Refill:  0       Return in about 3 months (around 9/5/2024).             This document has been electronically signed by LULU Mack  June 5, 2024 16:27 EDT      Part of this note may be an electronic transcription/translation of spoken language to printed text using the Dragon Dictation System.

## 2024-06-06 ENCOUNTER — OFFICE VISIT (OUTPATIENT)
Dept: PSYCHIATRY | Facility: CLINIC | Age: 29
End: 2024-06-06
Payer: COMMERCIAL

## 2024-06-06 DIAGNOSIS — F10.21 ALCOHOL USE DISORDER, SEVERE, IN EARLY REMISSION: Primary | ICD-10-CM

## 2024-06-06 PROCEDURE — 1159F MED LIST DOCD IN RCRD: CPT | Performed by: COUNSELOR

## 2024-06-06 PROCEDURE — 1160F RVW MEDS BY RX/DR IN RCRD: CPT | Performed by: COUNSELOR

## 2024-06-06 PROCEDURE — 90853 GROUP PSYCHOTHERAPY: CPT | Performed by: COUNSELOR

## 2024-06-07 NOTE — PROGRESS NOTES
"[facility] PHASE II / Phase III GROUP NOTE    Name: Paula Linares  Date: June 6, 2024    Time in:?3:30   Time out:?4:30   Participants: 10     Data: 1?hour group therapy session (Check ins, goal and strength exercise for relapse prevention)      Clinical Maneuvering/Interventions:?Therapist utilized a person-centered, Motivation interviewing, and CBT approaches to build rapport, exploring and resolving ambivalence associated with commitment to change behaviors related to substance use with and addiction, group member.?Therapist implemented motivational interviewing techniques to assist client with, facilitate identification of maladaptive patterns of thinking and behavior that contribute to client's risk for continued substance use and relapse.?Therapist employed group interaction activities to build rapport among group members, promote sobriety, and emphasize relapse prevention.?Therapist promoted a safe nonjudgmental environment by providing group members with unconditional positive regard and encouraging group members to comply with group rules and guidelines. Therapist assisted group member with identifying and implementing healthier coping strategies.       Response:?Patient attended class in person. Patient participated in completion of check in form. Patient on check in form answered no to question \"currently or since last group meeting,?have you had any suicidal thoughts or plan or intent to hurt yourself”, and patient also answered no to question of \"currently or since your last group meeting, have you had any homicidal thoughts or plan or intent to hurt others\". Patient identified a short goal that she would like to improve on and the strengths the utilizes to assist her in maintaining her sobriety    On a 1:10 Scale (0-none, 10-high):    Anxiety: 0  Depression: 0  Cravings: 0    Assessment     Diagnoses and all orders for this visit:    1. Alcohol use disorder, severe, in early remission " (Primary)             Progress toward goal: At goal    Functional Status: Moderate impairment     Prognosis: Good with Ongoing Treatment     Mental Status Exam:   Hygiene:   good  Cooperation:  Cooperative  Eye Contact:  Good  Psychomotor Behavior:  Appropriate  Affect:  Appropriate  Mood: normal  Speech:  Normal  Thought Process:  Linear  Thought Content:  Normal  Suicidal:  None  Homicidal:  None  Hallucinations:  None  Delusion:  None  Memory:  Intact  Orientation:  Person, Place, Time, and Situation  Reliability:  fair  Insight:  Fair  Judgement:  Fair  Impulse Control:  Fair  Physical/Medical Issues:  No      Assessment:  Engaged in activity/Process and self-disclosed: Yes  Affect:Appropriate   Applies topic to self: Yes  Able to give and receive feedback: Yes    Urinalysis: Positive Benzodiazepine on prelim dated 06/04/2024  Labs:   Last Urine Toxicity  More data exists         Latest Ref Rng & Units 6/4/2024 5/29/2024   LAST URINE TOXICITY RESULTS   Amphetamine, Urine Qual Negative Negative  Negative    Barbiturates Screen, Urine Negative Negative  Negative    Benzodiazepine Screen, Urine Negative Positive  Negative    Buprenorphine, Screen, Urine Negative Negative  Negative    Cocaine Screen, Urine Negative Negative  Negative    Methadone Screen , Urine Negative Negative  Negative    Methamphetamine, Ur Negative Negative  Negative         Self-Reported days since last use: 132 days for THC and 193 days for alcohol    12-Step attendance: 0 Meetings    Plan:   Patient will continue in weekly group psychotherapy sessions and individual outpatient psychotherapy sessions every 3-4 weeks and will continue in self-help meetings and seek additional treatment if necessary following completion.    Patient will continue in IOP Phase two and three group.      Safety plan has previously been discussed with patient.     BARRY Rice  [unfilled]  09:12 EDT

## 2024-06-10 ENCOUNTER — OFFICE VISIT (OUTPATIENT)
Dept: PSYCHIATRY | Facility: CLINIC | Age: 29
End: 2024-06-10
Payer: COMMERCIAL

## 2024-06-10 DIAGNOSIS — F12.90 CANNABIS USE WITHOUT COMPLICATION: ICD-10-CM

## 2024-06-10 DIAGNOSIS — F10.21 ALCOHOL USE DISORDER, SEVERE, IN EARLY REMISSION: Primary | ICD-10-CM

## 2024-06-10 PROCEDURE — 1160F RVW MEDS BY RX/DR IN RCRD: CPT | Performed by: COUNSELOR

## 2024-06-10 PROCEDURE — 1159F MED LIST DOCD IN RCRD: CPT | Performed by: COUNSELOR

## 2024-06-10 PROCEDURE — 90853 GROUP PSYCHOTHERAPY: CPT | Performed by: COUNSELOR

## 2024-06-11 LAB — REF LAB TEST METHOD: NORMAL

## 2024-06-11 NOTE — PROGRESS NOTES
"[facility] PHASE II / Phase III GROUP NOTE    Name: Paula Linares  Date: Ly 10, 2024    Time in:?3:30   Time out:?4:30   Participants: 8     Data: 1?hour group therapy session (Check ins, coping skills, and relapse prevention)      Clinical Maneuvering/Interventions:?Therapist utilized a person-centered, Motivation interviewing, and CBT approaches to build rapport, exploring and resolving ambivalence associated with commitment to change behaviors related to substance use with and addiction, group member.?Therapist implemented motivational interviewing techniques to assist client with, facilitate identification of maladaptive patterns of thinking and behavior that contribute to client's risk for continued substance use and relapse.?Therapist employed group interaction activities to build rapport among group members, promote sobriety, and emphasize relapse prevention.?Therapist promoted a safe nonjudgmental environment by providing group members with unconditional positive regard and encouraging group members to comply with group rules and guidelines. Therapist assisted group member with identifying and implementing healthier coping strategies.       Response:?Patient attended class in person. Patient participated in completion of check in form. Patient on check in form answered no to question \"currently or since last group meeting,?have you had any suicidal thoughts or plan or intent to hurt yourself”, and patient also answered no to question of \"currently or since your last group meeting, have you had any homicidal thoughts or plan or intent to hurt others\".     On a 1:10 Scale (0-none, 10-high):    Anxiety: 1  Depression: 0  Cravings: 0    Assessment     Diagnoses and all orders for this visit:    1. Alcohol use disorder, severe, in early remission (Primary)    2. Cannabis use without complication             Progress toward goal: At goal    Functional Status: Moderate impairment     Prognosis: Good with " Ongoing Treatment     Mental Status Exam:   Hygiene:   good  Cooperation:  Cooperative  Eye Contact:  Good  Psychomotor Behavior:  Appropriate  Affect:  Appropriate  Mood: normal  Speech:  Normal  Thought Process:  Linear  Thought Content:  Mood congruent  Suicidal:  None  Homicidal:  None  Hallucinations:  None  Delusion:  None  Memory:  Intact  Orientation:  Person, Place, Time, and Situation  Reliability:  fair  Insight:  Fair  Judgement:  Fair  Impulse Control:  Fair  Physical/Medical Issues:  No      Assessment:  Engaged in activity/Process and self-disclosed: Yes  Affect:Appropriate   Applies topic to self: Yes  Able to give and receive feedback: Yes    Urinalysis: Positive Benzodiazepine on prelim result dated 6/4/2024  Labs:   Last Urine Toxicity  More data exists         Latest Ref Rng & Units 6/4/2024 5/29/2024   LAST URINE TOXICITY RESULTS   Amphetamine, Urine Qual Negative Negative  Negative    Barbiturates Screen, Urine Negative Negative  Negative    Benzodiazepine Screen, Urine Negative Positive  Negative    Buprenorphine, Screen, Urine Negative Negative  Negative    Cocaine Screen, Urine Negative Negative  Negative    Methadone Screen , Urine Negative Negative  Negative    Methamphetamine, Ur Negative Negative  Negative         Self-Reported days since last use: 136 days for THC and 197 days for alcohol    12-Step attendance: 0 meetings    Plan:   Patient will continue in weekly group psychotherapy sessions and individual outpatient psychotherapy sessions every 3-4 weeks and will continue in self-help meetings and seek additional treatment if necessary following completion.    Patient will continue in IOP Phase two and three group.      Safety plan has previously been discussed with patient.     BARRY Rice  [unfilled]  07:37 EDT

## 2024-06-13 ENCOUNTER — LAB (OUTPATIENT)
Dept: LAB | Facility: HOSPITAL | Age: 29
End: 2024-06-13
Payer: COMMERCIAL

## 2024-06-13 ENCOUNTER — OFFICE VISIT (OUTPATIENT)
Dept: PSYCHIATRY | Facility: CLINIC | Age: 29
End: 2024-06-13
Payer: COMMERCIAL

## 2024-06-13 DIAGNOSIS — F10.20 ALCOHOL USE DISORDER, SEVERE, DEPENDENCE: ICD-10-CM

## 2024-06-13 DIAGNOSIS — F12.90 CANNABIS USE WITHOUT COMPLICATION: ICD-10-CM

## 2024-06-13 DIAGNOSIS — F10.21 ALCOHOL USE DISORDER, SEVERE, IN EARLY REMISSION: Primary | ICD-10-CM

## 2024-06-13 PROCEDURE — 90853 GROUP PSYCHOTHERAPY: CPT | Performed by: COUNSELOR

## 2024-06-13 PROCEDURE — 1159F MED LIST DOCD IN RCRD: CPT | Performed by: COUNSELOR

## 2024-06-13 PROCEDURE — 1160F RVW MEDS BY RX/DR IN RCRD: CPT | Performed by: COUNSELOR

## 2024-06-13 PROCEDURE — 80306 DRUG TEST PRSMV INSTRMNT: CPT

## 2024-06-13 NOTE — PROGRESS NOTES
"[facility] PHASE II / Phase III GROUP NOTE    Name: Paula Linares  Date: June 13, 2024    Time in:?3:30   Time out:?4:30   Participants: 9     Data: 1?hour group therapy session (Check ins, communicating in recovery, and relapse prevention)      Clinical Maneuvering/Interventions:?Therapist utilized a person-centered, Motivation interviewing, and CBT approaches to build rapport, exploring and resolving ambivalence associated with commitment to change behaviors related to substance use with and addiction, group member.?Therapist implemented motivational interviewing techniques to assist client with, facilitate identification of maladaptive patterns of thinking and behavior that contribute to client's risk for continued substance use and relapse.?Therapist employed group interaction activities to build rapport among group members, promote sobriety, and emphasize relapse prevention.?Therapist promoted a safe nonjudgmental environment by providing group members with unconditional positive regard and encouraging group members to comply with group rules and guidelines. Therapist assisted group members with identifying and implementing healthier coping strategies.       Response:?Patient attended class in person. Patient participated in completion of check in form. Patient on check in form answered no to question \"currently or since last group meeting,?have you had any suicidal thoughts or plan or intent to hurt yourself”, and patient also answered no to question of \"currently or since your last group meeting, have you had any homicidal thoughts or plan or intent to hurt others\".     On a 1:10 Scale (0-none, 10-high):    Anxiety: 0  Depression: 0  Cravings: 0    Assessment     Diagnoses and all orders for this visit:    1. Alcohol use disorder, severe, in early remission (Primary)    2. Cannabis use without complication             Progress toward goal: At goal    Functional Status: Moderate impairment     Prognosis: " Good with Ongoing Treatment     Mental Status Exam:   Hygiene:   good  Cooperation:  Cooperative  Eye Contact:  Good  Psychomotor Behavior:  Appropriate  Affect:  Appropriate  Mood: normal  Speech:  Normal  Thought Process:  Linear  Thought Content:  Mood congruent  Suicidal:  None  Homicidal:  None  Hallucinations:  None  Delusion:  None  Memory:  Intact  Orientation:  Person, Place, Time, and Situation  Reliability:  fair  Insight:  Fair  Judgement:  Fair  Impulse Control:  Fair  Physical/Medical Issues:  No      Assessment:  Engaged in activity/Process and self-disclosed: Yes  Affect:Appropriate   Applies topic to self: Yes  Able to give and receive feedback: Yes    Urinalysis: Negative  on prelim result dated 06/13/2024  Labs:   Last Urine Toxicity  More data exists         Latest Ref Rng & Units 6/13/2024 6/4/2024   LAST URINE TOXICITY RESULTS   Amphetamine, Urine Qual Negative Negative  Negative    Barbiturates Screen, Urine Negative Negative  Negative    Benzodiazepine Screen, Urine Negative Negative  Positive    Buprenorphine, Screen, Urine Negative Negative  Negative    Cocaine Screen, Urine Negative Negative  Negative    Methadone Screen , Urine Negative Negative  Negative    Methamphetamine, Ur Negative Negative  Negative         Self-Reported days since last use: 139 days for THC and 200 days for alcohol    12-Step attendance: 0 Meeting    Plan:   Patient will continue in weekly group psychotherapy sessions and individual outpatient psychotherapy sessions every 3-4 weeks and will continue in self-help meetings and seek additional treatment if necessary following completion.    Patient will continue in IOP Phase two and three group.      Safety plan has previously been discussed with patient.     BARRY Rice  [unfilled]  18:29 EDT

## 2024-06-17 ENCOUNTER — OFFICE VISIT (OUTPATIENT)
Dept: PSYCHIATRY | Facility: CLINIC | Age: 29
End: 2024-06-17
Payer: COMMERCIAL

## 2024-06-17 ENCOUNTER — LAB (OUTPATIENT)
Dept: LAB | Facility: HOSPITAL | Age: 29
End: 2024-06-17
Payer: COMMERCIAL

## 2024-06-17 DIAGNOSIS — F12.90 CANNABIS USE WITHOUT COMPLICATION: ICD-10-CM

## 2024-06-17 DIAGNOSIS — F10.20 ALCOHOL USE DISORDER, SEVERE, DEPENDENCE: ICD-10-CM

## 2024-06-17 DIAGNOSIS — F10.21 ALCOHOL USE DISORDER, SEVERE, IN EARLY REMISSION: Primary | ICD-10-CM

## 2024-06-17 PROCEDURE — 90853 GROUP PSYCHOTHERAPY: CPT | Performed by: COUNSELOR

## 2024-06-17 PROCEDURE — 1159F MED LIST DOCD IN RCRD: CPT | Performed by: COUNSELOR

## 2024-06-17 PROCEDURE — 80306 DRUG TEST PRSMV INSTRMNT: CPT

## 2024-06-17 PROCEDURE — 1160F RVW MEDS BY RX/DR IN RCRD: CPT | Performed by: COUNSELOR

## 2024-06-18 LAB — REF LAB TEST METHOD: NORMAL

## 2024-06-18 NOTE — PROGRESS NOTES
"[facility] PHASE II / Phase III GROUP NOTE    Name: Paula Linares  Date: June 17, 2024    Time in:?3:30   Time out:?4:30   Participants: 9     Data: 1?hour group therapy session (Check ins, conflict resolution)      Clinical Maneuvering/Interventions:?Therapist utilized a person-centered, Motivation interviewing, and CBT approaches to build rapport, exploring and resolving ambivalence associated with commitment to change behaviors related to substance use with and addiction, group member.?Therapist implemented motivational interviewing techniques to assist client with, facilitate identification of maladaptive patterns of thinking and behavior that contribute to client's risk for continued substance use and relapse.?Therapist employed group interaction activities to build rapport among group members, promote sobriety, and emphasize relapse prevention.?Therapist promoted a safe nonjudgmental environment by providing group members with unconditional positive regard and encouraging group members to comply with group rules and guidelines. Therapist assisted group members with identifying and implementing healthier coping strategies.       Response:?Patient attended class in person. Patient participated in completion of check in form. Patient on check in form answered no to question \"currently or since last group meeting,?have you had any suicidal thoughts or plan or intent to hurt yourself”, and patient also answered no to question of \"currently or since your last group meeting, have you had any homicidal thoughts or plan or intent to hurt others\".     On a 1:10 Scale (0-none, 10-high):    Anxiety: 0  Depression: 0  Cravings: 0    Assessment     Diagnoses and all orders for this visit:    1. Alcohol use disorder, severe, in early remission (Primary)    2. Cannabis use without complication             Progress toward goal: At goal    Functional Status: Moderate impairment     Prognosis: Good with Ongoing Treatment "     Mental Status Exam:   Hygiene:   good  Cooperation:  Cooperative  Eye Contact:  Good  Psychomotor Behavior:  Appropriate  Affect:  Appropriate  Mood: normal  Speech:  Normal  Thought Process:  Linear  Thought Content:  Mood congruent  Suicidal:  None  Homicidal:  None  Hallucinations:  None  Delusion:  None  Memory:  Intact  Orientation:  Person, Place, Time, and Situation  Reliability:  fair  Insight:  Fair  Judgement:  Fair  Impulse Control:  Fair  Physical/Medical Issues:  No      Assessment:  Engaged in activity/Process and self-disclosed: Yes  Affect:Appropriate   Applies topic to self: Yes  Able to give and receive feedback: Yes    Urinalysis: Negative on prelim result dated 06/17/2024  Labs:   Last Urine Toxicity  More data exists         Latest Ref Rng & Units 6/17/2024 6/13/2024   LAST URINE TOXICITY RESULTS   Amphetamine, Urine Qual Negative Negative  Negative    Barbiturates Screen, Urine Negative Negative  Negative    Benzodiazepine Screen, Urine Negative Negative  Negative    Buprenorphine, Screen, Urine Negative Negative  Negative    Cocaine Screen, Urine Negative Negative  Negative    Methadone Screen , Urine Negative Negative  Negative    Methamphetamine, Ur Negative Negative  Negative         Self-Reported days since last use: 143 days for marijuana and 204 days for alcohol    12-Step attendance: 0 meetings    Plan:   Patient will continue in weekly group psychotherapy sessions and individual outpatient psychotherapy sessions every 3-4 weeks and will continue in self-help meetings and seek additional treatment if necessary following completion.    Patient will continue in IOP Phase two and three group.      Safety plan has previously been discussed with patient.     BARRY Rice  [unfilled]  07:43 EDT

## 2024-06-20 ENCOUNTER — OFFICE VISIT (OUTPATIENT)
Dept: PSYCHIATRY | Facility: CLINIC | Age: 29
End: 2024-06-20
Payer: COMMERCIAL

## 2024-06-20 DIAGNOSIS — F10.21 ALCOHOL USE DISORDER, SEVERE, IN EARLY REMISSION: Primary | ICD-10-CM

## 2024-06-20 DIAGNOSIS — F12.90 CANNABIS USE WITHOUT COMPLICATION: ICD-10-CM

## 2024-06-20 PROCEDURE — 1159F MED LIST DOCD IN RCRD: CPT | Performed by: COUNSELOR

## 2024-06-20 PROCEDURE — 1160F RVW MEDS BY RX/DR IN RCRD: CPT | Performed by: COUNSELOR

## 2024-06-20 PROCEDURE — 90853 GROUP PSYCHOTHERAPY: CPT | Performed by: COUNSELOR

## 2024-06-21 NOTE — PROGRESS NOTES
"[facility] PHASE II / Phase III GROUP NOTE    Name: Paula Linares  Date: June 20, 2024    Time in:?3:30   Time out:?4:30   Participants: 10     Data: 1?hour group therapy session (Check ins, relapse prevention activity)      Clinical Maneuvering/Interventions:?Therapist utilized a person-centered, Motivation interviewing, and CBT approaches to build rapport, exploring and resolving ambivalence associated with commitment to change behaviors related to substance use with and addiction, group member.?Therapist implemented motivational interviewing techniques to assist client with, facilitate identification of maladaptive patterns of thinking and behavior that contribute to client's risk for continued substance use and relapse.?Therapist employed group interaction activities to build rapport among group members, promote sobriety, and emphasize relapse prevention.?Therapist promoted a safe nonjudgmental environment by providing group members with unconditional positive regard and encouraging group members to comply with group rules and guidelines. Therapist assisted group members with identifying and implementing healthier coping strategies.       Response:?Patient attended class in person. Patient participated in completion of check in form. Patient on check in form answered no to question \"currently or since last group meeting,?have you had any suicidal thoughts or plan or intent to hurt yourself”, and patient also answered no to question of \"currently or since your last group meeting, have you had any homicidal thoughts or plan or intent to hurt others\".  Patient participated in group discussion by identifying her triggers to relapse and how it can cause her struggle in recovery.  Patient defied some coping skills to help her through the cravings and hardships.    On a 1:10 Scale (0-none, 10-high):    Anxiety: 0  Depression: 0  Cravings: 0    Assessment     Diagnoses and all orders for this visit:    1. Alcohol use " disorder, severe, in early remission (Primary)    2. Cannabis use without complication             Progress toward goal: At goal    Functional Status: Moderate impairment     Prognosis: Good with Ongoing Treatment     Mental Status Exam:   Hygiene:   good  Cooperation:  Cooperative  Eye Contact:  Good  Psychomotor Behavior:  Appropriate  Affect:  Appropriate  Mood: normal  Speech:  Normal  Thought Process:  Linear  Thought Content:  Mood congruent  Suicidal:  None  Homicidal:  None  Hallucinations:  None  Delusion:  None  Memory:  Intact  Orientation:  Person, Place, Time, and Situation  Reliability:  fair  Insight:  Fair  Judgement:  Fair  Impulse Control:  Fair  Physical/Medical Issues:  No      Assessment:  Engaged in activity/Process and self-disclosed: Yes  Affect:Appropriate   Applies topic to self: Yes  Able to give and receive feedback: Yes    Urinalysis: Negative on prelim result dated 6/17/2024  Labs:   Last Urine Toxicity  More data exists         Latest Ref Rng & Units 6/17/2024 6/13/2024   LAST URINE TOXICITY RESULTS   Amphetamine, Urine Qual Negative Negative  Negative    Barbiturates Screen, Urine Negative Negative  Negative    Benzodiazepine Screen, Urine Negative Negative  Negative    Buprenorphine, Screen, Urine Negative Negative  Negative    Cocaine Screen, Urine Negative Negative  Negative    Methadone Screen , Urine Negative Negative  Negative    Methamphetamine, Ur Negative Negative  Negative         Self-Reported days since last use: 146 days from marijuana and 207 days for alcohol    12-Step attendance: 0 meetings    Plan:   Patient will continue in weekly group psychotherapy sessions and individual outpatient psychotherapy sessions every 3-4 weeks and will continue in self-help meetings and seek additional treatment if necessary following completion.    Patient will continue in IOP Phase two and three group.      Safety plan has previously been discussed with patient.     Aime Irizarry,  BARRY  [unfilled]  08:56 EDT

## 2024-06-24 ENCOUNTER — OFFICE VISIT (OUTPATIENT)
Dept: PSYCHIATRY | Facility: CLINIC | Age: 29
End: 2024-06-24
Payer: COMMERCIAL

## 2024-06-24 DIAGNOSIS — F10.21 ALCOHOL USE DISORDER, SEVERE, IN EARLY REMISSION: Primary | ICD-10-CM

## 2024-06-24 DIAGNOSIS — F12.90 CANNABIS USE WITHOUT COMPLICATION: ICD-10-CM

## 2024-06-24 LAB — REF LAB TEST METHOD: NORMAL

## 2024-06-24 PROCEDURE — 90853 GROUP PSYCHOTHERAPY: CPT | Performed by: COUNSELOR

## 2024-06-26 NOTE — PROGRESS NOTES
"[facility] PHASE II / Phase III GROUP NOTE    Name: Paula Linares  Date: June 24, 2024    Time in:?3:30   Time out:?4:30   Participants: 8     Data: 1?hour group therapy session (Check ins and anger management activity)      Clinical Maneuvering/Interventions:?Therapist utilized a person-centered, Motivation interviewing, and CBT approaches to build rapport, exploring and resolving ambivalence associated with commitment to change behaviors related to substance use with and addiction, group member.?Therapist implemented motivational interviewing techniques to assist client with, facilitate identification of maladaptive patterns of thinking and behavior that contribute to client's risk for continued substance use and relapse.?Therapist employed group interaction activities to build rapport among group members, promote sobriety, and emphasize relapse prevention.?Therapist promoted a safe nonjudgmental environment by providing group members with unconditional positive regard and encouraging group members to comply with group rules and guidelines. Therapist assisted group members with identifying and implementing healthier coping strategies.       Response:?Patient attended class in person. Patient participated in completion of check in form. Patient on check in form answered no to question \"currently or since last group meeting,?have you had any suicidal thoughts or plan or intent to hurt yourself”, and patient also answered no to question of \"currently or since your last group meeting, have you had any homicidal thoughts or plan or intent to hurt others\". Patient openly discussed anger triggers and coping skills following those triggers. Patient provided resources and gave feedback to other group members.     On a 1:10 Scale (0-none, 10-high):    Anxiety: 0  Depression: 0  Cravings: 0    Assessment     Diagnoses and all orders for this visit:    1. Alcohol use disorder, severe, in early remission (Primary)    2. " Cannabis use without complication             Progress toward goal: At goal    Functional Status: Moderate impairment     Prognosis: Good with Ongoing Treatment     Mental Status Exam:   Hygiene:   good  Cooperation:  Cooperative  Eye Contact:  Good  Psychomotor Behavior:  Appropriate  Affect:  Appropriate  Mood: normal  Speech:  Normal  Thought Process:  Goal directed  Thought Content:  Normal  Suicidal:  None  Homicidal:  None  Hallucinations:  None  Delusion:  None  Memory:  Intact  Orientation:  Person, Place, Time, and Situation  Reliability:  good  Insight:  Fair  Judgement:  Fair  Impulse Control:  Fair  Physical/Medical Issues:  No      Assessment:  Engaged in activity/Process and self-disclosed: Yes  Affect:Appropriate   Applies topic to self: Yes  Able to give and receive feedback: Yes    Urinalysis: Negative  On confirmation results reported on 06/17/2024  Labs:   Last Urine Toxicity  More data exists         Latest Ref Rng & Units 6/17/2024 6/13/2024   LAST URINE TOXICITY RESULTS   Amphetamine, Urine Qual Negative Negative  Negative    Barbiturates Screen, Urine Negative Negative  Negative    Benzodiazepine Screen, Urine Negative Negative  Negative    Buprenorphine, Screen, Urine Negative Negative  Negative    Cocaine Screen, Urine Negative Negative  Negative    Methadone Screen , Urine Negative Negative  Negative    Methamphetamine, Ur Negative Negative  Negative         Self-Reported days since last use: 150 days on THC and 211 days with alcohol     12-Step attendance: 0 meetings     Plan:   Patient will continue in weekly group psychotherapy sessions and individual outpatient psychotherapy sessions every 3-4 weeks and will continue in self-help meetings and seek additional treatment if necessary following completion.    Patient will continue in IOP Phase two and three group.      Safety plan has previously been discussed with patient.     BARRY Rice  [unfilled]  09:19 EDT

## 2024-07-01 ENCOUNTER — OFFICE VISIT (OUTPATIENT)
Dept: PSYCHIATRY | Facility: CLINIC | Age: 29
End: 2024-07-01
Payer: COMMERCIAL

## 2024-07-01 DIAGNOSIS — F10.21 ALCOHOL USE DISORDER, SEVERE, IN EARLY REMISSION: Primary | ICD-10-CM

## 2024-07-01 DIAGNOSIS — F12.90 CANNABIS USE WITHOUT COMPLICATION: ICD-10-CM

## 2024-07-01 PROCEDURE — 90853 GROUP PSYCHOTHERAPY: CPT | Performed by: COUNSELOR

## 2024-07-01 PROCEDURE — 1160F RVW MEDS BY RX/DR IN RCRD: CPT | Performed by: COUNSELOR

## 2024-07-01 PROCEDURE — 1159F MED LIST DOCD IN RCRD: CPT | Performed by: COUNSELOR

## 2024-07-02 ENCOUNTER — LAB (OUTPATIENT)
Dept: LAB | Facility: HOSPITAL | Age: 29
End: 2024-07-02
Payer: COMMERCIAL

## 2024-07-02 DIAGNOSIS — F10.20 ALCOHOL USE DISORDER, SEVERE, DEPENDENCE: ICD-10-CM

## 2024-07-02 DIAGNOSIS — F33.1 MODERATE EPISODE OF RECURRENT MAJOR DEPRESSIVE DISORDER: ICD-10-CM

## 2024-07-02 PROCEDURE — 80306 DRUG TEST PRSMV INSTRMNT: CPT

## 2024-07-02 RX ORDER — BUPROPION HYDROCHLORIDE 100 MG/1
100 TABLET, EXTENDED RELEASE ORAL EVERY MORNING
Qty: 30 TABLET | Refills: 0 | Status: SHIPPED | OUTPATIENT
Start: 2024-07-02

## 2024-07-02 NOTE — PROGRESS NOTES
"[facility] PHASE II / Phase III GROUP NOTE    Name: Paula Linares  Date: July 1, 2024    Time in:?3:30   Time out:?4:30   Participants: 8     Data: 1?hour group therapy session (Check ins and interviewing you past and future, and coping skills)      Clinical Maneuvering/Interventions:?Therapist utilized a person-centered, Motivation interviewing, and CBT approaches to build rapport, exploring and resolving ambivalence associated with commitment to change behaviors related to substance use with and addiction, group member.?Therapist implemented motivational interviewing techniques to assist client with, facilitate identification of maladaptive patterns of thinking and behavior that contribute to client's risk for continued substance use and relapse.?Therapist employed group interaction activities to build rapport among group members, promote sobriety, and emphasize relapse prevention.?Therapist promoted a safe nonjudgmental environment by providing group members with unconditional positive regard and encouraging group members to comply with group rules and guidelines. Therapist assisted group members with identifying and implementing healthier coping strategies.       Response:?Patient attended class in person. Patient participated in completion of check in form. Patient on check in form answered no to question \"currently or since last group meeting,?have you had any suicidal thoughts or plan or intent to hurt yourself”, and patient also answered no to question of \"currently or since your last group meeting, have you had any homicidal thoughts or plan or intent to hurt others\".  Patient openly shared thoughts of her future and how she thinks she would like to live life.  Patient identified how her past and future is important to who she is today and the steps she is taking to reach her goals.  Patient identified some coping skills to help her when she struggling with big emotions.    On a 1:10 Scale (0-none, " 10-high):    Anxiety: 0  Depression: 0  Cravings: 0    Assessment     Diagnoses and all orders for this visit:    1. Alcohol use disorder, severe, in early remission (Primary)    2. Cannabis use without complication             Progress toward goal: At goal    Functional Status: Moderate impairment     Prognosis: Good with Ongoing Treatment     Mental Status Exam:   Hygiene:   good  Cooperation:  Cooperative  Eye Contact:  Good  Psychomotor Behavior:  Appropriate  Affect:  Appropriate  Mood: normal  Speech:  Normal  Thought Process:  Linear  Thought Content:  Mood congruent  Suicidal:  None  Homicidal:  None  Hallucinations:  None  Delusion:  None  Memory:  Intact  Orientation:  Person, Place, Time, and Situation  Reliability:  fair  Insight:  Fair  Judgement:  Fair  Impulse Control:  Fair  Physical/Medical Issues:  No      Assessment:  Engaged in activity/Process and self-disclosed: Yes  Affect:Appropriate   Applies topic to self: Yes  Able to give and receive feedback: Yes    Urinalysis: Negative on confirmation result dated 6/17/2024  Labs:   Last Urine Toxicity  More data exists         Latest Ref Rng & Units 6/17/2024 6/13/2024   LAST URINE TOXICITY RESULTS   Amphetamine, Urine Qual Negative Negative  Negative    Barbiturates Screen, Urine Negative Negative  Negative    Benzodiazepine Screen, Urine Negative Negative  Negative    Buprenorphine, Screen, Urine Negative Negative  Negative    Cocaine Screen, Urine Negative Negative  Negative    Methadone Screen , Urine Negative Negative  Negative    Methamphetamine, Ur Negative Negative  Negative         Self-Reported days since last use: 157 days from marijuana and 218 days for alcohol    12-Step attendance: 0 meetings    Plan:   Patient will continue in weekly group psychotherapy sessions and individual outpatient psychotherapy sessions every 3-4 weeks and will continue in self-help meetings and seek additional treatment if necessary following  completion.    Patient will continue in IOP Phase two and three group.      Safety plan has previously been discussed with patient.     BARRY Rice  [unfilled]  10:21 EDT

## 2024-07-08 ENCOUNTER — LAB (OUTPATIENT)
Dept: LAB | Facility: HOSPITAL | Age: 29
End: 2024-07-08
Payer: COMMERCIAL

## 2024-07-08 ENCOUNTER — OFFICE VISIT (OUTPATIENT)
Dept: PSYCHIATRY | Facility: CLINIC | Age: 29
End: 2024-07-08
Payer: COMMERCIAL

## 2024-07-08 DIAGNOSIS — F10.21 ALCOHOL USE DISORDER, SEVERE, IN EARLY REMISSION: Primary | ICD-10-CM

## 2024-07-08 DIAGNOSIS — F12.90 CANNABIS USE WITHOUT COMPLICATION: ICD-10-CM

## 2024-07-08 DIAGNOSIS — F10.20 ALCOHOL USE DISORDER, SEVERE, DEPENDENCE: ICD-10-CM

## 2024-07-08 PROCEDURE — 90853 GROUP PSYCHOTHERAPY: CPT | Performed by: COUNSELOR

## 2024-07-08 PROCEDURE — 80306 DRUG TEST PRSMV INSTRMNT: CPT

## 2024-07-08 PROCEDURE — 1160F RVW MEDS BY RX/DR IN RCRD: CPT | Performed by: COUNSELOR

## 2024-07-08 PROCEDURE — 1159F MED LIST DOCD IN RCRD: CPT | Performed by: COUNSELOR

## 2024-07-11 ENCOUNTER — OFFICE VISIT (OUTPATIENT)
Dept: PSYCHIATRY | Facility: CLINIC | Age: 29
End: 2024-07-11
Payer: COMMERCIAL

## 2024-07-11 DIAGNOSIS — F12.90 CANNABIS USE WITHOUT COMPLICATION: ICD-10-CM

## 2024-07-11 DIAGNOSIS — F10.21 ALCOHOL USE DISORDER, SEVERE, IN EARLY REMISSION: Primary | ICD-10-CM

## 2024-07-11 PROCEDURE — 90853 GROUP PSYCHOTHERAPY: CPT | Performed by: COUNSELOR

## 2024-07-11 PROCEDURE — 1159F MED LIST DOCD IN RCRD: CPT | Performed by: COUNSELOR

## 2024-07-11 PROCEDURE — 1160F RVW MEDS BY RX/DR IN RCRD: CPT | Performed by: COUNSELOR

## 2024-07-11 NOTE — PROGRESS NOTES
"[facility] PHASE II / Phase III GROUP NOTE    Name: Paula Linares  Date: July 11, 2024    Time in:?3:30   Time out:?4:30   Participants: 7     Data: 1?hour group therapy session (Check ins, anxiety activity, and coping skills)      Clinical Maneuvering/Interventions:?Therapist utilized a person-centered, Motivation interviewing, and CBT approaches to build rapport, exploring and resolving ambivalence associated with commitment to change behaviors related to substance use with and addiction, group member.?Therapist implemented motivational interviewing techniques to assist client with, facilitate identification of maladaptive patterns of thinking and behavior that contribute to client's risk for continued substance use and relapse.?Therapist employed group interaction activities to build rapport among group members, promote sobriety, and emphasize relapse prevention.?Therapist promoted a safe nonjudgmental environment by providing group members with unconditional positive regard and encouraging group members to comply with group rules and guidelines. Therapist assisted group members with identifying and implementing healthier coping strategies.       Response:?Patient attended class in person. Patient participated in completion of check in form. Patient on check in form answered no to question \"currently or since last group meeting,?have you had any suicidal thoughts or plan or intent to hurt yourself”, and patient also answered no to question of \"currently or since your last group meeting, have you had any homicidal thoughts or plan or intent to hurt others\". Patient openly expressed anxiety triggers and coping skills that she is practicing.    On a 1:10 Scale (0-none, 10-high):    Anxiety: 0  Depression: 0  Cravings: 0    Assessment     Diagnoses and all orders for this visit:    1. Alcohol use disorder, severe, in early remission (Primary)    2. Cannabis use without complication             Progress toward goal: " At goal    Functional Status: Moderate impairment     Prognosis: Good with Ongoing Treatment     Mental Status Exam:   Hygiene:   good  Cooperation:  Cooperative  Eye Contact:  Good  Psychomotor Behavior:  Appropriate  Affect:  Appropriate  Mood: normal  Speech:  Normal  Thought Process:  Linear  Thought Content:  Mood congruent  Suicidal:  None  Homicidal:  None  Hallucinations:  None  Delusion:  None  Memory:  Intact  Orientation:  Person, Place, Time, and Situation  Reliability:  fair  Insight:  Fair  Judgement:  Fair  Impulse Control:  Fair  Physical/Medical Issues:  No      Assessment:  Engaged in activity/Process and self-disclosed: Yes  Affect:Appropriate   Applies topic to self: Yes  Able to give and receive feedback: Yes    Urinalysis: Negative  on prelim result dated 07/08/2024  Labs:   Last Urine Toxicity  More data exists         Latest Ref Rng & Units 7/8/2024 7/2/2024   LAST URINE TOXICITY RESULTS   Amphetamine, Urine Qual Negative Negative  Negative    Barbiturates Screen, Urine Negative Negative  Negative    Benzodiazepine Screen, Urine Negative Negative  Negative    Buprenorphine, Screen, Urine Negative Negative  Negative    Cocaine Screen, Urine Negative Negative  Negative    Methadone Screen , Urine Negative Negative  Negative    Methamphetamine, Ur Negative Negative  Negative         Self-Reported days since last use: 167 days of marijuana and 228 days of alcohol.    12-Step attendance: 0 meetings    Plan:   Patient will continue in weekly group psychotherapy sessions and individual outpatient psychotherapy sessions every 3-4 weeks and will continue in self-help meetings and seek additional treatment if necessary following completion.    Patient will continue in IOP Phase two and three group.      Safety plan has previously been discussed with patient.     BARRY Rice  [unfilled]  18:37 EDT

## 2024-07-12 LAB — REF LAB TEST METHOD: NORMAL

## 2024-07-15 ENCOUNTER — OFFICE VISIT (OUTPATIENT)
Dept: PSYCHIATRY | Facility: CLINIC | Age: 29
End: 2024-07-15
Payer: COMMERCIAL

## 2024-07-15 DIAGNOSIS — F10.21 ALCOHOL USE DISORDER, SEVERE, IN EARLY REMISSION: Primary | ICD-10-CM

## 2024-07-15 DIAGNOSIS — F12.90 CANNABIS USE WITHOUT COMPLICATION: ICD-10-CM

## 2024-07-15 PROCEDURE — 90853 GROUP PSYCHOTHERAPY: CPT | Performed by: COUNSELOR

## 2024-07-15 PROCEDURE — 1160F RVW MEDS BY RX/DR IN RCRD: CPT | Performed by: COUNSELOR

## 2024-07-15 PROCEDURE — 1159F MED LIST DOCD IN RCRD: CPT | Performed by: COUNSELOR

## 2024-07-16 NOTE — PROGRESS NOTES
"[facility] PHASE II / Phase III GROUP NOTE    Name: Paula Linares  Date: July 15, 2024    Time in:?3:30   Time out:?4:30   Participants: 9     Data: 1?hour group therapy session (Check ins, and jeopardy review over fair fighting rules and self-sabotage in addiction)      Clinical Maneuvering/Interventions:?Therapist utilized a person-centered, Motivation interviewing, and CBT approaches to build rapport, exploring and resolving ambivalence associated with commitment to change behaviors related to substance use with and addiction, group member.?Therapist implemented motivational interviewing techniques to assist client with, facilitate identification of maladaptive patterns of thinking and behavior that contribute to client's risk for continued substance use and relapse.?Therapist employed group interaction activities to build rapport among group members, promote sobriety, and emphasize relapse prevention.?Therapist promoted a safe nonjudgmental environment by providing group members with unconditional positive regard and encouraging group members to comply with group rules and guidelines. Therapist assisted group members with identifying and implementing healthier coping strategies.       Response:?Patient attended class in person. Patient participated in completion of check in form. Patient on check in form answered no to question \"currently or since last group meeting,?have you had any suicidal thoughts or plan or intent to hurt yourself”, and patient also answered no to question of \"currently or since your last group meeting, have you had any homicidal thoughts or plan or intent to hurt others\".     On a 1:10 Scale (0-none, 10-high):    Anxiety: 0  Depression: 0  Cravings: 0    Assessment     Diagnoses and all orders for this visit:    1. Alcohol use disorder, severe, in early remission (Primary)    2. Cannabis use without complication             Progress toward goal: At goal    Functional Status: Moderate " impairment     Prognosis: Good with Ongoing Treatment     Mental Status Exam:   Hygiene:   good  Cooperation:  Cooperative  Eye Contact:  Good  Psychomotor Behavior:  Appropriate  Affect:  Appropriate  Mood: normal  Speech:  Normal  Thought Process:  Linear  Thought Content:  Mood congruent  Suicidal:  None  Homicidal:  None  Hallucinations:  None  Delusion:  None  Memory:  Intact  Orientation:  Person, Place, Time, and Situation  Reliability:  fair  Insight:  Fair  Judgement:  Fair  Impulse Control:  Fair  Physical/Medical Issues:  No      Assessment:  Engaged in activity/Process and self-disclosed: Yes  Affect:Appropriate   Applies topic to self: Yes  Able to give and receive feedback: Yes    Urinalysis: Negative on confirmation result dated 7/8/2024   Labs:   Last Urine Toxicity  More data exists         Latest Ref Rng & Units 7/8/2024 7/2/2024   LAST URINE TOXICITY RESULTS   Amphetamine, Urine Qual Negative Negative  Negative    Barbiturates Screen, Urine Negative Negative  Negative    Benzodiazepine Screen, Urine Negative Negative  Negative    Buprenorphine, Screen, Urine Negative Negative  Negative    Cocaine Screen, Urine Negative Negative  Negative    Methadone Screen , Urine Negative Negative  Negative    Methamphetamine, Ur Negative Negative  Negative         Self-Reported days since last use: 171 days for marijuana and 232 days for alcohol    12-Step attendance: 0 meetings    Plan:   Patient will continue in weekly group psychotherapy sessions and individual outpatient psychotherapy sessions every 3-4 weeks and will continue in self-help meetings and seek additional treatment if necessary following completion.    Patient will continue in IOP Phase two and three group.      Safety plan has previously been discussed with patient.     BARRY Rice  [unfilled]  09:10 EDT

## 2024-07-18 ENCOUNTER — LAB (OUTPATIENT)
Dept: LAB | Facility: HOSPITAL | Age: 29
End: 2024-07-18
Payer: COMMERCIAL

## 2024-07-18 ENCOUNTER — OFFICE VISIT (OUTPATIENT)
Dept: PSYCHIATRY | Facility: CLINIC | Age: 29
End: 2024-07-18
Payer: COMMERCIAL

## 2024-07-18 DIAGNOSIS — F10.20 ALCOHOL USE DISORDER, SEVERE, DEPENDENCE: ICD-10-CM

## 2024-07-18 DIAGNOSIS — F10.21 ALCOHOL USE DISORDER, SEVERE, IN EARLY REMISSION: Primary | ICD-10-CM

## 2024-07-18 DIAGNOSIS — F12.90 CANNABIS USE WITHOUT COMPLICATION: ICD-10-CM

## 2024-07-18 PROCEDURE — 1159F MED LIST DOCD IN RCRD: CPT | Performed by: COUNSELOR

## 2024-07-18 PROCEDURE — 80306 DRUG TEST PRSMV INSTRMNT: CPT

## 2024-07-18 PROCEDURE — 1160F RVW MEDS BY RX/DR IN RCRD: CPT | Performed by: COUNSELOR

## 2024-07-18 PROCEDURE — 90853 GROUP PSYCHOTHERAPY: CPT | Performed by: COUNSELOR

## 2024-07-18 NOTE — PROGRESS NOTES
"[facility] PHASE II / Phase III GROUP NOTE    Name: Paula Linares  Date: July 18, 2024    Time in:?3:30   Time out:?4:30   Participants: 10     Data: 1?hour group therapy session (Check ins, and thoughts, feelings, and actions triangle activity)      Clinical Maneuvering/Interventions:?Therapist utilized a person-centered, Motivation interviewing, and CBT approaches to build rapport, exploring and resolving ambivalence associated with commitment to change behaviors related to substance use with and addiction, group member.?Therapist implemented motivational interviewing techniques to assist client with, facilitate identification of maladaptive patterns of thinking and behavior that contribute to client's risk for continued substance use and relapse.?Therapist employed group interaction activities to build rapport among group members, promote sobriety, and emphasize relapse prevention.?Therapist promoted a safe nonjudgmental environment by providing group members with unconditional positive regard and encouraging group members to comply with group rules and guidelines. Therapist assisted group members with identifying and implementing healthier coping strategies.       Response:?Patient attended class in person. Patient participated in completion of check in form. Patient on check in form answered no to question \"currently or since last group meeting,?have you had any suicidal thoughts or plan or intent to hurt yourself”, and patient also answered no to question of \"currently or since your last group meeting, have you had any homicidal thoughts or plan or intent to hurt others\".     On a 1:10 Scale (0-none, 10-high):    Anxiety: 0  Depression: 0  Cravings: 0    Assessment     Diagnoses and all orders for this visit:    1. Alcohol use disorder, severe, in early remission (Primary)    2. Cannabis use without complication             Progress toward goal: At goal    Functional Status: Moderate impairment "     Prognosis: Good with Ongoing Treatment     Mental Status Exam:   Hygiene:   good  Cooperation:  Cooperative  Eye Contact:  Good  Psychomotor Behavior:  Appropriate  Affect:  Appropriate  Mood: normal  Speech:  Normal  Thought Process:  Linear  Thought Content:  Mood congruent  Suicidal:  None  Homicidal:  None  Hallucinations:  None  Delusion:  None  Memory:  Intact  Orientation:  Person, Place, Time, and Situation  Reliability:  fair  Insight:  Fair  Judgement:  Fair  Impulse Control:  Fair  Physical/Medical Issues:  No      Assessment:  Engaged in activity/Process and self-disclosed: Yes  Affect:Appropriate   Applies topic to self: Yes  Able to give and receive feedback: Yes    Urinalysis: Negative  on prelim result dated 07/18/2024  Labs:   Last Urine Toxicity  More data exists         Latest Ref Rng & Units 7/18/2024 7/8/2024   LAST URINE TOXICITY RESULTS   Amphetamine, Urine Qual Negative Negative  Negative    Barbiturates Screen, Urine Negative Negative  Negative    Benzodiazepine Screen, Urine Negative Negative  Negative    Buprenorphine, Screen, Urine Negative Negative  Negative    Cocaine Screen, Urine Negative Negative  Negative    Methadone Screen , Urine Negative Negative  Negative    Methamphetamine, Ur Negative Negative  Negative         Self-Reported days since last use: 174 marijuana and 235 days for alcohol    12-Step attendance: 0 Meetings    Plan:   Patient will continue in weekly group psychotherapy sessions and individual outpatient psychotherapy sessions every 3-4 weeks and will continue in self-help meetings and seek additional treatment if necessary following completion.    Patient will continue in IOP Phase two and three group.      Safety plan has previously been discussed with patient.     BARRY Rice  [unfilled]  18:15 EDT

## 2024-07-22 ENCOUNTER — LAB (OUTPATIENT)
Dept: LAB | Facility: HOSPITAL | Age: 29
End: 2024-07-22
Payer: COMMERCIAL

## 2024-07-22 ENCOUNTER — OFFICE VISIT (OUTPATIENT)
Dept: PSYCHIATRY | Facility: CLINIC | Age: 29
End: 2024-07-22
Payer: COMMERCIAL

## 2024-07-22 DIAGNOSIS — F12.90 CANNABIS USE WITHOUT COMPLICATION: ICD-10-CM

## 2024-07-22 DIAGNOSIS — F10.20 ALCOHOL USE DISORDER, SEVERE, DEPENDENCE: ICD-10-CM

## 2024-07-22 DIAGNOSIS — F10.21 ALCOHOL USE DISORDER, SEVERE, IN EARLY REMISSION: Primary | ICD-10-CM

## 2024-07-22 LAB
AMPHET+METHAMPHET UR QL: NEGATIVE
AMPHETAMINES UR QL: NEGATIVE
BARBITURATES UR QL SCN: NEGATIVE
BENZODIAZ UR QL SCN: POSITIVE
BUPRENORPHINE SERPL-MCNC: NEGATIVE NG/ML
CANNABINOIDS SERPL QL: NEGATIVE
COCAINE UR QL: NEGATIVE
METHADONE UR QL SCN: NEGATIVE
OPIATES UR QL: NEGATIVE
OXYCODONE UR QL SCN: NEGATIVE
PCP UR QL SCN: NEGATIVE
REF LAB TEST METHOD: NORMAL
TRICYCLICS UR QL SCN: NEGATIVE

## 2024-07-22 PROCEDURE — 80306 DRUG TEST PRSMV INSTRMNT: CPT

## 2024-07-22 PROCEDURE — 90853 GROUP PSYCHOTHERAPY: CPT | Performed by: COUNSELOR

## 2024-07-22 PROCEDURE — 1160F RVW MEDS BY RX/DR IN RCRD: CPT | Performed by: COUNSELOR

## 2024-07-22 PROCEDURE — 1159F MED LIST DOCD IN RCRD: CPT | Performed by: COUNSELOR

## 2024-07-23 NOTE — PROGRESS NOTES
"[facility] PHASE II / Phase III GROUP NOTE    Name: Paula Linares  Date: 22nd 2024    Time in:?3:30   Time out:?4:30   Participants: 9     Data: 1?hour group therapy session (Check ins, and thoughts, feelings, and actions triangle activity)      Clinical Maneuvering/Interventions:?Therapist utilized a person-centered, Motivation interviewing, and CBT approaches to build rapport, exploring and resolving ambivalence associated with commitment to change behaviors related to substance use with and addiction, group member.?Therapist implemented motivational interviewing techniques to assist client with, facilitate identification of maladaptive patterns of thinking and behavior that contribute to client's risk for continued substance use and relapse.?Therapist employed group interaction activities to build rapport among group members, promote sobriety, and emphasize relapse prevention.?Therapist promoted a safe nonjudgmental environment by providing group members with unconditional positive regard and encouraging group members to comply with group rules and guidelines. Therapist assisted group members with identifying and implementing healthier coping strategies.       Response:?Patient attended class in person. Patient participated in completion of check in form. Patient on check in form answered no to question \"currently or since last group meeting,?have you had any suicidal thoughts or plan or intent to hurt yourself”, and patient also answered no to question of \"currently or since your last group meeting, have you had any homicidal thoughts or plan or intent to hurt others\". Patient openly shared thoughts about the video and what actions steps that can assist with improving skills in recovery and building connection.     On a 1:10 Scale (0-none, 10-high):    Anxiety: 0  Depression: 0  Cravings: 0    Assessment     Diagnoses and all orders for this visit:    1. Alcohol use disorder, severe, in early remission " (Primary)    2. Cannabis use without complication             Progress toward goal: At goal    Functional Status: Moderate impairment     Prognosis: Good with Ongoing Treatment     Mental Status Exam:   Hygiene:   good  Cooperation:  Cooperative  Eye Contact:  Good  Psychomotor Behavior:  Appropriate  Affect:  Appropriate  Mood: normal  Speech:  Normal  Thought Process:  Linear  Thought Content:  Mood congruent  Suicidal:  None  Homicidal:  None  Hallucinations:  None  Delusion:  None  Memory:  Intact  Orientation:  Person, Place, Time, and Situation  Reliability:  fair  Insight:  Fair  Judgement:  Fair  Impulse Control:  Fair  Physical/Medical Issues:  No      Assessment:  Engaged in activity/Process and self-disclosed: Yes  Affect:Appropriate   Applies topic to self: Yes  Able to give and receive feedback: Yes    Urinalysis: Positive benzodiazepine on prelim dated 7/22/2024  Labs:   Last Urine Toxicity  More data exists         Latest Ref Rng & Units 7/22/2024 7/18/2024   LAST URINE TOXICITY RESULTS   Amphetamine, Urine Qual Negative Negative  Negative    Barbiturates Screen, Urine Negative Negative  Negative    Benzodiazepine Screen, Urine Negative Positive  Negative    Buprenorphine, Screen, Urine Negative Negative  Negative    Cocaine Screen, Urine Negative Negative  Negative    Methadone Screen , Urine Negative Negative  Negative    Methamphetamine, Ur Negative Negative  Negative         Self-Reported days since last use: 178 days from marijuana and 239 days from alcohol    12-Step attendance: 0 meetings    Plan:   Patient will continue in weekly group psychotherapy sessions and individual outpatient psychotherapy sessions every 3-4 weeks and will continue in self-help meetings and seek additional treatment if necessary following completion.    Patient will continue in IOP Phase two and three group.      Safety plan has previously been discussed with patient.     BARRY Rice  [unfilled]  07:49 EDT

## 2024-07-25 ENCOUNTER — OFFICE VISIT (OUTPATIENT)
Dept: PSYCHIATRY | Facility: CLINIC | Age: 29
End: 2024-07-25
Payer: COMMERCIAL

## 2024-07-25 DIAGNOSIS — F10.21 ALCOHOL USE DISORDER, SEVERE, IN EARLY REMISSION: Primary | ICD-10-CM

## 2024-07-25 DIAGNOSIS — F12.90 CANNABIS USE WITHOUT COMPLICATION: ICD-10-CM

## 2024-07-25 PROCEDURE — 1160F RVW MEDS BY RX/DR IN RCRD: CPT | Performed by: COUNSELOR

## 2024-07-25 PROCEDURE — 1159F MED LIST DOCD IN RCRD: CPT | Performed by: COUNSELOR

## 2024-07-25 PROCEDURE — 90853 GROUP PSYCHOTHERAPY: CPT | Performed by: COUNSELOR

## 2024-07-28 LAB — REF LAB TEST METHOD: NORMAL

## 2024-07-29 ENCOUNTER — OFFICE VISIT (OUTPATIENT)
Dept: PSYCHIATRY | Facility: CLINIC | Age: 29
End: 2024-07-29
Payer: COMMERCIAL

## 2024-07-29 DIAGNOSIS — F12.90 CANNABIS USE WITHOUT COMPLICATION: ICD-10-CM

## 2024-07-29 DIAGNOSIS — F10.21 ALCOHOL USE DISORDER, SEVERE, IN EARLY REMISSION: Primary | ICD-10-CM

## 2024-07-29 PROCEDURE — 1160F RVW MEDS BY RX/DR IN RCRD: CPT | Performed by: COUNSELOR

## 2024-07-29 PROCEDURE — 1159F MED LIST DOCD IN RCRD: CPT | Performed by: COUNSELOR

## 2024-07-29 PROCEDURE — 90853 GROUP PSYCHOTHERAPY: CPT | Performed by: COUNSELOR

## 2024-07-29 NOTE — PROGRESS NOTES
"[facility] PHASE II / Phase III GROUP NOTE    Name: Paula Linares  Date: July 25, 2024    Time in:?3:30  Time out:?4:30   Participants: 7      Data: 1?hour group therapy session (Check ins, and connections in recovery)      Clinical Maneuvering/Interventions:?Therapist utilized a person-centered, Motivation interviewing, and CBT approaches to build rapport, exploring and resolving ambivalence associated with commitment to change behaviors related to substance use with and addiction, group member.?Therapist implemented motivational interviewing techniques to assist client with, facilitate identification of maladaptive patterns of thinking and behavior that contribute to client's risk for continued substance use and relapse.?Therapist employed group interaction activities to build rapport among group members, promote sobriety, and emphasize relapse prevention.?Therapist promoted a safe nonjudgmental environment by providing group members with unconditional positive regard and encouraging group members to comply with group rules and guidelines. Therapist assisted group members with identifying and implementing healthier coping strategies.       Response:?Patient attended class in person. Patient participated in completion of check in form. Patient on check in form answered no to question \"currently or since last group meeting,?have you had any suicidal thoughts or plan or intent to hurt yourself”, and patient also answered no to question of \"currently or since your last group meeting, have you had any homicidal thoughts or plan or intent to hurt others\". Patient openly shared about the different types of connections in her life. Patient identified actions that she is taking to grow those connections.     On a 1:10 Scale (0-none, 10-high):    Anxiety: 1  Depression: 0  Cravings: 0    Assessment     Diagnoses and all orders for this visit:    1. Alcohol use disorder, severe, in early remission (Primary)    2. Cannabis " use without complication             Progress toward goal: At goal    Functional Status: Moderate impairment     Prognosis: Good with Ongoing Treatment     Mental Status Exam:   Hygiene:   good  Cooperation:  Cooperative  Eye Contact:  Good  Psychomotor Behavior:  Appropriate  Affect:  Appropriate  Mood: normal  Speech:  Normal  Thought Process:  Linear  Thought Content:  Mood congruent  Suicidal:  None  Homicidal:  None  Hallucinations:  None  Delusion:  None  Memory:  Intact  Orientation:  Person, Place, Time, and Situation  Reliability:  fair  Insight:  Fair  Judgement:  Fair  Impulse Control:  Fair  Physical/Medical Issues:  No      Assessment:  Engaged in activity/Process and self-disclosed: Yes  Affect:Appropriate   Applies topic to self: Yes  Able to give and receive feedback: Yes    Urinalysis: Negative  on confirmation result dated 07/22/2024  Labs:   Last Urine Toxicity  More data exists         Latest Ref Rng & Units 7/22/2024 7/18/2024   LAST URINE TOXICITY RESULTS   Amphetamine, Urine Qual Negative Negative  Negative    Barbiturates Screen, Urine Negative Negative  Negative    Benzodiazepine Screen, Urine Negative Positive  Negative    Buprenorphine, Screen, Urine Negative Negative  Negative    Cocaine Screen, Urine Negative Negative  Negative    Methadone Screen , Urine Negative Negative  Negative    Methamphetamine, Ur Negative Negative  Negative       Details                    Self-Reported days since last use: 181 days from THC and 242 days from alcohol    12-Step attendance: 0 meetings    Plan:   Patient will continue in weekly group psychotherapy sessions and individual outpatient psychotherapy sessions every 3-4 weeks and will continue in self-help meetings and seek additional treatment if necessary following completion.    Patient will continue in IOP Phase two and three group.      Safety plan has previously been discussed with patient.     BARRY Rice  [unfilled]  07:27 EDT

## 2024-07-30 NOTE — PROGRESS NOTES
"[facility] PHASE II / Phase III GROUP NOTE    Name: Paula Linares  Date: July 29, 2024    Time in:?3:30   Time out:?4:30   Participants: 8      Data: 1?hour group therapy session (Check ins, and reasons why not to use substance activity)      Clinical Maneuvering/Interventions:?Therapist utilized a person-centered, Motivation interviewing, and CBT approaches to build rapport, exploring and resolving ambivalence associated with commitment to change behaviors related to substance use with and addiction, group member.?Therapist implemented motivational interviewing techniques to assist client with, facilitate identification of maladaptive patterns of thinking and behavior that contribute to client's risk for continued substance use and relapse.?Therapist employed group interaction activities to build rapport among group members, promote sobriety, and emphasize relapse prevention.?Therapist promoted a safe nonjudgmental environment by providing group members with unconditional positive regard and encouraging group members to comply with group rules and guidelines. Therapist assisted group members with identifying and implementing healthier coping strategies.       Response:?Patient attended class in person. Patient participated in completion of check in form. Patient on check in form answered no to question \"currently or since last group meeting,?have you had any suicidal thoughts or plan or intent to hurt yourself”, and patient also answered no to question of \"currently or since your last group meeting, have you had any homicidal thoughts or plan or intent to hurt others\". Patient openly shared about the reason why not to use activity and to increase their coping skills with reminders on why she is choosing sobriety.     On a 1:10 Scale (0-none, 10-high):    Anxiety: 0  Depression: 0  Cravings: 0    Assessment     Diagnoses and all orders for this visit:    1. Alcohol use disorder, severe, in early remission " (Primary)    2. Cannabis use without complication             Progress toward goal: At goal    Functional Status: Moderate impairment     Prognosis: Good with Ongoing Treatment     Mental Status Exam:   Hygiene:   good  Cooperation:  Cooperative  Eye Contact:  Good  Psychomotor Behavior:  Appropriate  Affect:  Appropriate  Mood: normal  Speech:  Normal  Thought Process:  Linear  Thought Content:  Mood congruent  Suicidal:  None  Homicidal:  None  Hallucinations:  None  Delusion:  None  Memory:  Intact  Orientation:  Person, Place, Time, and Situation  Reliability:  fair  Insight:  Fair  Judgement:  Fair  Impulse Control:  Fair  Physical/Medical Issues:  No      Assessment:  Engaged in activity/Process and self-disclosed: Yes  Affect:Appropriate   Applies topic to self: Yes  Able to give and receive feedback: Yes    Urinalysis: Negative  on confirmation result 07/22/2024  Labs:   Last Urine Toxicity  More data exists         Latest Ref Rng & Units 7/22/2024 7/18/2024   LAST URINE TOXICITY RESULTS   Amphetamine, Urine Qual Negative Negative  Negative    Barbiturates Screen, Urine Negative Negative  Negative    Benzodiazepine Screen, Urine Negative Positive  Negative    Buprenorphine, Screen, Urine Negative Negative  Negative    Cocaine Screen, Urine Negative Negative  Negative    Methadone Screen , Urine Negative Negative  Negative    Methamphetamine, Ur Negative Negative  Negative       Details                    Self-Reported days since last use: 185 marijuana and 246 for alcohol    12-Step attendance: 0 meetings    Plan:   Patient will continue in weekly group psychotherapy sessions and individual outpatient psychotherapy sessions every 3-4 weeks and will continue in self-help meetings and seek additional treatment if necessary following completion.    Patient will continue in IOP Phase two and three group.      Safety plan has previously been discussed with patient.     BARRY Rice  [unfilled]  07:37 EDT

## 2024-07-31 LAB
REF LAB TEST METHOD: NORMAL
REF LAB TEST METHOD: NORMAL

## 2024-08-01 ENCOUNTER — OFFICE VISIT (OUTPATIENT)
Dept: PSYCHIATRY | Facility: CLINIC | Age: 29
End: 2024-08-01
Payer: COMMERCIAL

## 2024-08-01 ENCOUNTER — LAB (OUTPATIENT)
Dept: LAB | Facility: HOSPITAL | Age: 29
End: 2024-08-01
Payer: COMMERCIAL

## 2024-08-01 DIAGNOSIS — F10.21 ALCOHOL USE DISORDER, SEVERE, IN EARLY REMISSION: Primary | ICD-10-CM

## 2024-08-01 DIAGNOSIS — F12.90 CANNABIS USE WITHOUT COMPLICATION: ICD-10-CM

## 2024-08-01 DIAGNOSIS — F10.20 ALCOHOL USE DISORDER, SEVERE, DEPENDENCE: ICD-10-CM

## 2024-08-01 PROCEDURE — 1160F RVW MEDS BY RX/DR IN RCRD: CPT | Performed by: COUNSELOR

## 2024-08-01 PROCEDURE — 90853 GROUP PSYCHOTHERAPY: CPT | Performed by: COUNSELOR

## 2024-08-01 PROCEDURE — 1159F MED LIST DOCD IN RCRD: CPT | Performed by: COUNSELOR

## 2024-08-01 PROCEDURE — 80306 DRUG TEST PRSMV INSTRMNT: CPT

## 2024-08-01 NOTE — PROGRESS NOTES
"[facility] PHASE II / Phase III GROUP NOTE    Name: Paula Linares  Date: August 1, 2024    Time in:?3:44   Time out:?4:30   Participants: 8      Data: 1?hour group therapy session (Check ins, and forgiveness, Live, Love activity)      Clinical Maneuvering/Interventions:?Therapist utilized a person-centered, Motivation interviewing, and CBT approaches to build rapport, exploring and resolving ambivalence associated with commitment to change behaviors related to substance use with and addiction, group member.?Therapist implemented motivational interviewing techniques to assist client with, facilitate identification of maladaptive patterns of thinking and behavior that contribute to client's risk for continued substance use and relapse.?Therapist employed group interaction activities to build rapport among group members, promote sobriety, and emphasize relapse prevention.?Therapist promoted a safe nonjudgmental environment by providing group members with unconditional positive regard and encouraging group members to comply with group rules and guidelines. Therapist assisted group members with identifying and implementing healthier coping strategies.       Response:?Patient attended class in person. Patient participated in completion of check in form. Patient on check in form answered no to question \"currently or since last group meeting,?have you had any suicidal thoughts or plan or intent to hurt yourself”, and patient also answered no to question of \"currently or since your last group meeting, have you had any homicidal thoughts or plan or intent to hurt others\".      On a 1:10 Scale (0-none, 10-high):    Anxiety: 0  Depression: 0  Cravings: 0    Assessment     Diagnoses and all orders for this visit:    1. Alcohol use disorder, severe, in early remission (Primary)    2. Cannabis use without complication             Progress toward goal: At goal    Functional Status: Moderate impairment     Prognosis: Good with " Ongoing Treatment     Mental Status Exam:   Hygiene:   good  Cooperation:  Cooperative  Eye Contact:  Good  Psychomotor Behavior:  Appropriate  Affect:  Appropriate  Mood: normal  Speech:  Normal  Thought Process:  Linear  Thought Content:  Mood congruent  Suicidal:  None  Homicidal:  None  Hallucinations:  None  Delusion:  None  Memory:  Intact  Orientation:  Person, Place, Time, and Situation  Reliability:  fair  Insight:  Fair  Judgement:  Fair  Impulse Control:  Fair  Physical/Medical Issues:  No      Assessment:  Engaged in activity/Process and self-disclosed: Yes  Affect:Appropriate   Applies topic to self: Yes  Able to give and receive feedback: Yes    Urinalysis: Negative  on prelim result dated 08/01/2024  Labs:   Last Urine Toxicity  More data exists         Latest Ref Rng & Units 8/1/2024 7/22/2024   LAST URINE TOXICITY RESULTS   Amphetamine, Urine Qual Negative Negative  Negative    Barbiturates Screen, Urine Negative Negative  Negative    Benzodiazepine Screen, Urine Negative Negative  Positive    Buprenorphine, Screen, Urine Negative Negative  Negative    Cocaine Screen, Urine Negative Negative  Negative    Methadone Screen , Urine Negative Negative  Negative    Methamphetamine, Ur Negative Negative  Negative       Details                    Self-Reported days since last use: 188 days from marijuana and 249 days from alcohol    12-Step attendance: 0 meeting    Plan:   Patient will continue in weekly group psychotherapy sessions and individual outpatient psychotherapy sessions every 3-4 weeks and will continue in self-help meetings and seek additional treatment if necessary following completion.    Patient will continue in IOP Phase two and three group.      Safety plan has previously been discussed with patient.     BARRY Rice  [unfilled]  19:34 EDT

## 2024-08-06 LAB — REF LAB TEST METHOD: NORMAL

## 2024-08-08 ENCOUNTER — OFFICE VISIT (OUTPATIENT)
Dept: PSYCHIATRY | Facility: CLINIC | Age: 29
End: 2024-08-08
Payer: COMMERCIAL

## 2024-08-08 ENCOUNTER — LAB (OUTPATIENT)
Dept: LAB | Facility: HOSPITAL | Age: 29
End: 2024-08-08
Payer: COMMERCIAL

## 2024-08-08 DIAGNOSIS — F12.90 CANNABIS USE WITHOUT COMPLICATION: ICD-10-CM

## 2024-08-08 DIAGNOSIS — F10.21 ALCOHOL USE DISORDER, SEVERE, IN EARLY REMISSION: Primary | ICD-10-CM

## 2024-08-08 DIAGNOSIS — F10.20 ALCOHOL USE DISORDER, SEVERE, DEPENDENCE: ICD-10-CM

## 2024-08-08 PROCEDURE — 1159F MED LIST DOCD IN RCRD: CPT | Performed by: COUNSELOR

## 2024-08-08 PROCEDURE — 1160F RVW MEDS BY RX/DR IN RCRD: CPT | Performed by: COUNSELOR

## 2024-08-08 PROCEDURE — 80306 DRUG TEST PRSMV INSTRMNT: CPT

## 2024-08-08 PROCEDURE — 90853 GROUP PSYCHOTHERAPY: CPT | Performed by: COUNSELOR

## 2024-08-09 NOTE — PROGRESS NOTES
"IOP PHASE II / Phase III GROUP NOTE    Name: Paula Linares  Date: August 8, 2024    Time in:?3:30   Time out:?4:30   Participants: 9     Data: 1?hour group therapy session (Check ins, and individual check ins)      Clinical Maneuvering/Interventions:?Therapist utilized a person-centered, Motivation interviewing, and CBT approaches to build rapport, exploring and resolving ambivalence associated with commitment to change behaviors related to substance use with and addiction, group member.?Therapist implemented motivational interviewing techniques to assist client with, facilitate identification of maladaptive patterns of thinking and behavior that contribute to client's risk for continued substance use and relapse.?Therapist employed group interaction activities to build rapport among group members, promote sobriety, and emphasize relapse prevention.?Therapist promoted a safe nonjudgmental environment by providing group members with unconditional positive regard and encouraging group members to comply with group rules and guidelines. Therapist assisted group members with identifying and implementing healthier coping strategies.       Response:?Patient attended class in person. Patient participated in completion of check in form. Patient on check in form answered no to question \"currently or since last group meeting,?have you had any suicidal thoughts or plan or intent to hurt yourself”, and patient also answered no to question of \"currently or since your last group meeting, have you had any homicidal thoughts or plan or intent to hurt others\".  Patient expressed some stress that she has been going through and coping skills such as taking breaks and deep breathing to help her work through those.  Patient reported that she is feeling more confident in her sobriety during phase 3 of IOP.  Patient reported that she plans to start attending self-help group meetings to help her build support for when she completes " the program.    On a 1:10 Scale (0-none, 10-high):    Anxiety: 1  Depression: 1  Cravings: 0    Assessment     Diagnoses and all orders for this visit:    1. Alcohol use disorder, severe, in early remission (Primary)    2. Cannabis use without complication             Progress toward goal: At goal    Functional Status: Moderate impairment     Prognosis: Good with Ongoing Treatment     Mental Status Exam:   Hygiene:   good  Cooperation:  Cooperative  Eye Contact:  Good  Psychomotor Behavior:  Appropriate  Affect:  Appropriate  Mood: normal  Speech:  Normal  Thought Process:  Linear  Thought Content:  Mood congruent  Suicidal:  None  Homicidal:  None  Hallucinations:  None  Delusion:  None  Memory:  Intact  Orientation:  Person, Place, Time, and Situation  Reliability:  fair  Insight:  Fair  Judgement:  Fair  Impulse Control:  Fair  Physical/Medical Issues:  No      Assessment:  Engaged in activity/Process and self-disclosed: Yes  Affect:Appropriate   Applies topic to self: Yes  Able to give and receive feedback: Yes    Urinalysis: Negative on Prelim result dated 8/8/2024  Labs:   Last Urine Toxicity  More data exists         Latest Ref Rng & Units 8/8/2024 8/1/2024   LAST URINE TOXICITY RESULTS   Amphetamine, Urine Qual Negative Negative  Negative    Barbiturates Screen, Urine Negative Negative  Negative    Benzodiazepine Screen, Urine Negative Negative  Negative    Buprenorphine, Screen, Urine Negative Negative  Negative    Cocaine Screen, Urine Negative Negative  Negative    Methadone Screen , Urine Negative Negative  Negative    Methamphetamine, Ur Negative Negative  Negative       Details                    Self-Reported days since last use: 195 days for marijuana and 256 days from alcohol    12-Step attendance: 0 meetings    Plan:   Patient will continue in weekly group psychotherapy sessions and individual outpatient psychotherapy sessions every 3-4 weeks and will continue in self-help meetings and seek  additional treatment if necessary following completion.    Patient will continue in IOP Phase two and three group.      Safety plan has previously been discussed with patient.     BARRY Rice  [unfilled]  09:14 EDT

## 2024-08-12 ENCOUNTER — LAB (OUTPATIENT)
Dept: LAB | Facility: HOSPITAL | Age: 29
End: 2024-08-12
Payer: COMMERCIAL

## 2024-08-12 DIAGNOSIS — F10.20 ALCOHOL USE DISORDER, SEVERE, DEPENDENCE: ICD-10-CM

## 2024-08-12 PROCEDURE — 80306 DRUG TEST PRSMV INSTRMNT: CPT

## 2024-08-15 ENCOUNTER — LAB (OUTPATIENT)
Dept: LAB | Facility: HOSPITAL | Age: 29
End: 2024-08-15
Payer: COMMERCIAL

## 2024-08-15 ENCOUNTER — OFFICE VISIT (OUTPATIENT)
Dept: PSYCHIATRY | Facility: CLINIC | Age: 29
End: 2024-08-15
Payer: COMMERCIAL

## 2024-08-15 DIAGNOSIS — F10.20 ALCOHOL USE DISORDER, SEVERE, DEPENDENCE: ICD-10-CM

## 2024-08-15 DIAGNOSIS — F10.21 ALCOHOL USE DISORDER, SEVERE, IN EARLY REMISSION: Primary | ICD-10-CM

## 2024-08-15 DIAGNOSIS — F12.90 CANNABIS USE WITHOUT COMPLICATION: ICD-10-CM

## 2024-08-15 PROCEDURE — 80306 DRUG TEST PRSMV INSTRMNT: CPT

## 2024-08-15 PROCEDURE — 1160F RVW MEDS BY RX/DR IN RCRD: CPT | Performed by: COUNSELOR

## 2024-08-15 PROCEDURE — 90853 GROUP PSYCHOTHERAPY: CPT | Performed by: COUNSELOR

## 2024-08-15 PROCEDURE — 1159F MED LIST DOCD IN RCRD: CPT | Performed by: COUNSELOR

## 2024-08-15 NOTE — PROGRESS NOTES
"[facility] PHASE II / Phase III GROUP NOTE    Name: Paula Linares  Date: August 15, 2024    Time in:?3:30   Time out:?4:30   Participants: 10    Data: 1?hour group therapy session (Check ins, what causes addiction activity videos, the science of addiction video, and positive affirmation)       Clinical Maneuvering/Interventions:?Therapist utilized a person-centered, Motivation interviewing, and CBT approaches to build rapport, exploring and resolving ambivalence associated with commitment to change behaviors related to substance use with and addiction, group member.?Therapist implemented motivational interviewing techniques to assist client with, facilitate identification of maladaptive patterns of thinking and behavior that contribute to client's risk for continued substance use and relapse.?Therapist employed group interaction activities to build rapport among group members, promote sobriety, and emphasize relapse prevention.?Therapist promoted a safe nonjudgmental environment by providing group members with unconditional positive regard and encouraging group members to comply with group rules and guidelines. Therapist assisted group members with identifying and implementing healthier coping strategies.       Response:?Patient attended class in person. Patient participated in completion of check in form. Patient on check in form answered no to question \"currently or since last group meeting,?have you had any suicidal thoughts or plan or intent to hurt yourself”, and patient also answered no to question of \"currently or since your last group meeting, have you had any homicidal thoughts or plan or intent to hurt others\".      On a 1:10 Scale (0-none, 10-high):    Anxiety: 1  Depression: 0  Cravings: 0    Assessment     Diagnoses and all orders for this visit:    1. Alcohol use disorder, severe, in early remission (Primary)    2. Cannabis use without complication             Progress toward goal: At " goal    Functional Status: Moderate impairment     Prognosis: Good with Ongoing Treatment     Mental Status Exam:   Hygiene:   good  Cooperation:  Cooperative  Eye Contact:  Good  Psychomotor Behavior:  Appropriate  Affect:  Appropriate  Mood: normal  Speech:  Normal  Thought Process:  Linear  Thought Content:  Mood congruent  Suicidal:  None  Homicidal:  None  Hallucinations:  None  Delusion:  None  Memory:  Intact  Orientation:  Person, Place, Time, and Situation  Reliability:  fair  Insight:  Fair  Judgement:  Fair  Impulse Control:  Fair  Physical/Medical Issues:  No      Assessment:  Engaged in activity/Process and self-disclosed: Yes  Affect:Appropriate   Applies topic to self: Yes  Able to give and receive feedback: Yes    Urinalysis: Negative  on prelim result 08/15/2024  Labs:   Last Urine Toxicity  More data exists         Latest Ref Rng & Units 8/15/2024 8/12/2024   LAST URINE TOXICITY RESULTS   Amphetamine, Urine Qual Negative Negative  Negative    Barbiturates Screen, Urine Negative Negative  Negative    Benzodiazepine Screen, Urine Negative Negative  Negative    Buprenorphine, Screen, Urine Negative Negative  Negative    Cocaine Screen, Urine Negative Negative  Negative    Methadone Screen , Urine Negative Negative  Negative    Methamphetamine, Ur Negative Negative  Negative       Details                    Self-Reported days since last use: patient check-in sheet reports 202 days marijuana and 263 days alcohol    12-Step attendance: patient check-in sheet reports 1 self help group meeting    Plan:   Patient will continue in weekly group psychotherapy sessions and individual outpatient psychotherapy sessions every 3-4 weeks and will continue in self-help meetings and seek additional treatment if necessary following completion.    Patient will continue in IOP Phase two and three group.      Safety plan has previously been discussed with patient.     BARRY Rice  [unfilled]  18:32 EDT

## 2024-08-16 LAB — REF LAB TEST METHOD: NORMAL

## 2024-08-18 DIAGNOSIS — F33.1 MODERATE EPISODE OF RECURRENT MAJOR DEPRESSIVE DISORDER: ICD-10-CM

## 2024-08-19 RX ORDER — BUPROPION HYDROCHLORIDE 100 MG/1
100 TABLET, EXTENDED RELEASE ORAL EVERY MORNING
Qty: 30 TABLET | Refills: 0 | Status: SHIPPED | OUTPATIENT
Start: 2024-08-19

## 2024-08-20 LAB — REF LAB TEST METHOD: NORMAL

## 2024-08-22 ENCOUNTER — OFFICE VISIT (OUTPATIENT)
Dept: PSYCHIATRY | Facility: CLINIC | Age: 29
End: 2024-08-22
Payer: COMMERCIAL

## 2024-08-22 DIAGNOSIS — F10.21 ALCOHOL USE DISORDER, SEVERE, IN EARLY REMISSION: Primary | ICD-10-CM

## 2024-08-22 DIAGNOSIS — F12.90 CANNABIS USE WITHOUT COMPLICATION: ICD-10-CM

## 2024-08-22 PROCEDURE — 1160F RVW MEDS BY RX/DR IN RCRD: CPT | Performed by: COUNSELOR

## 2024-08-22 PROCEDURE — 90853 GROUP PSYCHOTHERAPY: CPT | Performed by: COUNSELOR

## 2024-08-22 PROCEDURE — 1159F MED LIST DOCD IN RCRD: CPT | Performed by: COUNSELOR

## 2024-08-23 ENCOUNTER — LAB (OUTPATIENT)
Dept: LAB | Facility: HOSPITAL | Age: 29
End: 2024-08-23
Payer: COMMERCIAL

## 2024-08-23 DIAGNOSIS — F10.20 ALCOHOL USE DISORDER, SEVERE, DEPENDENCE: ICD-10-CM

## 2024-08-23 PROCEDURE — 80306 DRUG TEST PRSMV INSTRMNT: CPT

## 2024-08-23 NOTE — PROGRESS NOTES
"[facility] PHASE II / Phase III GROUP NOTE    Name: Paula Linares  Date:     Time in:?3:30   Time out:?4:30   Participants: 9     Data: 1?hour group therapy session (Check ins, humility in recovery discussion)       Clinical Maneuvering/Interventions:?Therapist utilized a person-centered, Motivation interviewing, and CBT approaches to build rapport, exploring and resolving ambivalence associated with commitment to change behaviors related to substance use with and addiction, group member.?Therapist implemented motivational interviewing techniques to assist client with, facilitate identification of maladaptive patterns of thinking and behavior that contribute to client's risk for continued substance use and relapse.?Therapist employed group interaction activities to build rapport among group members, promote sobriety, and emphasize relapse prevention.?Therapist promoted a safe nonjudgmental environment by providing group members with unconditional positive regard and encouraging group members to comply with group rules and guidelines. Therapist assisted group members with identifying and implementing healthier coping strategies.       Response:?Patient attended class in person. Patient participated in completion of check in form. Patient on check in form answered no to question \"currently or since last group meeting,?have you had any suicidal thoughts or plan or intent to hurt yourself”, and patient also answered no to question of \"currently or since your last group meeting, have you had any homicidal thoughts or plan or intent to hurt others\".  Patient openly shared about her experience in practicing humility.  Patient was able to identify growth with recovery by feeling more connected of those that she cares about.    On a 1:10 Scale (0-none, 10-high):    Anxiety: 0  Depression: 0  Cravings: 0    Assessment     Diagnoses and all orders for this visit:    1. Alcohol use disorder, severe, in early remission " (Primary)    2. Cannabis use without complication             Progress toward goal: At goal    Functional Status: Moderate impairment     Prognosis: Good with Ongoing Treatment     Mental Status Exam:   Hygiene:   good  Cooperation:  Cooperative  Eye Contact:  Good  Psychomotor Behavior:  Appropriate  Affect:  Appropriate  Mood: normal  Speech:  Normal  Thought Process:  Linear  Thought Content:  Mood congruent  Suicidal:  None  Homicidal:  None  Hallucinations:  None  Delusion:  None  Memory:  Intact  Orientation:  Person, Place, Time, and Situation  Reliability:  fair  Insight:  Fair  Judgement:  Fair  Impulse Control:  Fair  Physical/Medical Issues:  No      Assessment:  Engaged in activity/Process and self-disclosed: Yes  Affect:Appropriate   Applies topic to self: Yes  Able to give and receive feedback: Yes    Urinalysis: Negative  on confirmation result dated 08/15/2024  Labs:   Last Urine Toxicity  More data exists         Latest Ref Rng & Units 8/15/2024 8/12/2024   LAST URINE TOXICITY RESULTS   Amphetamine, Urine Qual Negative Negative  Negative    Barbiturates Screen, Urine Negative Negative  Negative    Benzodiazepine Screen, Urine Negative Negative  Negative    Buprenorphine, Screen, Urine Negative Negative  Negative    Cocaine Screen, Urine Negative Negative  Negative    Methadone Screen , Urine Negative Negative  Negative    Methamphetamine, Ur Negative Negative  Negative       Details                    Self-Reported days since last use: On patient's check and she reports 209 days for marijuana and 270 days from alcohol    12-Step attendance: Patient's check and she reports Zero 12-step meetings    Plan:   Patient will continue in weekly group psychotherapy sessions and individual outpatient psychotherapy sessions every 3-4 weeks and will continue in self-help meetings and seek additional treatment if necessary following completion.    Patient will continue in IOP Phase two and three group.       Safety plan has previously been discussed with patient.     BARRY Rice  [unfilled]  08:20 EDT

## 2024-08-26 ENCOUNTER — LAB (OUTPATIENT)
Dept: LAB | Facility: HOSPITAL | Age: 29
End: 2024-08-26
Payer: COMMERCIAL

## 2024-08-26 DIAGNOSIS — F10.20 ALCOHOL USE DISORDER, SEVERE, DEPENDENCE: ICD-10-CM

## 2024-08-26 PROCEDURE — 80306 DRUG TEST PRSMV INSTRMNT: CPT

## 2024-08-29 LAB — REF LAB TEST METHOD: NORMAL

## 2024-08-30 LAB — REF LAB TEST METHOD: NORMAL

## 2024-09-03 ENCOUNTER — LAB (OUTPATIENT)
Dept: LAB | Facility: HOSPITAL | Age: 29
End: 2024-09-03
Payer: COMMERCIAL

## 2024-09-03 DIAGNOSIS — F10.20 ALCOHOL USE DISORDER, SEVERE, DEPENDENCE: ICD-10-CM

## 2024-09-03 PROCEDURE — 80306 DRUG TEST PRSMV INSTRMNT: CPT

## 2024-09-05 ENCOUNTER — OFFICE VISIT (OUTPATIENT)
Dept: PSYCHIATRY | Facility: CLINIC | Age: 29
End: 2024-09-05
Payer: COMMERCIAL

## 2024-09-05 DIAGNOSIS — F10.21 ALCOHOL USE DISORDER, SEVERE, IN EARLY REMISSION: Primary | ICD-10-CM

## 2024-09-05 DIAGNOSIS — F12.90 CANNABIS USE WITHOUT COMPLICATION: ICD-10-CM

## 2024-09-05 PROCEDURE — 1160F RVW MEDS BY RX/DR IN RCRD: CPT | Performed by: COUNSELOR

## 2024-09-05 PROCEDURE — 90853 GROUP PSYCHOTHERAPY: CPT | Performed by: COUNSELOR

## 2024-09-05 PROCEDURE — 1159F MED LIST DOCD IN RCRD: CPT | Performed by: COUNSELOR

## 2024-09-05 NOTE — PROGRESS NOTES
"[facility] PHASE II / Phase III GROUP NOTE    Name: Paula Linares  Date: September 5, 2024    Time in:?3:30   Time out:?4:30   Participants: 7     Data: 1?hour group therapy session (Check ins, and negative core values activity)       Clinical Maneuvering/Interventions:?Therapist utilized a person-centered, Motivation interviewing, and CBT approaches to build rapport, exploring and resolving ambivalence associated with commitment to change behaviors related to substance use with and addiction, group member.?Therapist implemented motivational interviewing techniques to assist client with, facilitate identification of maladaptive patterns of thinking and behavior that contribute to client's risk for continued substance use and relapse.?Therapist employed group interaction activities to build rapport among group members, promote sobriety, and emphasize relapse prevention.?Therapist promoted a safe nonjudgmental environment by providing group members with unconditional positive regard and encouraging group members to comply with group rules and guidelines. Therapist assisted group members with identifying and implementing healthier coping strategies.       Response:?Patient attended class in person. Patient participated in completion of check in form. Patient on check in form answered no to question \"currently or since last group meeting,?have you had any suicidal thoughts or plan or intent to hurt yourself”, and patient also answered no to question of \"currently or since your last group meeting, have you had any homicidal thoughts or plan or intent to hurt others\".  Patient identified some resources to help her change negative beliefs about herself.    On a 1:10 Scale (0-none, 10-high):    Anxiety: 0  Depression: 0  Cravings: 0    Assessment     Diagnoses and all orders for this visit:    1. Alcohol use disorder, severe, in early remission (Primary)    2. Cannabis use without complication             Progress toward " goal: At goal    Functional Status: Moderate impairment     Prognosis: Good with Ongoing Treatment     Mental Status Exam:   Hygiene:   good  Cooperation:  Cooperative  Eye Contact:  Good  Psychomotor Behavior:  Appropriate  Affect:  Appropriate  Mood: normal  Speech:  Normal  Thought Process:  Linear  Thought Content:  Mood congruent  Suicidal:  None  Homicidal:  None  Hallucinations:  None  Delusion:  None  Memory:  Intact  Orientation:  Person, Place, Time, and Situation  Reliability:  fair  Insight:  fair  Judgement:  fair  Impulse Control:  fair  Physical/Medical Issues:  No      Assessment:  Engaged in activity/Process and self-disclosed: Yes  Affect:Appropriate   Applies topic to self: Yes  Able to give and receive feedback: Yes    Urinalysis: Positive benzodiazepine on Prelone result dated 9/3/2024  Labs:   Last Urine Toxicity  More data exists         Latest Ref Rng & Units 9/3/2024 8/26/2024   LAST URINE TOXICITY RESULTS   Amphetamine, Urine Qual Negative Negative  Negative    Barbiturates Screen, Urine Negative Negative  Negative    Benzodiazepine Screen, Urine Negative Positive  Positive    Buprenorphine, Screen, Urine Negative Negative  Negative    Cocaine Screen, Urine Negative Negative  Negative    Methadone Screen , Urine Negative Negative  Negative    Methamphetamine, Ur Negative Negative  Negative       Details                    Self-Reported days since last use: Patient check-in sheet reports 223 days from marijuana and 284 days from alcohol    12-Step attendance: Patient check-in sheet reports zero 12-step meetings    Plan:   Patient will continue in weekly group psychotherapy sessions and individual outpatient psychotherapy sessions every 3-4 weeks and will continue in self-help meetings and seek additional treatment if necessary following completion.    Patient will continue in IOP Phase two and three group.      Safety plan has previously been discussed with patient.     Aime Irizarry,  BARRY  [unfilled]  18:03 EDT

## 2024-09-09 LAB — REF LAB TEST METHOD: NORMAL

## 2024-09-12 ENCOUNTER — OFFICE VISIT (OUTPATIENT)
Dept: PSYCHIATRY | Facility: CLINIC | Age: 29
End: 2024-09-12
Payer: COMMERCIAL

## 2024-09-12 ENCOUNTER — LAB (OUTPATIENT)
Dept: LAB | Facility: HOSPITAL | Age: 29
End: 2024-09-12
Payer: COMMERCIAL

## 2024-09-12 DIAGNOSIS — F10.21 ALCOHOL USE DISORDER, SEVERE, IN EARLY REMISSION: Primary | ICD-10-CM

## 2024-09-12 DIAGNOSIS — F10.20 ALCOHOL USE DISORDER, SEVERE, DEPENDENCE: ICD-10-CM

## 2024-09-12 PROCEDURE — 80306 DRUG TEST PRSMV INSTRMNT: CPT

## 2024-09-14 DIAGNOSIS — F41.1 GAD (GENERALIZED ANXIETY DISORDER): ICD-10-CM

## 2024-09-14 DIAGNOSIS — F33.1 MODERATE EPISODE OF RECURRENT MAJOR DEPRESSIVE DISORDER: ICD-10-CM

## 2024-09-15 DIAGNOSIS — F33.1 MODERATE EPISODE OF RECURRENT MAJOR DEPRESSIVE DISORDER: ICD-10-CM

## 2024-09-16 RX ORDER — BUPROPION HYDROCHLORIDE 100 MG/1
100 TABLET, EXTENDED RELEASE ORAL EVERY MORNING
Qty: 30 TABLET | Refills: 0 | Status: SHIPPED | OUTPATIENT
Start: 2024-09-16

## 2024-09-16 RX ORDER — BUPROPION HYDROCHLORIDE 100 MG/1
100 TABLET, EXTENDED RELEASE ORAL EVERY MORNING
Qty: 30 TABLET | Refills: 0 | OUTPATIENT
Start: 2024-09-16

## 2024-09-18 LAB — REF LAB TEST METHOD: NORMAL

## 2024-09-19 ENCOUNTER — TELEPHONE (OUTPATIENT)
Dept: PSYCHIATRY | Facility: CLINIC | Age: 29
End: 2024-09-19
Payer: COMMERCIAL

## 2024-09-19 ENCOUNTER — DOCUMENTATION (OUTPATIENT)
Dept: PSYCHIATRY | Facility: CLINIC | Age: 29
End: 2024-09-19
Payer: COMMERCIAL

## 2024-09-21 LAB — REF LAB TEST METHOD: NORMAL
